# Patient Record
Sex: FEMALE | Race: BLACK OR AFRICAN AMERICAN | Employment: OTHER | URBAN - METROPOLITAN AREA
[De-identification: names, ages, dates, MRNs, and addresses within clinical notes are randomized per-mention and may not be internally consistent; named-entity substitution may affect disease eponyms.]

---

## 2019-02-26 LAB
HBA1C MFR BLD HPLC: 7.4 %
LDL-C, EXTERNAL: 203

## 2019-10-14 LAB — MAMMOGRAPHY, EXTERNAL: NORMAL

## 2019-11-26 ENCOUNTER — OP HISTORICAL/CONVERTED ENCOUNTER (OUTPATIENT)
Dept: OTHER | Age: 69
End: 2019-11-26

## 2020-07-17 VITALS
DIASTOLIC BLOOD PRESSURE: 81 MMHG | OXYGEN SATURATION: 97 % | HEIGHT: 62 IN | TEMPERATURE: 99 F | HEART RATE: 78 BPM | RESPIRATION RATE: 18 BRPM | BODY MASS INDEX: 46.14 KG/M2 | WEIGHT: 250.75 LBS | SYSTOLIC BLOOD PRESSURE: 155 MMHG

## 2020-07-17 PROBLEM — M19.90 CHRONIC OSTEOARTHRITIS: Status: ACTIVE | Noted: 2020-07-17

## 2020-07-17 PROBLEM — I10 ESSENTIAL (PRIMARY) HYPERTENSION: Status: ACTIVE | Noted: 2020-07-17

## 2020-07-17 PROBLEM — E78.2 MIXED HYPERLIPIDEMIA: Status: ACTIVE | Noted: 2020-07-17

## 2020-07-17 PROBLEM — R73.09 ELEVATED GLUCOSE: Status: ACTIVE | Noted: 2020-07-17

## 2020-07-17 RX ORDER — METFORMIN HYDROCHLORIDE 500 MG/1
TABLET ORAL
COMMUNITY
Start: 2020-05-16 | End: 2020-09-02

## 2020-07-17 RX ORDER — LOSARTAN POTASSIUM 100 MG/1
TABLET ORAL
COMMUNITY
Start: 2020-04-22 | End: 2020-10-26

## 2020-07-17 RX ORDER — OMEGA-3-ACID ETHYL ESTERS 1 G/1
2 CAPSULE, LIQUID FILLED ORAL 2 TIMES DAILY
COMMUNITY
End: 2020-07-30 | Stop reason: SDUPTHER

## 2020-07-17 RX ORDER — TRIAMTERENE/HYDROCHLOROTHIAZID 37.5-25 MG
TABLET ORAL
COMMUNITY
Start: 2020-04-22 | End: 2020-10-14

## 2020-07-17 RX ORDER — METOPROLOL SUCCINATE 50 MG/1
TABLET, EXTENDED RELEASE ORAL
COMMUNITY
Start: 2020-04-22 | End: 2020-10-14

## 2020-07-17 RX ORDER — ROSUVASTATIN CALCIUM 10 MG/1
TABLET, COATED ORAL
COMMUNITY
Start: 2020-04-22 | End: 2021-03-03

## 2020-07-30 ENCOUNTER — OFFICE VISIT (OUTPATIENT)
Dept: PRIMARY CARE CLINIC | Age: 70
End: 2020-07-30
Payer: MEDICARE

## 2020-07-30 VITALS
TEMPERATURE: 99.1 F | WEIGHT: 240.38 LBS | RESPIRATION RATE: 20 BRPM | SYSTOLIC BLOOD PRESSURE: 147 MMHG | OXYGEN SATURATION: 98 % | DIASTOLIC BLOOD PRESSURE: 89 MMHG | HEIGHT: 62 IN | HEART RATE: 72 BPM | BODY MASS INDEX: 44.23 KG/M2

## 2020-07-30 DIAGNOSIS — E78.2 MIXED HYPERLIPIDEMIA: ICD-10-CM

## 2020-07-30 DIAGNOSIS — M19.90 CHRONIC OSTEOARTHRITIS: ICD-10-CM

## 2020-07-30 DIAGNOSIS — R73.09 ELEVATED GLUCOSE: ICD-10-CM

## 2020-07-30 DIAGNOSIS — Z11.59 NEED FOR HEPATITIS C SCREENING TEST: ICD-10-CM

## 2020-07-30 DIAGNOSIS — E78.5 HYPERLIPIDEMIA, UNSPECIFIED HYPERLIPIDEMIA TYPE: Primary | ICD-10-CM

## 2020-07-30 DIAGNOSIS — I10 ESSENTIAL (PRIMARY) HYPERTENSION: ICD-10-CM

## 2020-07-30 PROCEDURE — 1101F PT FALLS ASSESS-DOCD LE1/YR: CPT | Performed by: FAMILY MEDICINE

## 2020-07-30 PROCEDURE — G8400 PT W/DXA NO RESULTS DOC: HCPCS | Performed by: FAMILY MEDICINE

## 2020-07-30 PROCEDURE — G8427 DOCREV CUR MEDS BY ELIG CLIN: HCPCS | Performed by: FAMILY MEDICINE

## 2020-07-30 PROCEDURE — 99214 OFFICE O/P EST MOD 30 MIN: CPT | Performed by: FAMILY MEDICINE

## 2020-07-30 PROCEDURE — G8536 NO DOC ELDER MAL SCRN: HCPCS | Performed by: FAMILY MEDICINE

## 2020-07-30 PROCEDURE — G8432 DEP SCR NOT DOC, RNG: HCPCS | Performed by: FAMILY MEDICINE

## 2020-07-30 PROCEDURE — G9899 SCRN MAM PERF RSLTS DOC: HCPCS | Performed by: FAMILY MEDICINE

## 2020-07-30 PROCEDURE — G8417 CALC BMI ABV UP PARAM F/U: HCPCS | Performed by: FAMILY MEDICINE

## 2020-07-30 RX ORDER — OMEGA-3-ACID ETHYL ESTERS 1 G/1
2 CAPSULE, LIQUID FILLED ORAL 2 TIMES DAILY
Qty: 360 CAP | Refills: 1 | Status: SHIPPED | OUTPATIENT
Start: 2020-07-30 | End: 2021-08-06

## 2020-07-30 NOTE — PROGRESS NOTES
Carline Kan is a 69 y.o. female who presents today for the following:  Chief Complaint   Patient presents with    Labs    Follow-up    Hypertension    Cholesterol Problem        This patient comes in today for follow up of the following medical conditions: Osteoarthritis, Diabetes Mellitus, Hypertension and Hyperlipidemia They also have some new problems including:     No Known Allergies    Current Outpatient Medications   Medication Sig    omega-3 acid ethyl esters (LOVAZA) 1 gram capsule Take 2 Caps by mouth two (2) times a day.  losartan (COZAAR) 100 mg tablet TAKE 1 TABLET BY MOUTH ONCE DAILY    metFORMIN (GLUCOPHAGE) 500 mg tablet TAKE 1 TABLET BY MOUTH TWICE DAILY WITH MEALS    metoprolol succinate (TOPROL-XL) 50 mg XL tablet TAKE 1 TABLET BY MOUTH ONCE DAILY    rosuvastatin (CRESTOR) 10 mg tablet TAKE 1 TABLET BY MOUTH ONCE DAILY    triamterene-hydroCHLOROthiazide (MAXZIDE) 37.5-25 mg per tablet TAKE 1 TABLET BY MOUTH ONCE DAILY     No current facility-administered medications for this visit. Past Medical History:   Diagnosis Date    Chronic osteoarthritis 7/17/2020    Elevated glucose 7/17/2020    Essential (primary) hypertension 7/17/2020    Mixed hyperlipidemia 7/17/2020       History reviewed. No pertinent surgical history.     Family History   Problem Relation Age of Onset    Heart Disease Mother     Heart Disease Brother        Social History     Socioeconomic History    Marital status: SINGLE     Spouse name: Not on file    Number of children: Not on file    Years of education: Not on file    Highest education level: Not on file   Occupational History    Not on file   Social Needs    Financial resource strain: Not on file    Food insecurity     Worry: Not on file     Inability: Not on file    Transportation needs     Medical: Not on file     Non-medical: Not on file   Tobacco Use    Smoking status: Current Every Day Smoker     Packs/day: 0.25    Smokeless tobacco: Never Used   Substance and Sexual Activity    Alcohol use: Yes     Comment: Occasionally    Drug use: Not on file    Sexual activity: Not on file   Lifestyle    Physical activity     Days per week: Not on file     Minutes per session: Not on file    Stress: Not on file   Relationships    Social connections     Talks on phone: Not on file     Gets together: Not on file     Attends Hindu service: Not on file     Active member of club or organization: Not on file     Attends meetings of clubs or organizations: Not on file     Relationship status: Not on file    Intimate partner violence     Fear of current or ex partner: Not on file     Emotionally abused: Not on file     Physically abused: Not on file     Forced sexual activity: Not on file   Other Topics Concern    Not on file   Social History Narrative    Not on file         Review of Systems   Constitutional: Negative. Respiratory: Negative. Cardiovascular: Negative. Gastrointestinal: Negative. Genitourinary: Negative. Musculoskeletal: Negative. Neurological: Negative. Psychiatric/Behavioral: Negative. All other systems reviewed and are negative. Visit Vitals  /89 (BP 1 Location: Left arm, BP Patient Position: Sitting)   Pulse 72   Temp 99.1 °F (37.3 °C) (Oral)   Resp 20   Ht 5' 2\" (1.575 m)   Wt 240 lb 6 oz (109 kg)   SpO2 98%   BMI 43.97 kg/m²     Physical Exam  Constitutional:       Appearance: Normal appearance. She is obese. Neck:      Musculoskeletal: Normal range of motion and neck supple. Cardiovascular:      Rate and Rhythm: Normal rate and regular rhythm. Pulses: Normal pulses. Heart sounds: Normal heart sounds. Pulmonary:      Effort: Pulmonary effort is normal.      Breath sounds: Normal breath sounds. Abdominal:      General: Bowel sounds are normal.      Palpations: Abdomen is soft. Musculoskeletal: Normal range of motion. Skin:     General: Skin is warm and dry.    Neurological: General: No focal deficit present. Mental Status: She is alert. Psychiatric:         Mood and Affect: Mood normal.              1. Hyperlipidemia, unspecified hyperlipidemia type  Discussed dietary changes and exercise she could do to help lower her numbers. We also discussed other medications besides the current ones she is on and we will see what her labs show. - omega-3 acid ethyl esters (LOVAZA) 1 gram capsule; Take 2 Caps by mouth two (2) times a day. Dispense: 360 Cap; Refill: 1    2. Essential (primary) hypertension  Her blood pressure is controlled  - CBC WITH AUTOMATED DIFF  - METABOLIC PANEL, COMPREHENSIVE  - URINALYSIS W/ RFLX MICROSCOPIC    3. Chronic osteoarthritis  She has chronic arthritis but no specific one joint that bothers her    4. Mixed hyperlipidemia  See above  - LIPID PANEL    5. Elevated glucose  We will check her sugar level to see if she is gone beyond prediabetes  - HEMOGLOBIN A1C WITH EAG    6.  Need for hepatitis C screening test  We will get the CDC recommended hepatitis C screen  - HEPATITIS C AB

## 2020-07-31 LAB
ALBUMIN SERPL-MCNC: 4.4 G/DL (ref 3.7–4.7)
ALBUMIN/GLOB SERPL: 1.2 {RATIO} (ref 1.2–2.2)
ALP SERPL-CCNC: 112 IU/L (ref 39–117)
ALT SERPL-CCNC: 23 IU/L (ref 0–32)
APPEARANCE UR: CLEAR
AST SERPL-CCNC: 16 IU/L (ref 0–40)
BASOPHILS # BLD AUTO: 0.1 X10E3/UL (ref 0–0.2)
BASOPHILS NFR BLD AUTO: 1 %
BILIRUB SERPL-MCNC: 0.3 MG/DL (ref 0–1.2)
BILIRUB UR QL STRIP: NEGATIVE
BUN SERPL-MCNC: 16 MG/DL (ref 8–27)
BUN/CREAT SERPL: 17 (ref 12–28)
CALCIUM SERPL-MCNC: 10.1 MG/DL (ref 8.7–10.3)
CHLORIDE SERPL-SCNC: 100 MMOL/L (ref 96–106)
CHOLEST SERPL-MCNC: 189 MG/DL (ref 100–199)
CO2 SERPL-SCNC: 25 MMOL/L (ref 20–29)
COLOR UR: YELLOW
CREAT SERPL-MCNC: 0.96 MG/DL (ref 0.57–1)
EOSINOPHIL # BLD AUTO: 0.2 X10E3/UL (ref 0–0.4)
EOSINOPHIL NFR BLD AUTO: 3 %
ERYTHROCYTE [DISTWIDTH] IN BLOOD BY AUTOMATED COUNT: 12.6 % (ref 11.7–15.4)
EST. AVERAGE GLUCOSE BLD GHB EST-MCNC: 194 MG/DL
GLOBULIN SER CALC-MCNC: 3.6 G/DL (ref 1.5–4.5)
GLUCOSE SERPL-MCNC: 183 MG/DL (ref 65–99)
GLUCOSE UR QL: NEGATIVE
HBA1C MFR BLD: 8.4 % (ref 4.8–5.6)
HCT VFR BLD AUTO: 42.7 % (ref 34–46.6)
HCV AB S/CO SERPL IA: <0.1 S/CO RATIO (ref 0–0.9)
HDLC SERPL-MCNC: 71 MG/DL
HGB BLD-MCNC: 14.6 G/DL (ref 11.1–15.9)
HGB UR QL STRIP: NEGATIVE
IMM GRANULOCYTES # BLD AUTO: 0 X10E3/UL (ref 0–0.1)
IMM GRANULOCYTES NFR BLD AUTO: 0 %
KETONES UR QL STRIP: NEGATIVE
LDLC SERPL CALC-MCNC: 99 MG/DL (ref 0–99)
LEUKOCYTE ESTERASE UR QL STRIP: NEGATIVE
LYMPHOCYTES # BLD AUTO: 2.4 X10E3/UL (ref 0.7–3.1)
LYMPHOCYTES NFR BLD AUTO: 40 %
MCH RBC QN AUTO: 32.8 PG (ref 26.6–33)
MCHC RBC AUTO-ENTMCNC: 34.2 G/DL (ref 31.5–35.7)
MCV RBC AUTO: 96 FL (ref 79–97)
MICRO URNS: NORMAL
MONOCYTES # BLD AUTO: 0.5 X10E3/UL (ref 0.1–0.9)
MONOCYTES NFR BLD AUTO: 8 %
NEUTROPHILS # BLD AUTO: 2.9 X10E3/UL (ref 1.4–7)
NEUTROPHILS NFR BLD AUTO: 48 %
NITRITE UR QL STRIP: NEGATIVE
PH UR STRIP: 7 [PH] (ref 5–7.5)
PLATELET # BLD AUTO: 180 X10E3/UL (ref 150–450)
POTASSIUM SERPL-SCNC: 4.5 MMOL/L (ref 3.5–5.2)
PROT SERPL-MCNC: 8 G/DL (ref 6–8.5)
PROT UR QL STRIP: NEGATIVE
RBC # BLD AUTO: 4.45 X10E6/UL (ref 3.77–5.28)
SODIUM SERPL-SCNC: 140 MMOL/L (ref 134–144)
SP GR UR: 1.01 (ref 1–1.03)
TRIGL SERPL-MCNC: 93 MG/DL (ref 0–149)
UROBILINOGEN UR STRIP-MCNC: 0.2 MG/DL (ref 0.2–1)
VLDLC SERPL CALC-MCNC: 19 MG/DL (ref 5–40)
WBC # BLD AUTO: 6 X10E3/UL (ref 3.4–10.8)

## 2020-08-07 ENCOUNTER — TELEPHONE (OUTPATIENT)
Dept: PRIMARY CARE CLINIC | Age: 70
End: 2020-08-07

## 2020-08-07 NOTE — PROGRESS NOTES
Inform the patient that her sugar was elevated. Her hemoglobin A1c was 8.4. Recommend she increase the metformin to 1 tablet in the morning and 2 tablets at night. We will adjust the medication when she needs a new refill.   Recommend fasting office visit in 6 months

## 2020-08-11 ENCOUNTER — TELEPHONE (OUTPATIENT)
Dept: PRIMARY CARE CLINIC | Age: 70
End: 2020-08-11

## 2020-08-14 ENCOUNTER — TELEPHONE (OUTPATIENT)
Dept: PRIMARY CARE CLINIC | Age: 70
End: 2020-08-14

## 2020-08-14 NOTE — TELEPHONE ENCOUNTER
----- Message from Piedad Lambert MD sent at 8/9/2020  8:53 PM EDT -----  Please call the patient regarding her abnormal result.   See prior note

## 2020-09-02 RX ORDER — METFORMIN HYDROCHLORIDE 500 MG/1
TABLET ORAL
Qty: 180 TAB | Refills: 0 | Status: SHIPPED | OUTPATIENT
Start: 2020-09-02 | End: 2021-01-22

## 2020-10-14 RX ORDER — METOPROLOL SUCCINATE 50 MG/1
TABLET, EXTENDED RELEASE ORAL
Qty: 90 TAB | Refills: 0 | Status: SHIPPED | OUTPATIENT
Start: 2020-10-14 | End: 2021-01-22

## 2020-10-14 RX ORDER — TRIAMTERENE/HYDROCHLOROTHIAZID 37.5-25 MG
TABLET ORAL
Qty: 90 TAB | Refills: 0 | Status: SHIPPED | OUTPATIENT
Start: 2020-10-14 | End: 2021-01-22

## 2020-10-21 ENCOUNTER — TELEPHONE (OUTPATIENT)
Dept: PRIMARY CARE CLINIC | Age: 70
End: 2020-10-21

## 2020-10-21 DIAGNOSIS — Z12.31 ENCOUNTER FOR SCREENING MAMMOGRAM FOR MALIGNANT NEOPLASM OF BREAST: Primary | ICD-10-CM

## 2020-10-26 RX ORDER — LOSARTAN POTASSIUM 100 MG/1
TABLET ORAL
Qty: 90 TAB | Refills: 0 | Status: SHIPPED | OUTPATIENT
Start: 2020-10-26 | End: 2021-02-18

## 2020-10-30 ENCOUNTER — HOSPITAL ENCOUNTER (OUTPATIENT)
Dept: MAMMOGRAPHY | Age: 70
Discharge: HOME | End: 2020-10-30
Attending: FAMILY MEDICINE
Payer: MEDICARE

## 2020-10-30 DIAGNOSIS — Z12.31 ENCOUNTER FOR SCREENING MAMMOGRAM FOR MALIGNANT NEOPLASM OF BREAST: ICD-10-CM

## 2020-10-30 PROCEDURE — 77067 SCR MAMMO BI INCL CAD: CPT

## 2020-11-02 ENCOUNTER — HOSPITAL ENCOUNTER (EMERGENCY)
Age: 70
Discharge: HOME | End: 2020-11-02
Attending: EMERGENCY MEDICINE
Payer: MEDICARE

## 2020-11-02 VITALS
BODY MASS INDEX: 41.41 KG/M2 | HEIGHT: 62 IN | HEART RATE: 76 BPM | RESPIRATION RATE: 16 BRPM | WEIGHT: 225 LBS | OXYGEN SATURATION: 97 % | TEMPERATURE: 98.4 F | SYSTOLIC BLOOD PRESSURE: 152 MMHG | DIASTOLIC BLOOD PRESSURE: 103 MMHG
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
## 2020-11-02 PROCEDURE — 74011636637 HC RX REV CODE- 636/637: Performed by: EMERGENCY MEDICINE

## 2020-11-02 PROCEDURE — 74011250637 HC RX REV CODE- 250/637: Performed by: EMERGENCY MEDICINE

## 2020-11-02 PROCEDURE — 99284 EMERGENCY DEPT VISIT MOD MDM: CPT

## 2020-11-02 RX ORDER — FAMOTIDINE 20 MG/1
20 TABLET, FILM COATED ORAL
Status: COMPLETED | OUTPATIENT
Start: 2020-11-02 | End: 2020-11-02

## 2020-11-02 RX ORDER — CETIRIZINE HYDROCHLORIDE 10 MG/1
10 CAPSULE, LIQUID FILLED ORAL DAILY
Qty: 7 CAP | Refills: 0 | Status: SHIPPED | OUTPATIENT
Start: 2020-11-02 | End: 2021-05-18 | Stop reason: SDUPTHER

## 2020-11-02 RX ORDER — PREDNISONE 20 MG/1
20 TABLET ORAL
Status: COMPLETED | OUTPATIENT
Start: 2020-11-02 | End: 2020-11-02

## 2020-11-02 RX ORDER — LORATADINE 10 MG/1
10 TABLET ORAL
Status: DISCONTINUED | OUTPATIENT
Start: 2020-11-02 | End: 2020-11-02

## 2020-11-02 RX ORDER — PERMETHRIN 50 MG/G
CREAM TOPICAL
Qty: 60 G | Refills: 0 | Status: SHIPPED | OUTPATIENT
Start: 2020-11-02 | End: 2020-12-02

## 2020-11-02 RX ADMIN — FAMOTIDINE 20 MG: 20 TABLET, FILM COATED ORAL at 01:49

## 2020-11-02 RX ADMIN — PREDNISONE 20 MG: 20 TABLET ORAL at 01:49

## 2020-11-02 NOTE — ED NOTES
Patient given and verbalized understanding of all discharge and follow-up instructions. Patient verbalized understanding of medication administration. Patient departed ED ambulatory by self.

## 2020-11-02 NOTE — ED PROVIDER NOTES
EMERGENCY DEPARTMENT HISTORY AND PHYSICAL EXAM      Date: 11/2/2020  Patient Name: Lindsey Lynn    History of Presenting Illness     Chief Complaint   Patient presents with    Skin Problem       History Provided By: Patient    HPI: Lindsey Lynn, 69 y.o. female with a past medical history significant obesity , HTN presents to the ED with cc of all over itching all over for 2 weeks. She denies any recent change in detergent lotion. Patient denies shortness of breath nausea vomiting swelling of the lips or tongue swelling. No previous history of itching    There are no other complaints, changes, or physical findings at this time. PCP: Ruslan Gilbert MD    No current facility-administered medications on file prior to encounter. Current Outpatient Medications on File Prior to Encounter   Medication Sig Dispense Refill    losartan (COZAAR) 100 mg tablet Take 1 tablet by mouth once daily 90 Tab 0    triamterene-hydroCHLOROthiazide (MAXZIDE) 37.5-25 mg per tablet Take 1 tablet by mouth once daily 90 Tab 0    metoprolol succinate (TOPROL-XL) 50 mg XL tablet Take 1 tablet by mouth once daily 90 Tab 0    metFORMIN (GLUCOPHAGE) 500 mg tablet TAKE 1 TABLET BY MOUTH TWICE DAILY WITH MEALS 180 Tab 0    omega-3 acid ethyl esters (LOVAZA) 1 gram capsule Take 2 Caps by mouth two (2) times a day.  360 Cap 1    rosuvastatin (CRESTOR) 10 mg tablet TAKE 1 TABLET BY MOUTH ONCE DAILY         Past History     Past Medical History:  Past Medical History:   Diagnosis Date    Chronic osteoarthritis 7/17/2020    Elevated glucose 7/17/2020    Essential (primary) hypertension 7/17/2020    Menopause     Mixed hyperlipidemia 7/17/2020       Past Surgical History:  Past Surgical History:   Procedure Laterality Date    HX BREAST BIOPSY Left 11/2019       Family History:  Family History   Problem Relation Age of Onset    Heart Disease Mother     Heart Disease Brother        Social History:  Social History     Tobacco Use    Smoking status: Current Every Day Smoker     Packs/day: 0.25    Smokeless tobacco: Never Used   Substance Use Topics    Alcohol use: Yes     Comment: Occasionally    Drug use: Not on file       Allergies:  No Known Allergies      Review of Systems     Review of Systems   Constitutional: Negative. Eyes: Negative. Respiratory: Negative. Cardiovascular: Negative. Gastrointestinal: Negative. Musculoskeletal: Negative. Skin: Positive for rash. Excoriated area over the their entire body   Neurological: Negative. Psychiatric/Behavioral: Negative. All other systems reviewed and are negative. Physical Exam   Physical Exam  Vitals signs and nursing note reviewed. Constitutional:       Appearance: Normal appearance. She is obese. HENT:      Head: Normocephalic. Nose: Nose normal.   Neck:      Musculoskeletal: Normal range of motion and neck supple. Cardiovascular:      Rate and Rhythm: Normal rate and regular rhythm. Pulses: Normal pulses. Pulmonary:      Effort: Pulmonary effort is normal.      Breath sounds: Normal breath sounds. Abdominal:      General: Bowel sounds are normal.      Palpations: Abdomen is soft. Skin:     General: Skin is warm. Capillary Refill: Capillary refill takes less than 2 seconds. Findings: Lesion and rash present. Comments:  excoriated areas over the entire body   Neurological:      Mental Status: She is alert. Psychiatric:         Mood and Affect: Mood normal.         Lab and Diagnostic Study Results     Labs -   No results found for this or any previous visit (from the past 12 hour(s)). Radiologic Studies -   @lastxrresult@  CT Results  (Last 48 hours)    None        CXR Results  (Last 48 hours)    None            Medical Decision Making   - I am the first provider for this patient.     - I reviewed the vital signs, available nursing notes, past medical history, past surgical history, family history and social history. - Initial assessment performed. The patients presenting problems have been discussed, and they are in agreement with the care plan formulated and outlined with them. I have encouraged them to ask questions as they arise throughout their visit. Vital Signs-Reviewed the patient's vital signs. Patient Vitals for the past 12 hrs:   Pulse Resp BP SpO2   11/02/20 0120    97 %   11/02/20 0119 75 16 (!) 153/119 97 %       Records Reviewed: Nursing Notes and Old Medical Records    The patient presents with Differential  Diagnosis: scabies bedbugs and contact dermatitis    ED Course:          Provider Notes (Medical Decision Making):   MDM  Number of Diagnoses or Management Options  Insect bites and stings, initial encounter:   Diagnosis management comments: 1. Scabies  2 bed bugs  3.contact dermatitis  4. Insect bite    Risk of Complications, Morbidity, and/or Mortality  Presenting problems: low  Management options: low    Patient Progress  Patient progress: stable         Procedures     Medical Decision Makingedical Decision Making  PROCEDURES:Procedures       Disposition   Disposition: Home Analgesics        DISCHARGE PLAN:  1. Aveeno bath Aveeno lotion affected area  Current Discharge Medication List      CONTINUE these medications which have NOT CHANGED    Details   losartan (COZAAR) 100 mg tablet Take 1 tablet by mouth once daily  Qty: 90 Tab, Refills: 0      triamterene-hydroCHLOROthiazide (MAXZIDE) 37.5-25 mg per tablet Take 1 tablet by mouth once daily  Qty: 90 Tab, Refills: 0      metoprolol succinate (TOPROL-XL) 50 mg XL tablet Take 1 tablet by mouth once daily  Qty: 90 Tab, Refills: 0      metFORMIN (GLUCOPHAGE) 500 mg tablet TAKE 1 TABLET BY MOUTH TWICE DAILY WITH MEALS  Qty: 180 Tab, Refills: 0      omega-3 acid ethyl esters (LOVAZA) 1 gram capsule Take 2 Caps by mouth two (2) times a day.   Qty: 360 Cap, Refills: 1    Associated Diagnoses: Hyperlipidemia, unspecified hyperlipidemia type rosuvastatin (CRESTOR) 10 mg tablet TAKE 1 TABLET BY MOUTH ONCE DAILY           2. Follow-up Information    None       3. Return to ED if worse   4. Current Discharge Medication List            Diagnosis     Clinical Impression: No diagnosis found. ICD-10-CM ICD-9-CM    1. Insect bites and stings, initial encounter  W57. Chauncy Claude 919.4      989.5      E906.4      E905.5        Peewee Stuart MD    Please note that this dictation was completed with eigital, the computer voice recognition software. Quite often unanticipated grammatical, syntax, homophones, and other interpretive errors are inadvertently transcribed by the computer software. Please disregard these errors. Please excuse any errors that have escaped final proofreading. Thank you.

## 2020-11-02 NOTE — DISCHARGE INSTRUCTIONS
Patient advised to apply Aveeno lotion and benadryl for itching. Driving or operation of machinery while taking Benadryl.

## 2020-11-02 NOTE — ED TRIAGE NOTES
Pt reports that she began noticing little red marks on her legs and arms and across her back that have been causing her to \"itch everywhere\" about 2 weeks ago. Pt denies any SOB or any other issues.

## 2020-11-09 ENCOUNTER — HOSPITAL ENCOUNTER (INPATIENT)
Age: 70
LOS: 3 days | Discharge: HOME HEALTH | DRG: 885 | End: 2020-11-13
Attending: EMERGENCY MEDICINE | Admitting: STUDENT IN AN ORGANIZED HEALTH CARE EDUCATION/TRAINING PROGRAM
Payer: MEDICARE

## 2020-11-09 DIAGNOSIS — N39.0 URINARY TRACT INFECTION WITHOUT HEMATURIA, SITE UNSPECIFIED: Primary | ICD-10-CM

## 2020-11-09 DIAGNOSIS — I67.89 CEREBRAL MICROVASCULAR DISEASE: ICD-10-CM

## 2020-11-09 DIAGNOSIS — R41.82 ALTERED MENTAL STATUS, UNSPECIFIED ALTERED MENTAL STATUS TYPE: ICD-10-CM

## 2020-11-09 DIAGNOSIS — R44.1 HALLUCINATIONS, VISUAL: ICD-10-CM

## 2020-11-09 DIAGNOSIS — I65.23 BILATERAL CAROTID ARTERY STENOSIS: ICD-10-CM

## 2020-11-09 LAB
COMMENT, HOLDF: NORMAL
GLUCOSE BLD STRIP.AUTO-MCNC: 147 MG/DL (ref 65–100)
SAMPLES BEING HELD,HOLD: NORMAL
SERVICE CMNT-IMP: ABNORMAL

## 2020-11-09 PROCEDURE — 85025 COMPLETE CBC W/AUTO DIFF WBC: CPT

## 2020-11-09 PROCEDURE — 99285 EMERGENCY DEPT VISIT HI MDM: CPT

## 2020-11-09 PROCEDURE — 84484 ASSAY OF TROPONIN QUANT: CPT

## 2020-11-09 PROCEDURE — 80053 COMPREHEN METABOLIC PANEL: CPT

## 2020-11-09 PROCEDURE — 93005 ELECTROCARDIOGRAM TRACING: CPT

## 2020-11-09 PROCEDURE — 36415 COLL VENOUS BLD VENIPUNCTURE: CPT

## 2020-11-09 PROCEDURE — 82962 GLUCOSE BLOOD TEST: CPT

## 2020-11-09 PROCEDURE — 96365 THER/PROPH/DIAG IV INF INIT: CPT

## 2020-11-09 PROCEDURE — 82550 ASSAY OF CK (CPK): CPT

## 2020-11-09 RX ORDER — SODIUM CHLORIDE 0.9 % (FLUSH) 0.9 %
5-40 SYRINGE (ML) INJECTION AS NEEDED
Status: DISCONTINUED | OUTPATIENT
Start: 2020-11-09 | End: 2020-11-13 | Stop reason: HOSPADM

## 2020-11-09 RX ORDER — SODIUM CHLORIDE 0.9 % (FLUSH) 0.9 %
5-40 SYRINGE (ML) INJECTION EVERY 8 HOURS
Status: DISCONTINUED | OUTPATIENT
Start: 2020-11-09 | End: 2020-11-13 | Stop reason: HOSPADM

## 2020-11-10 ENCOUNTER — APPOINTMENT (OUTPATIENT)
Dept: GENERAL RADIOLOGY | Age: 70
DRG: 885 | End: 2020-11-10
Attending: EMERGENCY MEDICINE
Payer: MEDICARE

## 2020-11-10 ENCOUNTER — APPOINTMENT (OUTPATIENT)
Dept: VASCULAR SURGERY | Age: 70
DRG: 885 | End: 2020-11-10
Attending: PSYCHIATRY & NEUROLOGY
Payer: MEDICARE

## 2020-11-10 ENCOUNTER — APPOINTMENT (OUTPATIENT)
Dept: MRI IMAGING | Age: 70
DRG: 885 | End: 2020-11-10
Attending: PSYCHIATRY & NEUROLOGY
Payer: MEDICARE

## 2020-11-10 ENCOUNTER — APPOINTMENT (OUTPATIENT)
Dept: CT IMAGING | Age: 70
DRG: 885 | End: 2020-11-10
Attending: EMERGENCY MEDICINE
Payer: MEDICARE

## 2020-11-10 ENCOUNTER — APPOINTMENT (OUTPATIENT)
Dept: MRI IMAGING | Age: 70
DRG: 885 | End: 2020-11-10
Attending: STUDENT IN AN ORGANIZED HEALTH CARE EDUCATION/TRAINING PROGRAM
Payer: MEDICARE

## 2020-11-10 PROBLEM — I65.23 BILATERAL CAROTID ARTERY STENOSIS: Status: ACTIVE | Noted: 2020-11-10

## 2020-11-10 PROBLEM — G40.209 COMPLEX PARTIAL SEIZURES WITH IMPAIRED CONSCIOUSNESS AT ONSET (HCC): Status: ACTIVE | Noted: 2020-11-10

## 2020-11-10 PROBLEM — I67.89 CEREBRAL MICROVASCULAR DISEASE: Status: ACTIVE | Noted: 2020-11-10

## 2020-11-10 PROBLEM — F29 PSYCHOSIS (HCC): Status: ACTIVE | Noted: 2020-11-10

## 2020-11-10 PROBLEM — R41.82 ACUTE ALTERATION IN MENTAL STATUS: Status: ACTIVE | Noted: 2020-11-10

## 2020-11-10 PROBLEM — R44.3 HALLUCINATIONS: Status: ACTIVE | Noted: 2020-11-10

## 2020-11-10 PROBLEM — R41.0 ACUTE CONFUSION: Status: ACTIVE | Noted: 2020-11-10

## 2020-11-10 LAB
ALBUMIN SERPL-MCNC: 3.6 G/DL (ref 3.5–5)
ALBUMIN/GLOB SERPL: 0.9 {RATIO} (ref 1.1–2.2)
ALP SERPL-CCNC: 78 U/L (ref 45–117)
ALT SERPL-CCNC: 34 U/L (ref 12–78)
AMPHET UR QL SCN: NEGATIVE
ANION GAP SERPL CALC-SCNC: 6 MMOL/L (ref 5–15)
AST SERPL-CCNC: 23 U/L (ref 15–37)
BARBITURATES UR QL SCN: NEGATIVE
BASOPHILS # BLD: 0 K/UL (ref 0–0.1)
BASOPHILS NFR BLD: 1 % (ref 0–1)
BENZODIAZ UR QL: NEGATIVE
BILIRUB SERPL-MCNC: 0.5 MG/DL (ref 0.2–1)
BUN SERPL-MCNC: 25 MG/DL (ref 6–20)
BUN/CREAT SERPL: 20 (ref 12–20)
CALCIUM SERPL-MCNC: 9.8 MG/DL (ref 8.5–10.1)
CANNABINOIDS UR QL SCN: NEGATIVE
CHLORIDE SERPL-SCNC: 106 MMOL/L (ref 97–108)
CK MB CFR SERPL CALC: 1.3 % (ref 0–2.5)
CK MB SERPL-MCNC: 3.8 NG/ML (ref 5–25)
CK SERPL-CCNC: 286 U/L (ref 26–192)
CO2 SERPL-SCNC: 27 MMOL/L (ref 21–32)
COCAINE UR QL SCN: NEGATIVE
CREAT SERPL-MCNC: 1.27 MG/DL (ref 0.55–1.02)
DIFFERENTIAL METHOD BLD: ABNORMAL
DRUG SCRN COMMENT,DRGCM: NORMAL
EOSINOPHIL # BLD: 0.1 K/UL (ref 0–0.4)
EOSINOPHIL NFR BLD: 1 % (ref 0–7)
ERYTHROCYTE [DISTWIDTH] IN BLOOD BY AUTOMATED COUNT: 12.9 % (ref 11.5–14.5)
EST. AVERAGE GLUCOSE BLD GHB EST-MCNC: 166 MG/DL
ETHANOL SERPL-MCNC: <10 MG/DL
GLOBULIN SER CALC-MCNC: 4 G/DL (ref 2–4)
GLUCOSE BLD STRIP.AUTO-MCNC: 169 MG/DL (ref 65–100)
GLUCOSE BLD STRIP.AUTO-MCNC: 243 MG/DL (ref 65–100)
GLUCOSE BLD STRIP.AUTO-MCNC: 258 MG/DL (ref 65–100)
GLUCOSE SERPL-MCNC: 164 MG/DL (ref 65–100)
HBA1C MFR BLD: 7.4 % (ref 4–5.6)
HCT VFR BLD AUTO: 39.1 % (ref 35–47)
HGB BLD-MCNC: 12.9 G/DL (ref 11.5–16)
IMM GRANULOCYTES # BLD AUTO: 0 K/UL (ref 0–0.04)
IMM GRANULOCYTES NFR BLD AUTO: 0 % (ref 0–0.5)
LEFT CCA DIST DIAS: 29.4 CM/S
LEFT CCA DIST SYS: 96.4 CM/S
LEFT CCA PROX DIAS: 23.8 CM/S
LEFT CCA PROX SYS: 108.7 CM/S
LEFT ECA DIAS: 9.89 CM/S
LEFT ECA SYS: 80.3 CM/S
LEFT ICA DIST DIAS: 28.4 CM/S
LEFT ICA DIST SYS: 97.3 CM/S
LEFT ICA MID DIAS: 27.3 CM/S
LEFT ICA MID SYS: 89.5 CM/S
LEFT ICA PROX DIAS: 17.5 CM/S
LEFT ICA PROX SYS: 74.7 CM/S
LEFT ICA/CCA SYS: 1.01
LEFT SUBCLAVIAN DIAS: 0 CM/S
LEFT SUBCLAVIAN SYS: 131.5 CM/S
LEFT VERTEBRAL DIAS: 11.06 CM/S
LEFT VERTEBRAL SYS: 42.8 CM/S
LYMPHOCYTES # BLD: 2.2 K/UL (ref 0.8–3.5)
LYMPHOCYTES NFR BLD: 36 % (ref 12–49)
MCH RBC QN AUTO: 32.4 PG (ref 26–34)
MCHC RBC AUTO-ENTMCNC: 33 G/DL (ref 30–36.5)
MCV RBC AUTO: 98.2 FL (ref 80–99)
METHADONE UR QL: NEGATIVE
MONOCYTES # BLD: 0.7 K/UL (ref 0–1)
MONOCYTES NFR BLD: 11 % (ref 5–13)
NEUTS SEG # BLD: 3.2 K/UL (ref 1.8–8)
NEUTS SEG NFR BLD: 51 % (ref 32–75)
NRBC # BLD: 0 K/UL (ref 0–0.01)
NRBC BLD-RTO: 0 PER 100 WBC
OPIATES UR QL: NEGATIVE
PCP UR QL: NEGATIVE
PLATELET # BLD AUTO: 141 K/UL (ref 150–400)
PMV BLD AUTO: 11.6 FL (ref 8.9–12.9)
POTASSIUM SERPL-SCNC: 3.9 MMOL/L (ref 3.5–5.1)
PROT SERPL-MCNC: 7.6 G/DL (ref 6.4–8.2)
RBC # BLD AUTO: 3.98 M/UL (ref 3.8–5.2)
RIGHT CCA DIST DIAS: 24.4 CM/S
RIGHT CCA DIST SYS: 88.8 CM/S
RIGHT CCA PROX DIAS: 15.2 CM/S
RIGHT CCA PROX SYS: 85.7 CM/S
RIGHT ECA DIAS: 7.67 CM/S
RIGHT ECA SYS: 71.9 CM/S
RIGHT ICA DIST DIAS: 18.3 CM/S
RIGHT ICA DIST SYS: 62.3 CM/S
RIGHT ICA MID DIAS: 19.5 CM/S
RIGHT ICA MID SYS: 64.9 CM/S
RIGHT ICA PROX DIAS: 7.5 CM/S
RIGHT ICA PROX SYS: 50.5 CM/S
RIGHT ICA/CCA SYS: 0.7
RIGHT SUBCLAVIAN DIAS: 0 CM/S
RIGHT SUBCLAVIAN SYS: 58.1 CM/S
RIGHT VERTEBRAL DIAS: 16.41 CM/S
RIGHT VERTEBRAL SYS: 56.1 CM/S
SERVICE CMNT-IMP: ABNORMAL
SODIUM SERPL-SCNC: 139 MMOL/L (ref 136–145)
TROPONIN I SERPL-MCNC: <0.05 NG/ML
WBC # BLD AUTO: 6.3 K/UL (ref 3.6–11)

## 2020-11-10 PROCEDURE — 73110 X-RAY EXAM OF WRIST: CPT

## 2020-11-10 PROCEDURE — 82962 GLUCOSE BLOOD TEST: CPT

## 2020-11-10 PROCEDURE — 65270000015 HC RM PRIVATE ONCOLOGY

## 2020-11-10 PROCEDURE — 74011250637 HC RX REV CODE- 250/637: Performed by: STUDENT IN AN ORGANIZED HEALTH CARE EDUCATION/TRAINING PROGRAM

## 2020-11-10 PROCEDURE — 74011250636 HC RX REV CODE- 250/636: Performed by: STUDENT IN AN ORGANIZED HEALTH CARE EDUCATION/TRAINING PROGRAM

## 2020-11-10 PROCEDURE — 93880 EXTRACRANIAL BILAT STUDY: CPT

## 2020-11-10 PROCEDURE — 80307 DRUG TEST PRSMV CHEM ANLYZR: CPT

## 2020-11-10 PROCEDURE — 83036 HEMOGLOBIN GLYCOSYLATED A1C: CPT

## 2020-11-10 PROCEDURE — 77030038269 HC DRN EXT URIN PURWCK BARD -A

## 2020-11-10 PROCEDURE — 74011250636 HC RX REV CODE- 250/636: Performed by: EMERGENCY MEDICINE

## 2020-11-10 PROCEDURE — 74011250636 HC RX REV CODE- 250/636: Performed by: PSYCHIATRY & NEUROLOGY

## 2020-11-10 PROCEDURE — 71045 X-RAY EXAM CHEST 1 VIEW: CPT

## 2020-11-10 PROCEDURE — 86780 TREPONEMA PALLIDUM: CPT

## 2020-11-10 PROCEDURE — 70544 MR ANGIOGRAPHY HEAD W/O DYE: CPT

## 2020-11-10 PROCEDURE — 72141 MRI NECK SPINE W/O DYE: CPT

## 2020-11-10 PROCEDURE — 96365 THER/PROPH/DIAG IV INF INIT: CPT

## 2020-11-10 PROCEDURE — 74011636637 HC RX REV CODE- 636/637: Performed by: STUDENT IN AN ORGANIZED HEALTH CARE EDUCATION/TRAINING PROGRAM

## 2020-11-10 PROCEDURE — 74011000258 HC RX REV CODE- 258: Performed by: EMERGENCY MEDICINE

## 2020-11-10 PROCEDURE — 95816 EEG AWAKE AND DROWSY: CPT | Performed by: PSYCHIATRY & NEUROLOGY

## 2020-11-10 PROCEDURE — 70551 MRI BRAIN STEM W/O DYE: CPT

## 2020-11-10 PROCEDURE — 99223 1ST HOSP IP/OBS HIGH 75: CPT | Performed by: PSYCHIATRY & NEUROLOGY

## 2020-11-10 PROCEDURE — 81001 URINALYSIS AUTO W/SCOPE: CPT

## 2020-11-10 PROCEDURE — 74011250637 HC RX REV CODE- 250/637: Performed by: HOSPITALIST

## 2020-11-10 PROCEDURE — 73502 X-RAY EXAM HIP UNI 2-3 VIEWS: CPT

## 2020-11-10 PROCEDURE — 93880 EXTRACRANIAL BILAT STUDY: CPT | Performed by: PSYCHIATRY & NEUROLOGY

## 2020-11-10 PROCEDURE — 36415 COLL VENOUS BLD VENIPUNCTURE: CPT

## 2020-11-10 PROCEDURE — 72125 CT NECK SPINE W/O DYE: CPT

## 2020-11-10 PROCEDURE — 77030005110 HC CATH GASTMY BTN BARD -B

## 2020-11-10 PROCEDURE — 70450 CT HEAD/BRAIN W/O DYE: CPT

## 2020-11-10 RX ORDER — INSULIN LISPRO 100 [IU]/ML
INJECTION, SOLUTION INTRAVENOUS; SUBCUTANEOUS
Status: DISCONTINUED | OUTPATIENT
Start: 2020-11-10 | End: 2020-11-13 | Stop reason: HOSPADM

## 2020-11-10 RX ORDER — LABETALOL HYDROCHLORIDE 5 MG/ML
10 INJECTION, SOLUTION INTRAVENOUS
Status: DISCONTINUED | OUTPATIENT
Start: 2020-11-10 | End: 2020-11-13 | Stop reason: HOSPADM

## 2020-11-10 RX ORDER — PERMETHRIN 50 MG/G
CREAM TOPICAL DAILY
Status: DISCONTINUED | OUTPATIENT
Start: 2020-11-11 | End: 2020-11-10

## 2020-11-10 RX ORDER — SODIUM CHLORIDE 0.9 % (FLUSH) 0.9 %
5-40 SYRINGE (ML) INJECTION EVERY 8 HOURS
Status: DISCONTINUED | OUTPATIENT
Start: 2020-11-10 | End: 2020-11-13 | Stop reason: HOSPADM

## 2020-11-10 RX ORDER — ENOXAPARIN SODIUM 100 MG/ML
40 INJECTION SUBCUTANEOUS DAILY
Status: DISCONTINUED | OUTPATIENT
Start: 2020-11-10 | End: 2020-11-10

## 2020-11-10 RX ORDER — PERMETHRIN 50 MG/G
CREAM TOPICAL DAILY
Status: DISCONTINUED | OUTPATIENT
Start: 2020-11-10 | End: 2020-11-10

## 2020-11-10 RX ORDER — MAGNESIUM SULFATE 100 %
4 CRYSTALS MISCELLANEOUS AS NEEDED
Status: DISCONTINUED | OUTPATIENT
Start: 2020-11-10 | End: 2020-11-13 | Stop reason: HOSPADM

## 2020-11-10 RX ORDER — ACETAMINOPHEN 650 MG/1
650 SUPPOSITORY RECTAL
Status: DISCONTINUED | OUTPATIENT
Start: 2020-11-10 | End: 2020-11-13 | Stop reason: HOSPADM

## 2020-11-10 RX ORDER — SODIUM CHLORIDE 0.9 % (FLUSH) 0.9 %
5-40 SYRINGE (ML) INJECTION AS NEEDED
Status: DISCONTINUED | OUTPATIENT
Start: 2020-11-10 | End: 2020-11-13 | Stop reason: HOSPADM

## 2020-11-10 RX ORDER — POLYETHYLENE GLYCOL 3350 17 G/17G
17 POWDER, FOR SOLUTION ORAL DAILY PRN
Status: DISCONTINUED | OUTPATIENT
Start: 2020-11-10 | End: 2020-11-13 | Stop reason: HOSPADM

## 2020-11-10 RX ORDER — TRAMADOL HYDROCHLORIDE 50 MG/1
50 TABLET ORAL
Status: DISCONTINUED | OUTPATIENT
Start: 2020-11-10 | End: 2020-11-13 | Stop reason: HOSPADM

## 2020-11-10 RX ORDER — TRIAMTERENE/HYDROCHLOROTHIAZID 37.5-25 MG
1 TABLET ORAL DAILY
Status: DISCONTINUED | OUTPATIENT
Start: 2020-11-10 | End: 2020-11-13 | Stop reason: HOSPADM

## 2020-11-10 RX ORDER — ROSUVASTATIN CALCIUM 10 MG/1
10 TABLET, COATED ORAL DAILY
Status: DISCONTINUED | OUTPATIENT
Start: 2020-11-10 | End: 2020-11-13 | Stop reason: HOSPADM

## 2020-11-10 RX ORDER — ACETAMINOPHEN 325 MG/1
650 TABLET ORAL
Status: DISCONTINUED | OUTPATIENT
Start: 2020-11-10 | End: 2020-11-13 | Stop reason: HOSPADM

## 2020-11-10 RX ORDER — LOSARTAN POTASSIUM 100 MG/1
100 TABLET ORAL DAILY
Status: DISCONTINUED | OUTPATIENT
Start: 2020-11-10 | End: 2020-11-13 | Stop reason: HOSPADM

## 2020-11-10 RX ORDER — DEXTROSE 50 % IN WATER (D50W) INTRAVENOUS SYRINGE
25-50 AS NEEDED
Status: DISCONTINUED | OUTPATIENT
Start: 2020-11-10 | End: 2020-11-13 | Stop reason: HOSPADM

## 2020-11-10 RX ORDER — ENOXAPARIN SODIUM 100 MG/ML
40 INJECTION SUBCUTANEOUS EVERY 12 HOURS
Status: DISCONTINUED | OUTPATIENT
Start: 2020-11-10 | End: 2020-11-11 | Stop reason: DRUGHIGH

## 2020-11-10 RX ORDER — INSULIN GLARGINE 100 [IU]/ML
0.2 INJECTION, SOLUTION SUBCUTANEOUS DAILY
Status: DISCONTINUED | OUTPATIENT
Start: 2020-11-10 | End: 2020-11-13 | Stop reason: HOSPADM

## 2020-11-10 RX ORDER — METOPROLOL SUCCINATE 50 MG/1
50 TABLET, EXTENDED RELEASE ORAL DAILY
Status: DISCONTINUED | OUTPATIENT
Start: 2020-11-10 | End: 2020-11-13 | Stop reason: HOSPADM

## 2020-11-10 RX ORDER — LORAZEPAM 2 MG/ML
1-2 INJECTION INTRAMUSCULAR
Status: DISCONTINUED | OUTPATIENT
Start: 2020-11-10 | End: 2020-11-12

## 2020-11-10 RX ADMIN — METOPROLOL SUCCINATE 50 MG: 50 TABLET, EXTENDED RELEASE ORAL at 06:02

## 2020-11-10 RX ADMIN — Medication 10 ML: at 17:23

## 2020-11-10 RX ADMIN — ACETAMINOPHEN 650 MG: 325 TABLET ORAL at 06:02

## 2020-11-10 RX ADMIN — INSULIN LISPRO 3 UNITS: 100 INJECTION, SOLUTION INTRAVENOUS; SUBCUTANEOUS at 17:22

## 2020-11-10 RX ADMIN — LOSARTAN POTASSIUM 100 MG: 100 TABLET, FILM COATED ORAL at 06:02

## 2020-11-10 RX ADMIN — LORAZEPAM 1 MG: 2 INJECTION INTRAMUSCULAR; INTRAVENOUS at 18:35

## 2020-11-10 RX ADMIN — ROSUVASTATIN CALCIUM 10 MG: 10 TABLET, COATED ORAL at 10:51

## 2020-11-10 RX ADMIN — Medication 10 ML: at 17:22

## 2020-11-10 RX ADMIN — INSULIN LISPRO 2 UNITS: 100 INJECTION, SOLUTION INTRAVENOUS; SUBCUTANEOUS at 11:49

## 2020-11-10 RX ADMIN — INSULIN GLARGINE 21 UNITS: 100 INJECTION, SOLUTION SUBCUTANEOUS at 11:49

## 2020-11-10 RX ADMIN — TRIAMTERENE AND HYDROCHLOROTHIAZIDE 1 TABLET: 37.5; 25 TABLET ORAL at 10:51

## 2020-11-10 RX ADMIN — Medication 10 ML: at 23:26

## 2020-11-10 RX ADMIN — Medication 10 ML: at 11:52

## 2020-11-10 RX ADMIN — TRAMADOL HYDROCHLORIDE 50 MG: 50 TABLET, COATED ORAL at 23:24

## 2020-11-10 RX ADMIN — Medication 10 ML: at 03:41

## 2020-11-10 RX ADMIN — CEFTRIAXONE 1 G: 1 INJECTION, POWDER, FOR SOLUTION INTRAMUSCULAR; INTRAVENOUS at 03:41

## 2020-11-10 RX ADMIN — ENOXAPARIN SODIUM 40 MG: 40 INJECTION SUBCUTANEOUS at 23:25

## 2020-11-10 RX ADMIN — ENOXAPARIN SODIUM 40 MG: 40 INJECTION SUBCUTANEOUS at 10:54

## 2020-11-10 NOTE — PROGRESS NOTES
DOMINIC Plan:  - Daughter's home vs. group home? - Pt's daughter states that pt cannot return to living alone, daughter is considering looking into possible [facility] for future or home with daughter and private duty caregiver support  - Family to transport at d/c  - OP F/U Appt: PCP  - 2nd IMM letter         Reason for Admission:  Acute confusion                   RUR Score:  12% low risk for readmission             Plan for utilizing home health:  No HH hx        PCP: First and Last name:  Trent Castro MD   Name of Practice: [facility]   Are you a current patient: Yes/No: Yes   Approximate date of last visit: Uncertain   Can you participate in a virtual visit with your PCP: No                  Current Advanced Directive/Advance Care Plan: No ACP documents on file. Not appropriate to address AMD d/t pt's confusion. Transition of Care Plan:  Undetermined at this time - possibly d/c to daughter's home with private duty caregivers vs. [facility]        CM talked with pt's daughter, Luma Reed, via phone d/t pt's confusion. CM introduced self/role, verified demographics, and discussed discharge planning. Pt is a 69 y.o. who presented to [facility] ED with acute confusion. Pt's PMHx is significant for chronic osteoarthritis, diabetes, essential hypertension, mixed hyperlipidemia, etc.    Pt's transition of care plan is undetermined at this time - possibly d/c to daughter's home with private duty caregivers vs. group home. Pt's daughter states that pt was previously living alone, but that her daughter picked her up and brought her to her own home on last Monday d/t pt having hallucinations and being scared. Pt's daughter states that pt has continued to have visual hallucinations up until she was brought to the hospital. Pt's daughter reports \"bizarre behavior\" exhibited from pt in the last 2-3 weeks.  Pt's daughter also shared that pt has been on an online dating website and has connected with an individual that she has been sending large sums of money to recently. Pt is able to ambulate without assistive devices. No DME at home. Pt's daughter states that pt was previously independent at home, and was driving until last Monday. Pt was able to complete her community errands previously. Pharmacy preference is [facility] in [place], [place]. Daughter does not feel that it is appropriate for patient to return to her home living alone and is weighing options. CM discussed private pay caregiver support and private pay longterm options. Daughter to further review these options. While at daughter's home, pt did have family there to assist her as needed during the day, but would benefit from additional support from caregivers per daughter. CM to continue to follow for transition of care planning. Care Management Interventions  PCP Verified by CM:  Yes  Palliative Care Criteria Met (RRAT>21 & CHF Dx)?: No  Mode of Transport at Discharge: (Family)  Transition of Care Consult (CM Consult): Discharge Planning  Discharge Durable Medical Equipment: No(No DME at home)  Physical Therapy Consult: No  Occupational Therapy Consult: No  Speech Therapy Consult: No  Current Support Network: Lives Alone(Pt previously living alone, has been staying with daughter and family since last monday )  Confirm Follow Up Transport: Family  Discharge Location  Discharge Placement: Unable to determine at this time(Daughter's home vs. longterm )      Eduardo Gipson, [address] [facility], [address] [place] [address]

## 2020-11-10 NOTE — ED NOTES
Pt arrives via EMS due to a fall down 6 stairs face first. , VSS on squad. Pt arrives in a c-collar. EMS reports pt has early onset dementia accompanied with hallucinations thus causing this fall. Pt is living with daughter temporarily. Pt A&O x4. Monitor x2, VSS. Pt denies blood thinners. 2330- Pt appears fatigued, easly aroused, but quickly falls asleep. Notified MD. Will monitor. 0020- pt back from CT. RN accompanied pt with monitor for pt safety. Awaiting results. Pt remains in C-collar and lethargic. Labs sent. 0120- pt sleeping in bed. C-collar in place. Will monitor    0240- Pt straight cath for urine samples. Urine sent to lab. 400ml urine produced. Pt tolerated procedure well. Will monitor.

## 2020-11-10 NOTE — ED NOTES
TRANSFER - OUT REPORT:    Verbal report given to [employer] (name) on [place]  being transferred to Rm 1115(unit) for routine progression of care       Report consisted of patients Situation, Background, Assessment and   Recommendations(SBAR). Information from the following report(s) SBAR, ED Summary, Procedure Summary, Intake/Output and MAR was reviewed with the receiving nurse. Lines:   Peripheral IV 11/10/20 Left Hand (Active)   Site Assessment Clean, dry, & intact 11/10/20 0044   Phlebitis Assessment 0 11/10/20 0044   Infiltration Assessment 0 11/10/20 0044   Dressing Status Clean, dry, & intact 11/10/20 0044   Dressing Type Transparent 11/10/20 0044   Hub Color/Line Status Flushed 11/10/20 0044        Opportunity for questions and clarification was provided. Patient transported with:          Receiving nurse notified of need to call ortho consult as that had not yet been performed. Pt was in her duplex study and was called to be transported from Vascular to her inpatient room assignment of 1115. Belongings of shirt, shoes and pants sent up to patient room.

## 2020-11-10 NOTE — CONSULTS
Consult  REFERRED BY:  Laine Arias MD    CHIEF COMPLAINT: Hallucinations      Subjective:     Norbert Cole is a 69 y.o. right-handed -American female seen as a new patient to me, at the request of the hospitalist for evaluation of new problem of about a 5-week history of progressive hallucinations, with the patient seeing people who are not there, and they are talking to her and throwing things at her, and it seems like she knows some of them but does not know the rest of them but cannot tell me who they are. She is actually seen one in the room right now. She does not seem to agitated, and does not seem to have any major alteration in level of consciousness. She denies any focal weakness, sensory loss, cranial nerve problems, fever, headache, meningismus, or any other precipitating cause for this, denies any new medication, toxin exposure, as a cause of this. I did mention that stress commonly can cause these problems and she says she been under a lot of stress, but does not expound on what it might be. She has hypertension and diabetes, and hyperlipidemia, but does not seem to have any other major medical problems. She never had a history of stroke or TIA, and apparently has never had a history of psychiatric problems before. She is on no medication that normally can cause confusion. She did have a fall the day prior to admission apparently and hit her head and right hand, still has a fairly bruised hand is very tender. X-ray showed no fracture.     Past Medical History:   Diagnosis Date    Chronic osteoarthritis 7/17/2020    Diabetes ([address]     Elevated glucose 7/17/2020    Essential (primary) hypertension 7/17/2020    Menopause     Mixed hyperlipidemia 7/17/2020      Past Surgical History:   Procedure Laterality Date    HX BREAST BIOPSY Left 11/2019    HX GYN       Family History   Problem Relation Age of Onset    Heart Disease Mother     Heart Disease Brother       Social History     Tobacco Use    Smoking status: Current Every Day Smoker     Packs/day: 0.25    Smokeless tobacco: Never Used   Substance Use Topics    Alcohol use: Not Currently     Comment: Occasionally         Current Facility-Administered Medications:     sodium chloride (NS) flush 5-40 mL, 5-40 mL, IntraVENous, Q8H, Flor Kate MD, 10 mL at 11/10/20 1152    sodium chloride (NS) flush 5-40 mL, 5-40 mL, IntraVENous, PRN, Flor Kate MD    acetaminophen (TYLENOL) tablet 650 mg, 650 mg, Oral, Q6H PRN, 650 mg at 11/10/20 0602 **OR** acetaminophen (TYLENOL) suppository 650 mg, 650 mg, Rectal, Q6H PRN, Flor Kate MD    polyethylene glycol (MIRALAX) packet 17 g, 17 g, Oral, DAILY PRN, Flor Kate MD    losartan (COZAAR) tablet 100 mg, 100 mg, Oral, DAILY, Flor Kate MD, 100 mg at 11/10/20 0602    metoprolol succinate (TOPROL-XL) XL tablet 50 mg, 50 mg, Oral, DAILY, Flor Kate MD, 50 mg at 11/10/20 0602    rosuvastatin (CRESTOR) tablet 10 mg, 10 mg, Oral, DAILY, Flor Kate MD, 10 mg at 11/10/20 1051    triamterene-hydroCHLOROthiazide (MAXZIDE) 37.5-25 mg per tablet 1 Tab, 1 Tab, Oral, DAILY, Flor Kate MD, 1 Tab at 11/10/20 1051    glucose chewable tablet 16 g, 4 Tab, Oral, PRN, Flor Kate MD    dextrose (D50W) injection syrg 12.5-25 g, 25-50 mL, IntraVENous, PRN, Flor Kate MD    glucagon (GLUCAGEN) injection 1 mg, 1 mg, IntraMUSCular, PRN, Flor Kate MD    insulin glargine (LANTUS) injection 21 Units, 0.2 Units/kg, SubCUTAneous, DAILY, Flor Kate MD, 21 Units at 11/10/20 1149    insulin lispro (HUMALOG) injection, , SubCUTAneous, AC&HS, Flor Kate MD, 2 Units at 11/10/20 1149    enoxaparin (LOVENOX) injection 40 mg, 40 mg, SubCUTAneous, Q12H, Flor Kate MD, 40 mg at 11/10/20 1054    LORazepam (ATIVAN) injection 1-2 mg, 1-2 mg, IntraVENous, Q6H PRN, Derrick Rankin, Fabienne Otero MD    traMADoL Derik Luis Manuel) tablet 50 mg, 50 mg, Oral, Q6H PRN, Alfonzo, Garrett COSTA MD    sodium chloride (NS) flush 5-40 mL, 5-40 mL, IntraVENous, Q8H, Radha Wahl MD, Stopped at 11/10/20 0600    sodium chloride (NS) flush 5-40 mL, 5-40 mL, IntraVENous, PRN, Radha Wahl MD    Current Outpatient Medications:     permethrin (ACTICIN) 5 % topical cream, As directed, Disp: 60 g, Rfl: 0    Cetirizine (ZyrTEC) 10 mg cap, Take 10 mg by mouth daily. , Disp: 7 Cap, Rfl: 0    losartan (COZAAR) 100 mg tablet, Take 1 tablet by mouth once daily, Disp: 90 Tab, Rfl: 0    triamterene-hydroCHLOROthiazide (MAXZIDE) 37.5-25 mg per tablet, Take 1 tablet by mouth once daily, Disp: 90 Tab, Rfl: 0    metoprolol succinate (TOPROL-XL) 50 mg XL tablet, Take 1 tablet by mouth once daily, Disp: 90 Tab, Rfl: 0    metFORMIN (GLUCOPHAGE) 500 mg tablet, TAKE 1 TABLET BY MOUTH TWICE DAILY WITH MEALS, Disp: 180 Tab, Rfl: 0    omega-3 acid ethyl esters (LOVAZA) 1 gram capsule, Take 2 Caps by mouth two (2) times a day., Disp: 360 Cap, Rfl: 1    rosuvastatin (CRESTOR) 10 mg tablet, TAKE 1 TABLET BY MOUTH ONCE DAILY, Disp: , Rfl:         No Known Allergies   MRI Results (most recent):  No results found for this or any previous visit. No results found for this or any previous visit. Review of Systems:  A comprehensive review of systems was negative except for: Constitutional: positive for fatigue and malaise  Musculoskeletal: positive for myalgias, arthralgias, stiff joints and Right hand pain  Neurological: positive for Hallucinations  Behvioral/Psych: positive for Hallucinations, anxiety, behavior problems and depression   Vitals:    11/10/20 0845 11/10/20 0900 11/10/20 0915 11/10/20 0930   BP: (!) 113/57 110/61 128/64 (!) 119/56   Pulse: (!) 58 (!) 58 63 68   Resp: 19 21 18 19   Temp:       SpO2: 96%  94% 97%   Weight:       Height:         Objective:     I      NEUROLOGICAL EXAM:    Appearance:   The patient is well developed, well nourished, provides a fair history and is in no acute distress. Mental Status: Oriented to time, place and person, and the president, cognitive function is relatively normal except for the hallucinations and speech is fluent and no aphasia or dysarthria. Mood and affect appropriate. Cranial Nerves:   Intact visual fields. Fundi are benign, disc are flat, no lesions seen on funduscopy. DAJUAN, EOM's full, no nystagmus, no ptosis. Facial sensation is normal. Corneal reflexes are not tested. Facial movement is symmetric. Hearing is normal bilaterally. Palate is midline with normal sternocleidomastoid and trapezius muscles are normal. Tongue is midline. Neck without meningismus or bruits  Temporal arteries are not tender or enlarged  TMJ areas are not tender on palpation   Motor:  4/5 strength in upper and lower proximal and distal muscles, but she refuses to use the right hand much. . Normal bulk and tone. No fasciculations. Rapid alternating movement is symmetric and slow bilaterally   Reflexes:   Deep tendon reflexes 1+/4 and symmetrical with absent ankle jerks. No babinski or clonus present   Sensory:   Normal to touch, pinprick and vibration and temperature. DSS is intact   Gait:  Not tested   Tremor:   No tremor noted. Cerebellar:  Not tested cerebellar signs present on Romberg and tandem testing and finger-nose-finger exam is unremarkable. Neurovascular:  Normal heart sounds and regular rhythm, peripheral pulses decreased, and no carotid bruits.            Assessment:   [unfilled]  Active Problems:    Acute confusion (11/10/2020)      Hallucinations (11/10/2020)      Acute alteration in mental status (11/10/2020)      Complex partial seizures with impaired consciousness at onset [facility]) (11/10/2020)      Bilateral carotid artery stenosis (11/10/2020)      Cerebral microvascular disease (11/10/2020)      Psychosis ([facility] [address] (11/10/2020)        Plan:     Patient most likely having acute psychotic break, from some ill-defined stress or involutional psychosis, and has no focal neurological symptoms or signs other than hallucinations. She had a normal CT of the head and an MRI scan is pending and an EEG has been done  Patient has some possible cervical stenosis in the cervical spine, an MRI of the spine has been ordered also. We will check a carotid Doppler study if possible tomorrow. We we will review the EEG study and the MRI when it is done. Patient does not appear to have any active focal neurological deficits. We will follow carefully with you, continue excellent medical care as you are and Ativan ordered for the MRI's  Complete metabolic panel was also ordered to rule out other treatable causes of her symptoms.     Signed By: Eunice Carias MD     November 10, 2020       CC: Shanita Alves MD  FAX: [phone]

## 2020-11-10 NOTE — CONSULTS
ORTHOPAEDIC CONSULT NOTE    Subjective:     Date of Consultation:  November 10, 2020      Alonso De La Torre is a 69 y.o. female who is being seen for R wrist pain s/p GLF prior to admission. XR negative for fracture per radiology reading. + pain with ROM of wrist and fingers. Pt is L hand dominant. Patient Active Problem List    Diagnosis Date Noted    Acute confusion 11/10/2020    Hallucinations 11/10/2020    Acute alteration in mental status 11/10/2020    Complex partial seizures with impaired consciousness at onset [facility]) 11/10/2020    Bilateral carotid artery stenosis 11/10/2020    Cerebral microvascular disease 11/10/2020    Psychosis ([address] 11/10/2020    Chronic osteoarthritis 07/17/2020    Essential (primary) hypertension 07/17/2020    Elevated glucose 07/17/2020    Mixed hyperlipidemia 07/17/2020     Family History   Problem Relation Age of Onset    Heart Disease Mother     Heart Disease Brother       Social History     Tobacco Use    Smoking status: Current Every Day Smoker     Packs/day: 0.25    Smokeless tobacco: Never Used   Substance Use Topics    Alcohol use: Not Currently     Comment: Occasionally     Past Medical History:   Diagnosis Date    Chronic osteoarthritis 7/17/2020    Diabetes ([address]     Elevated glucose 7/17/2020    Essential (primary) hypertension 7/17/2020    Menopause     Mixed hyperlipidemia 7/17/2020      Past Surgical History:   Procedure Laterality Date    HX BREAST BIOPSY Left 11/2019    HX GYN        Prior to Admission medications    Medication Sig Start Date End Date Taking? Authorizing Provider   permethrin (ACTICIN) 5 % topical cream As directed 11/2/20 12/2/20  Therman Mohs, MD   Cetirizine (ZyrTEC) 10 mg cap Take 10 mg by mouth daily.  11/2/20   Therman Mohs, MD   losartan (COZAAR) 100 mg tablet Take 1 tablet by mouth once daily 10/26/20   Tianna Jon MD   triamterene-hydroCHLOROthiazide (MAXZIDE) 37.5-25 mg per tablet Take 1 tablet by mouth once daily 10/14/20   Lisbeth Jara MD   metoprolol succinate (TOPROL-XL) 50 mg XL tablet Take 1 tablet by mouth once daily 10/14/20   Lisbeth Jara MD   metFORMIN (GLUCOPHAGE) 500 mg tablet TAKE 1 TABLET BY MOUTH TWICE DAILY WITH MEALS 9/2/20   Lisbeth Jara MD   omega-3 acid ethyl esters (LOVAZA) 1 gram capsule Take 2 Caps by mouth two (2) times a day.  7/30/20   Lisbeth Jara MD   rosuvastatin (CRESTOR) 10 mg tablet TAKE 1 TABLET BY MOUTH ONCE DAILY 4/22/20   Provider, Historical     Current Facility-Administered Medications   Medication Dose Route Frequency    sodium chloride (NS) flush 5-40 mL  5-40 mL IntraVENous Q8H    sodium chloride (NS) flush 5-40 mL  5-40 mL IntraVENous PRN    acetaminophen (TYLENOL) tablet 650 mg  650 mg Oral Q6H PRN    Or    acetaminophen (TYLENOL) suppository 650 mg  650 mg Rectal Q6H PRN    polyethylene glycol (MIRALAX) packet 17 g  17 g Oral DAILY PRN    losartan (COZAAR) tablet 100 mg  100 mg Oral DAILY    metoprolol succinate (TOPROL-XL) XL tablet 50 mg  50 mg Oral DAILY    rosuvastatin (CRESTOR) tablet 10 mg  10 mg Oral DAILY    triamterene-hydroCHLOROthiazide (MAXZIDE) 37.5-25 mg per tablet 1 Tab  1 Tab Oral DAILY    glucose chewable tablet 16 g  4 Tab Oral PRN    dextrose (D50W) injection syrg 12.5-25 g  25-50 mL IntraVENous PRN    glucagon (GLUCAGEN) injection 1 mg  1 mg IntraMUSCular PRN    insulin glargine (LANTUS) injection 21 Units  0.2 Units/kg SubCUTAneous DAILY    insulin lispro (HUMALOG) injection   SubCUTAneous AC&HS    enoxaparin (LOVENOX) injection 40 mg  40 mg SubCUTAneous Q12H    LORazepam (ATIVAN) injection 1-2 mg  1-2 mg IntraVENous Q6H PRN    traMADoL (ULTRAM) tablet 50 mg  50 mg Oral Q6H PRN    sodium chloride (NS) flush 5-40 mL  5-40 mL IntraVENous Q8H    sodium chloride (NS) flush 5-40 mL  5-40 mL IntraVENous PRN      No Known Allergies     Review of Systems:  A comprehensive review of systems was negative except for that written in the HPI. Mental Status: no dementia    Objective:     Patient Vitals for the past 8 hrs:   BP Temp Pulse Resp SpO2   11/10/20 1455 (!) 113/95 98.6 °F (37 °C) 69 18 99 %   11/10/20 1230 (!) 123/59 98.5 °F (36.9 °C) 70  97 %     Temp (24hrs), Av.5 °F (36.9 °C), Min:98.2 °F (36.8 °C), Max:98.8 °F (37.1 °C)      Gen: Well-developed,  in no acute distress   Musc: RUE - + wrist edema, ecchymosis, and TTP of distal radius, ROM limited due pain, NVI    Skin: No skin breakdown noted.  Skin warm, pink, dry  Neuro: Cranial nerves are grossly intact, no focal motor weakness, follows commands appropriately   Psych: Good insight, oriented to person, place and time, alert    Imaging Review:  XR reviewed with Dr. Mik Rhodes and radiology reading     Labs:   Recent Results (from the past 24 hour(s))   EKG, 12 LEAD, INITIAL    Collection Time: 20 11:45 PM   Result Value Ref Range    Ventricular Rate 71 BPM    Atrial Rate 71 BPM    P-R Interval 146 ms    QRS Duration 90 ms    Q-T Interval 388 ms    QTC Calculation (Bezet) 421 ms    Calculated P Axis 61 degrees    Calculated R Axis -17 degrees    Calculated T Axis 6 degrees    Diagnosis       Normal sinus rhythm  Possible Left atrial enlargement  Left ventricular hypertrophy  No previous ECGs available     GLUCOSE, POC    Collection Time: 20 11:48 PM   Result Value Ref Range    Glucose (POC) 147 (H) 65 - 100 mg/dL    Performed by Cathie DOMINGUEZ    CBC WITH AUTOMATED DIFF    Collection Time: 20 11:49 PM   Result Value Ref Range    WBC 6.3 3.6 - 11.0 K/uL    RBC 3.98 3.80 - 5.20 M/uL    HGB 12.9 11.5 - 16.0 g/dL    HCT 39.1 35.0 - 47.0 %    MCV 98.2 80.0 - 99.0 FL    MCH 32.4 26.0 - 34.0 PG    MCHC 33.0 30.0 - 36.5 g/dL    RDW 12.9 11.5 - 14.5 %    PLATELET 282 (L) 505 - 400 K/uL    MPV 11.6 8.9 - 12.9 FL    NRBC 0.0 0  WBC    ABSOLUTE NRBC 0.00 0.00 - 0.01 K/uL    NEUTROPHILS 51 32 - 75 %    LYMPHOCYTES 36 12 - 49 %    MONOCYTES 11 5 - 13 %    EOSINOPHILS 1 0 - 7 %    BASOPHILS 1 0 - 1 %    IMMATURE GRANULOCYTES 0 0.0 - 0.5 %    ABS. NEUTROPHILS 3.2 1.8 - 8.0 K/UL    ABS. LYMPHOCYTES 2.2 0.8 - 3.5 K/UL    ABS. MONOCYTES 0.7 0.0 - 1.0 K/UL    ABS. EOSINOPHILS 0.1 0.0 - 0.4 K/UL    ABS. BASOPHILS 0.0 0.0 - 0.1 K/UL    ABS. IMM. GRANS. 0.0 0.00 - 0.04 K/UL    DF AUTOMATED     METABOLIC PANEL, COMPREHENSIVE    Collection Time: 11/09/20 11:49 PM   Result Value Ref Range    Sodium 139 136 - 145 mmol/L    Potassium 3.9 3.5 - 5.1 mmol/L    Chloride 106 97 - 108 mmol/L    CO2 27 21 - 32 mmol/L    Anion gap 6 5 - 15 mmol/L    Glucose 164 (H) 65 - 100 mg/dL    BUN 25 (H) 6 - 20 MG/DL    Creatinine 1.27 (H) 0.55 - 1.02 MG/DL    BUN/Creatinine ratio 20 12 - 20      GFR est AA 50 (L) >60 ml/min/1.73m2    GFR est non-AA 41 (L) >60 ml/min/1.73m2    Calcium 9.8 8.5 - 10.1 MG/DL    Bilirubin, total 0.5 0.2 - 1.0 MG/DL    ALT (SGPT) 34 12 - 78 U/L    AST (SGOT) 23 15 - 37 U/L    Alk. phosphatase 78 45 - 117 U/L    Protein, total 7.6 6.4 - 8.2 g/dL    Albumin 3.6 3.5 - 5.0 g/dL    Globulin 4.0 2.0 - 4.0 g/dL    A-G Ratio 0.9 (L) 1.1 - 2.2     SAMPLES BEING HELD    Collection Time: 11/09/20 11:49 PM   Result Value Ref Range    SAMPLES BEING HELD BL     COMMENT        Add-on orders for these samples will be processed based on acceptable specimen integrity and analyte stability, which may vary by analyte.    CK W/ CKMB & INDEX    Collection Time: 11/09/20 11:49 PM   Result Value Ref Range    CK - MB 3.8 (H) <3.6 NG/ML    CK-MB Index 1.3 0.0 - 2.5       (H) 26 - 192 U/L   TROPONIN I    Collection Time: 11/09/20 11:49 PM   Result Value Ref Range    Troponin-I, Qt. <0.05 <0.05 ng/mL   ETHYL ALCOHOL    Collection Time: 11/10/20 12:09 AM   Result Value Ref Range    ALCOHOL(ETHYL),SERUM <10 <10 MG/DL   DRUG SCREEN, URINE    Collection Time: 11/10/20  2:39 AM   Result Value Ref Range    AMPHETAMINES Negative NEG      BARBITURATES Negative NEG      BENZODIAZEPINES Negative NEG      COCAINE Negative NEG      METHADONE Negative NEG      OPIATES Negative NEG      PCP(PHENCYCLIDINE) Negative NEG      THC (TH-CANNABINOL) Negative NEG      Drug screen comment (NOTE)    HEMOGLOBIN A1C WITH EAG    Collection Time: 11/10/20  6:07 AM   Result Value Ref Range    Hemoglobin A1c 7.4 (H) 4.0 - 5.6 %    Est. average glucose 166 mg/dL   GLUCOSE, POC    Collection Time: 11/10/20 10:57 AM   Result Value Ref Range    Glucose (POC) 243 (H) 65 - 100 mg/dL    Performed by Sergio DOMINGUEZ    DUPLEX CAROTID BILATERAL    Collection Time: 11/10/20  2:14 PM   Result Value Ref Range    Right subclavian sys 58.1 cm/s    RIGHT SUBCLAVIAN ARTERY D 0.00 cm/s    Right cca dist sys 88.8 cm/s    Right CCA dist dickinson 24.4 cm/s    Right CCA prox sys 85.7 cm/s    Right CCA prox dickinson 15.2 cm/s    Right ICA dist sys 62.3 cm/s    Right ICA dist dickinson 18.3 cm/s    Right ICA mid sys 64.9 cm/s    Right ICA mid dickinson 19.5 cm/s    Right ICA prox sys 50.5 cm/s    Right ICA prox dickinson 7.5 cm/s    Right eca sys 71.9 cm/s    RIGHT EXTERNAL CAROTID ARTERY D 7.67 cm/s    Right vertebral sys 56.1 cm/s    RIGHT VERTEBRAL ARTERY D 16.41 cm/s    Right ICA/CCA sys 0.7     Left subclavian sys 131.5 cm/s    LEFT SUBCLAVIAN ARTERY D 0.00 cm/s    Left CCA dist sys 96.4 cm/s    Left CCA dist dickinson 29.4 cm/s    Left CCA prox sys 108.7 cm/s    Left CCA prox dickinson 23.8 cm/s    Left ICA dist sys 97.3 cm/s    Left ICA dist dickinson 28.4 cm/s    Left ICA mid sys 89.5 cm/s    Left ICA mid dickinson 27.3 cm/s    Left ICA prox sys 74.7 cm/s    Left ICA prox dickinson 17.5 cm/s    Left ECA sys 80.3 cm/s    LEFT EXTERNAL CAROTID ARTERY D 9.89 cm/s    Left vertebral sys 42.8 cm/s    LEFT VERTEBRAL ARTERY D 11.06 cm/s    Left ICA/CCA sys 1.01    GLUCOSE, POC    Collection Time: 11/10/20  4:44 PM   Result Value Ref Range    Glucose (POC) 258 (H) 65 - 100 mg/dL    Performed by [employer] (DMITRY)          Impression:     Patient Active Problem List    Diagnosis Date Noted  Acute confusion 11/10/2020    Hallucinations 11/10/2020    Acute alteration in mental status 11/10/2020    Complex partial seizures with impaired consciousness at onset [facility]) 11/10/2020    Bilateral carotid artery stenosis 11/10/2020    Cerebral microvascular disease 11/10/2020    Psychosis ([facility] 11/10/2020    Chronic osteoarthritis 07/17/2020    Essential (primary) hypertension 07/17/2020    Elevated glucose 07/17/2020    Mixed hyperlipidemia 07/17/2020     Active Problems:    Acute confusion (11/10/2020)      Hallucinations (11/10/2020)      Acute alteration in mental status (11/10/2020)      Complex partial seizures with impaired consciousness at onset [facility]) (11/10/2020)      Bilateral carotid artery stenosis (11/10/2020)      Cerebral microvascular disease (11/10/2020)      Psychosis ([facility] (11/10/2020)        Plan:   -  Pt is stable orthopaedically, Non-Operative management at this time  -  Probable distal radius styloid fracture upon review of images - pain management,ice and elevate of with wrist splint placed with plan for NWB until repeat XR in 2 weeks   -  Follow up with Dr. Cabrera Peers in 2 weeks, please call for appt at discharge. We will sign off at this time, call with any questions or concerns. Dr. Cabrera Peers aware and agrees with plan as above.         Nicole Delacruz, NP  Orthopedic Nurse Practitioner   [place]

## 2020-11-10 NOTE — PROGRESS NOTES
1406: Attempted to [facility] ED for report x2. Please call [phone] when ready for report. 11/10: TRANSFER - IN REPORT:    Verbal report received from [place](name) on Sara Maddox  being received from ED(unit) for routine progression of care      Report consisted of patients Situation, Background, Assessment and   Recommendations(SBAR). Information from the following report(s) SBAR, Kardex, ED Summary, Intake/Output, MAR, Accordion and Recent Results was reviewed with the receiving nurse. Opportunity for questions and clarification was provided. Assessment completed upon patients arrival to unit and care assumed.

## 2020-11-10 NOTE — ED NOTES
Pt returned from MRI, Unable to perform test due to patient agitation. Pt repeated states she will not do the test and pt would not lay still to do the test at this time.

## 2020-11-10 NOTE — PROGRESS NOTES
Hospitalist Progress Note    NAME: Salma Calles   :  1950   MRN:  [id number]     Subjective:   Daily Progress Note: 11/10/2020 12:49 PM      Chief complaint: fall/AMS  Patient seen  In ER admitted early today. C/o R wrist pain and swelling. She refused MRI. Current Facility-Administered Medications   Medication Dose Route Frequency    sodium chloride (NS) flush 5-40 mL  5-40 mL IntraVENous Q8H    sodium chloride (NS) flush 5-40 mL  5-40 mL IntraVENous PRN    acetaminophen (TYLENOL) tablet 650 mg  650 mg Oral Q6H PRN    Or    acetaminophen (TYLENOL) suppository 650 mg  650 mg Rectal Q6H PRN    polyethylene glycol (MIRALAX) packet 17 g  17 g Oral DAILY PRN    losartan (COZAAR) tablet 100 mg  100 mg Oral DAILY    metoprolol succinate (TOPROL-XL) XL tablet 50 mg  50 mg Oral DAILY    rosuvastatin (CRESTOR) tablet 10 mg  10 mg Oral DAILY    triamterene-hydroCHLOROthiazide (MAXZIDE) 37.5-25 mg per tablet 1 Tab  1 Tab Oral DAILY    glucose chewable tablet 16 g  4 Tab Oral PRN    dextrose (D50W) injection syrg 12.5-25 g  25-50 mL IntraVENous PRN    glucagon (GLUCAGEN) injection 1 mg  1 mg IntraMUSCular PRN    insulin glargine (LANTUS) injection 21 Units  0.2 Units/kg SubCUTAneous DAILY    insulin lispro (HUMALOG) injection   SubCUTAneous AC&HS    enoxaparin (LOVENOX) injection 40 mg  40 mg SubCUTAneous Q12H    LORazepam (ATIVAN) injection 1-2 mg  1-2 mg IntraVENous Q6H PRN    sodium chloride (NS) flush 5-40 mL  5-40 mL IntraVENous Q8H    sodium chloride (NS) flush 5-40 mL  5-40 mL IntraVENous PRN     Current Outpatient Medications   Medication Sig    permethrin (ACTICIN) 5 % topical cream As directed    Cetirizine (ZyrTEC) 10 mg cap Take 10 mg by mouth daily.     losartan (COZAAR) 100 mg tablet Take 1 tablet by mouth once daily    triamterene-hydroCHLOROthiazide (MAXZIDE) 37.5-25 mg per tablet Take 1 tablet by mouth once daily    metoprolol succinate (TOPROL-XL) 50 mg XL tablet Take 1 tablet by mouth once daily    metFORMIN (GLUCOPHAGE) 500 mg tablet TAKE 1 TABLET BY MOUTH TWICE DAILY WITH MEALS    omega-3 acid ethyl esters (LOVAZA) 1 gram capsule Take 2 Caps by mouth two (2) times a day.     rosuvastatin (CRESTOR) 10 mg tablet TAKE 1 TABLET BY MOUTH ONCE DAILY          Objective:     Visit Vitals  BP (!) 119/56   Pulse 68   Temp 98.8 °F (37.1 °C)   Resp 19   Ht 5' 2\" (1.575 m)   Wt 105 kg (231 lb 7.7 oz)   SpO2 97%   BMI 42.34 kg/m²      O2 Device: Room air    Temp (24hrs), Av.6 °F (37 °C), Min:98.2 °F (36.8 °C), Max:98.8 °F (37.1 °C)        Physical Exam:    Gen: Well-developed, well-nourished, in no acute distress  HEENT:    moist mucous membranes  Neck: Supple  Resp: No accessory muscle use,   Card: No murmurs, normal S1, S2 without thrills or peripheral edema  Abd:   Soft, non-tender, obese  Musc: R wrist swelling and dec ROM  Skin: No rashes   Neuro:    follows commands appropriately  Psych:  Flat affect        Data Review    Recent Results (from the past 24 hour(s))   EKG, 12 LEAD, INITIAL    Collection Time: 20 11:45 PM   Result Value Ref Range    Ventricular Rate 71 BPM    Atrial Rate 71 BPM    P-R Interval 146 ms    QRS Duration 90 ms    Q-T Interval 388 ms    QTC Calculation (Bezet) 421 ms    Calculated P Axis 61 degrees    Calculated R Axis -17 degrees    Calculated T Axis 6 degrees    Diagnosis       Normal sinus rhythm  Possible Left atrial enlargement  Left ventricular hypertrophy  No previous ECGs available     GLUCOSE, POC    Collection Time: 20 11:48 PM   Result Value Ref Range    Glucose (POC) 147 (H) 65 - 100 mg/dL    Performed by Benny Lebron BSN    CBC WITH AUTOMATED DIFF    Collection Time: 20 11:49 PM   Result Value Ref Range    WBC 6.3 3.6 - 11.0 K/uL    RBC 3.98 3.80 - 5.20 M/uL    HGB 12.9 11.5 - 16.0 g/dL    HCT 39.1 35.0 - 47.0 %    MCV 98.2 80.0 - 99.0 FL    MCH 32.4 26.0 - 34.0 PG    MCHC 33.0 30.0 - 36.5 g/dL    RDW 12.9 11.5 - 14.5 %    PLATELET 375 (L) 854 - 400 K/uL    MPV 11.6 8.9 - 12.9 FL    NRBC 0.0 0  WBC    ABSOLUTE NRBC 0.00 0.00 - 0.01 K/uL    NEUTROPHILS 51 32 - 75 %    LYMPHOCYTES 36 12 - 49 %    MONOCYTES 11 5 - 13 %    EOSINOPHILS 1 0 - 7 %    BASOPHILS 1 0 - 1 %    IMMATURE GRANULOCYTES 0 0.0 - 0.5 %    ABS. NEUTROPHILS 3.2 1.8 - 8.0 K/UL    ABS. LYMPHOCYTES 2.2 0.8 - 3.5 K/UL    ABS. MONOCYTES 0.7 0.0 - 1.0 K/UL    ABS. EOSINOPHILS 0.1 0.0 - 0.4 K/UL    ABS. BASOPHILS 0.0 0.0 - 0.1 K/UL    ABS. IMM. GRANS. 0.0 0.00 - 0.04 K/UL    DF AUTOMATED     METABOLIC PANEL, COMPREHENSIVE    Collection Time: 11/09/20 11:49 PM   Result Value Ref Range    Sodium 139 136 - 145 mmol/L    Potassium 3.9 3.5 - 5.1 mmol/L    Chloride 106 97 - 108 mmol/L    CO2 27 21 - 32 mmol/L    Anion gap 6 5 - 15 mmol/L    Glucose 164 (H) 65 - 100 mg/dL    BUN 25 (H) 6 - 20 MG/DL    Creatinine 1.27 (H) 0.55 - 1.02 MG/DL    BUN/Creatinine ratio 20 12 - 20      GFR est AA 50 (L) >60 ml/min/1.73m2    GFR est non-AA 41 (L) >60 ml/min/1.73m2    Calcium 9.8 8.5 - 10.1 MG/DL    Bilirubin, total 0.5 0.2 - 1.0 MG/DL    ALT (SGPT) 34 12 - 78 U/L    AST (SGOT) 23 15 - 37 U/L    Alk. phosphatase 78 45 - 117 U/L    Protein, total 7.6 6.4 - 8.2 g/dL    Albumin 3.6 3.5 - 5.0 g/dL    Globulin 4.0 2.0 - 4.0 g/dL    A-G Ratio 0.9 (L) 1.1 - 2.2     SAMPLES BEING HELD    Collection Time: 11/09/20 11:49 PM   Result Value Ref Range    SAMPLES BEING HELD BL     COMMENT        Add-on orders for these samples will be processed based on acceptable specimen integrity and analyte stability, which may vary by analyte.    CK W/ CKMB & INDEX    Collection Time: 11/09/20 11:49 PM   Result Value Ref Range    CK - MB 3.8 (H) <3.6 NG/ML    CK-MB Index 1.3 0.0 - 2.5       (H) 26 - 192 U/L   TROPONIN I    Collection Time: 11/09/20 11:49 PM   Result Value Ref Range    Troponin-I, Qt. <0.05 <0.05 ng/mL   ETHYL ALCOHOL    Collection Time: 11/10/20 12:09 AM   Result Value Ref Range ALCOHOL(ETHYL),SERUM <10 <10 MG/DL   DRUG SCREEN, URINE    Collection Time: 11/10/20  2:39 AM   Result Value Ref Range    AMPHETAMINES Negative NEG      BARBITURATES Negative NEG      BENZODIAZEPINES Negative NEG      COCAINE Negative NEG      METHADONE Negative NEG      OPIATES Negative NEG      PCP(PHENCYCLIDINE) Negative NEG      THC (TH-CANNABINOL) Negative NEG      Drug screen comment (NOTE)    HEMOGLOBIN A1C WITH EAG    Collection Time: 11/10/20  6:07 AM   Result Value Ref Range    Hemoglobin A1c 7.4 (H) 4.0 - 5.6 %    Est. average glucose 166 mg/dL   GLUCOSE, POC    Collection Time: 11/10/20 10:57 AM   Result Value Ref Range    Glucose (POC) 243 (H) 65 - 100 mg/dL    Performed by Julio WHITAKERN      No results found for this visit on 11/09/20. All Micro Results     None           Radiology reports and films for the last 24 hours have been reviewed. Assessment/Plan:     Acute/subacute delusional episode  f/u  TSH, B12, FTAABS  Brain MRI  She refused  Neurology//Psychiatry to see    R wrist pain swelling  xrays ok, ortho to see     Hypertension     home meds metoprolol, losartan, triamterenehydrochlorothiazide     DM  ty 2  - hold Metformin for now    on basal insulin and sliding scale     Hyperlipidemia  Continue rosuvastatin     Asymptomatic bacteriuria   hold Abx          Code Status: Full code  Surrogate Decision Maker: Kamila Koenig  DVT Prophylaxis: Lovenox  Baseline:  Independent in ADLs    Signed By: Kayode Portillo MD     November 10, 2020 100

## 2020-11-10 NOTE — H&P
.                  Hospitalist Admission Note    NAME: Bambi Schmidt   :  1950   MRN:  [id number]     Date/Time:  11/10/2020 5:06 AM    Patient PCP: Tyrone Santamaria MD  ______________________________________________________________________  Given the patient's current clinical presentation, I have a high level of concern for decompensation if discharged from the emergency department. Complex decision making was performed, which includes reviewing the patient's available past medical records, laboratory results, and x-ray films. My assessment of this patient's clinical condition and my plan of care is as follows. Assessment / Plan:  Acute/subacute delusional episode  Concerning for progressive neurocognitive pathology  Will order metabolic/infective work-up including TSH, B12, FTAABS  Brain MRI  Neurology consultation  Psychiatry consultation    Hypertension  We will continue regular home treatments (metoprolol, losartan, triamterenehydrochlorothiazide)    Type 2 diabetes  We will hold off Metformin for now  We will start on basal insulin and sliding scale    Hyperlipidemia  Continue rosuvastatin    Asymptomatic bacteriuria  We will hold off anti-infective treatment as evidence-base does not support treatment of asymptomatic bacteriuria even in the setting of delirium. Code Status: Full code  Surrogate Decision Maker: Rita Rendon    DVT Prophylaxis: Lovenox  GI Prophylaxis: not indicated    Baseline: Independent in ADLs      Subjective:   CHIEF COMPLAINT: Somebody gave me something    HISTORY OF PRESENT ILLNESS:     Yoel Rhodes is a 69 y.o.  female who presents brought by her daughter after she had sustained a mechanical fall at home trying to get out of the bathroom tripped over a few steps. She had fell onto her right side causing her to have some wrist pain bumped her head on the right side. She denied any loss of consciousness. No palpitation.   No chest pain no shortness of breath  States more significantly had started about a month ago and     the patient's own words \"somebody gave me something. I am seeing people, they want to take money from me, but I know they are not there. People are scratching on my stomach. Somebody is practicing [employer] stuff. They are doing it from wherever they are. Somebody I do not want to name is doing it. Looks like he is out there right now. He has given me something. My daughter said she got sick on her stomach. They come during the day, in the evening and during the night. I have not been able to sleep for month. There has been bugs in my food when I was at home. \"    Otherwise she was telling me that she had a fall where she slipped about 11 pm, did not loose consciousness, hit the right side of the head,no nausea. No palpitaion, no shortness of breath. No fever or chills. We were asked to admit for work up and evaluation of the above problems. Past Medical History:   Diagnosis Date    Chronic osteoarthritis 7/17/2020    Diabetes ([address]     Elevated glucose 7/17/2020    Essential (primary) hypertension 7/17/2020    Menopause     Mixed hyperlipidemia 7/17/2020        Past Surgical History:   Procedure Laterality Date    HX BREAST BIOPSY Left 11/2019    HX GYN         Social History     Tobacco Use    Smoking status: Current Every Day Smoker     Packs/day: 0.25    Smokeless tobacco: Never Used   Substance Use Topics    Alcohol use: Not Currently     Comment: Occasionally        Family History   Problem Relation Age of Onset    Heart Disease Mother     Heart Disease Brother      No Known Allergies     Prior to Admission medications    Medication Sig Start Date End Date Taking? Authorizing Provider   permethrin (ACTICIN) 5 % topical cream As directed 11/2/20 12/2/20  Lior Marrufo MD   Cetirizine (ZyrTEC) 10 mg cap Take 10 mg by mouth daily.  11/2/20   Lior Marrufo MD   losartan (COZAAR) 100 mg tablet Take 1 tablet by mouth once daily 10/26/20   Dahlia Clifton MD   triamterene-hydroCHLOROthiazide [facility]) 37.5-25 mg per tablet Take 1 tablet by mouth once daily 10/14/20   Dahlia Clifton MD   metoprolol succinate (TOPROL-XL) 50 mg XL tablet Take 1 tablet by mouth once daily 10/14/20   Dahlia Clifton MD   metFORMIN (GLUCOPHAGE) 500 mg tablet TAKE 1 TABLET BY MOUTH TWICE DAILY WITH MEALS 9/2/20   Dahlia Clifton MD   omega-3 acid ethyl esters (LOVAZA) 1 gram capsule Take 2 Caps by mouth two (2) times a day. 7/30/20   Dahlia Clifton MD   rosuvastatin (CRESTOR) 10 mg tablet TAKE 1 TABLET BY MOUTH ONCE DAILY 4/22/20   Provider, Historical       REVIEW OF SYSTEMS:     I am not able to complete the review of systems because:    The patient is intubated and sedated    The patient has altered mental status due to his acute medical problems    The patient has baseline aphasia from prior stroke(s)    The patient has baseline dementia and is not reliable historian    The patient is in acute medical distress and unable to provide information           Total of 12 systems reviewed as follows:       POSITIVE= underlined text  Negative = text not underlined  General:  fever, chills, sweats, generalized weakness, weight loss/gain,      loss of appetite   Eyes:    blurred vision, eye pain, loss of vision, double vision  ENT:    rhinorrhea, pharyngitis   Respiratory:   cough, sputum production, SOB, HERMAN, wheezing, pleuritic pain   Cardiology:   chest pain, palpitations, orthopnea, PND, edema, syncope   Gastrointestinal:  abdominal pain , N/V, diarrhea, dysphagia, constipation, bleeding   Genitourinary:  frequency, urgency, dysuria, hematuria, incontinence   Muskuloskeletal :  arthralgia, myalgia, back pain  Hematology:  easy bruising, nose or gum bleeding, lymphadenopathy   Dermatological: rash, ulceration, pruritis, color change / jaundice  Endocrine:   hot flashes or polydipsia   Neurological:  headache, dizziness, confusion, focal weakness, paresthesia,     Speech difficulties, memory loss, gait difficulty  Psychological: Feelings of anxiety, depression, agitation    Objective:   VITALS:    Visit Vitals  /63   Pulse 69   Temp 98.8 °F (37.1 °C)   Resp 17   Ht 5' 2\" (1.575 m)   Wt 105 kg (231 lb 7.7 oz)   SpO2 96%   BMI 42.34 kg/m²       PHYSICAL EXAM:    General:    Alert, cooperative, no distress, appears stated age. HEENT: Atraumatic, anicteric sclerae, pink conjunctivae     No oral ulcers, mucosa moist, throat clear, dentition fair  Neck:  Supple, symmetrical,  thyroid: non tender  Lungs:   Clear to auscultation bilaterally. No Wheezing or Rhonchi. No rales. Chest wall:  No tenderness  No Accessory muscle use. Heart:   Regular  rhythm,  No  murmur   No edema  Abdomen:   Soft, non-tender. Not distended. Bowel sounds normal  Extremities: No cyanosis. No clubbing,      Skin turgor normal, Capillary refill normal, Radial dial pulse 2+  Skin:     Not pale. Not Jaundiced  No rashes   Psych:  Good insight. Not depressed. Not anxious or agitated. Neurologic: EOMs intact. No facial asymmetry. No aphasia or slurred speech. Symmetrical strength, Sensation grossly intact.  Alert and oriented X 4.     _______________________________________________________________________  Care Plan discussed with:    Comments   Patient x    Family      RN     Care Manager                    Consultant:      _______________________________________________________________________  Expected  Disposition:   Home with Family x   HH/PT/OT/RN    SNF/LTC    ADDIS    ________________________________________________________________________  TOTAL TIME:  52 Minutes    Critical Care Provided     Minutes non procedure based      Comments    x Reviewed previous records   >50% of visit spent in counseling and coordination of care x Discussion with patient and/or family and questions answered ________________________________________________________________________  Signed: Shelley Killian MD    Procedures: see electronic medical records for all procedures/Xrays and details which were not copied into this note but were reviewed prior to creation of Plan. LAB DATA REVIEWED:    Recent Results (from the past 24 hour(s))   EKG, 12 LEAD, INITIAL    Collection Time: 11/09/20 11:45 PM   Result Value Ref Range    Ventricular Rate 71 BPM    Atrial Rate 71 BPM    P-R Interval 146 ms    QRS Duration 90 ms    Q-T Interval 388 ms    QTC Calculation (Bezet) 421 ms    Calculated P Axis 61 degrees    Calculated R Axis -17 degrees    Calculated T Axis 6 degrees    Diagnosis       Normal sinus rhythm  Possible Left atrial enlargement  Left ventricular hypertrophy  No previous ECGs available     GLUCOSE, POC    Collection Time: 11/09/20 11:48 PM   Result Value Ref Range    Glucose (POC) 147 (H) 65 - 100 mg/dL    Performed by Indy Henriquez BSN    CBC WITH AUTOMATED DIFF    Collection Time: 11/09/20 11:49 PM   Result Value Ref Range    WBC 6.3 3.6 - 11.0 K/uL    RBC 3.98 3.80 - 5.20 M/uL    HGB 12.9 11.5 - 16.0 g/dL    HCT 39.1 35.0 - 47.0 %    MCV 98.2 80.0 - 99.0 FL    MCH 32.4 26.0 - 34.0 PG    MCHC 33.0 30.0 - 36.5 g/dL    RDW 12.9 11.5 - 14.5 %    PLATELET 644 (L) 535 - 400 K/uL    MPV 11.6 8.9 - 12.9 FL    NRBC 0.0 0  WBC    ABSOLUTE NRBC 0.00 0.00 - 0.01 K/uL    NEUTROPHILS 51 32 - 75 %    LYMPHOCYTES 36 12 - 49 %    MONOCYTES 11 5 - 13 %    EOSINOPHILS 1 0 - 7 %    BASOPHILS 1 0 - 1 %    IMMATURE GRANULOCYTES 0 0.0 - 0.5 %    ABS. NEUTROPHILS 3.2 1.8 - 8.0 K/UL    ABS. LYMPHOCYTES 2.2 0.8 - 3.5 K/UL    ABS. MONOCYTES 0.7 0.0 - 1.0 K/UL    ABS. EOSINOPHILS 0.1 0.0 - 0.4 K/UL    ABS. BASOPHILS 0.0 0.0 - 0.1 K/UL    ABS. IMM.  GRANS. 0.0 0.00 - 0.04 K/UL    DF AUTOMATED     METABOLIC PANEL, COMPREHENSIVE    Collection Time: 11/09/20 11:49 PM   Result Value Ref Range    Sodium 139 136 - 145 mmol/L Potassium 3.9 3.5 - 5.1 mmol/L    Chloride 106 97 - 108 mmol/L    CO2 27 21 - 32 mmol/L    Anion gap 6 5 - 15 mmol/L    Glucose 164 (H) 65 - 100 mg/dL    BUN 25 (H) 6 - 20 MG/DL    Creatinine 1.27 (H) 0.55 - 1.02 MG/DL    BUN/Creatinine ratio 20 12 - 20      GFR est AA 50 (L) >60 ml/min/1.73m2    GFR est non-AA 41 (L) >60 ml/min/1.73m2    Calcium 9.8 8.5 - 10.1 MG/DL    Bilirubin, total 0.5 0.2 - 1.0 MG/DL    ALT (SGPT) 34 12 - 78 U/L    AST (SGOT) 23 15 - 37 U/L    Alk. phosphatase 78 45 - 117 U/L    Protein, total 7.6 6.4 - 8.2 g/dL    Albumin 3.6 3.5 - 5.0 g/dL    Globulin 4.0 2.0 - 4.0 g/dL    A-G Ratio 0.9 (L) 1.1 - 2.2     SAMPLES BEING HELD    Collection Time: 11/09/20 11:49 PM   Result Value Ref Range    SAMPLES BEING HELD BL     COMMENT        Add-on orders for these samples will be processed based on acceptable specimen integrity and analyte stability, which may vary by analyte.    CK W/ CKMB & INDEX    Collection Time: 11/09/20 11:49 PM   Result Value Ref Range    CK - MB 3.8 (H) <3.6 NG/ML    CK-MB Index 1.3 0.0 - 2.5       (H) 26 - 192 U/L   TROPONIN I    Collection Time: 11/09/20 11:49 PM   Result Value Ref Range    Troponin-I, Qt. <0.05 <0.05 ng/mL   ETHYL ALCOHOL    Collection Time: 11/10/20 12:09 AM   Result Value Ref Range    ALCOHOL(ETHYL),SERUM <10 <10 MG/DL   DRUG SCREEN, URINE    Collection Time: 11/10/20  2:39 AM   Result Value Ref Range    AMPHETAMINES Negative NEG      BARBITURATES Negative NEG      BENZODIAZEPINES Negative NEG      COCAINE Negative NEG      METHADONE Negative NEG      OPIATES Negative NEG      PCP(PHENCYCLIDINE) Negative NEG      THC (TH-CANNABINOL) Negative NEG      Drug screen comment (NOTE)

## 2020-11-10 NOTE — PROGRESS NOTES

## 2020-11-11 LAB
APPEARANCE UR: ABNORMAL
ATRIAL RATE: 71 BPM
BACTERIA URNS QL MICRO: ABNORMAL /HPF
BILIRUB UR QL: NEGATIVE
CALCULATED P AXIS, ECG09: 61 DEGREES
CALCULATED R AXIS, ECG10: -17 DEGREES
CALCULATED T AXIS, ECG11: 6 DEGREES
COLOR UR: ABNORMAL
DIAGNOSIS, 93000: NORMAL
EPITH CASTS URNS QL MICRO: ABNORMAL /LPF
GLUCOSE BLD STRIP.AUTO-MCNC: 150 MG/DL (ref 65–100)
GLUCOSE BLD STRIP.AUTO-MCNC: 200 MG/DL (ref 65–100)
GLUCOSE BLD STRIP.AUTO-MCNC: 238 MG/DL (ref 65–100)
GLUCOSE BLD STRIP.AUTO-MCNC: 285 MG/DL (ref 65–100)
GLUCOSE UR STRIP.AUTO-MCNC: NEGATIVE MG/DL
HGB UR QL STRIP: NEGATIVE
HYALINE CASTS URNS QL MICRO: ABNORMAL /LPF (ref 0–5)
KETONES UR QL STRIP.AUTO: NEGATIVE MG/DL
LEUKOCYTE ESTERASE UR QL STRIP.AUTO: ABNORMAL
NITRITE UR QL STRIP.AUTO: POSITIVE
P-R INTERVAL, ECG05: 146 MS
PH UR STRIP: 6 [PH] (ref 5–8)
PROT UR STRIP-MCNC: ABNORMAL MG/DL
Q-T INTERVAL, ECG07: 388 MS
QRS DURATION, ECG06: 90 MS
QTC CALCULATION (BEZET), ECG08: 421 MS
RBC #/AREA URNS HPF: ABNORMAL /HPF (ref 0–5)
SERVICE CMNT-IMP: ABNORMAL
SP GR UR REFRACTOMETRY: 1.02 (ref 1–1.03)
TSH SERPL DL<=0.05 MIU/L-ACNC: 1.37 UIU/ML (ref 0.36–3.74)
UA: UC IF INDICATED,UAUC: ABNORMAL
UROBILINOGEN UR QL STRIP.AUTO: 1 EU/DL (ref 0.2–1)
VENTRICULAR RATE, ECG03: 71 BPM
VIT B12 SERPL-MCNC: 416 PG/ML (ref 193–986)
WBC URNS QL MICRO: ABNORMAL /HPF (ref 0–4)

## 2020-11-11 PROCEDURE — 99233 SBSQ HOSP IP/OBS HIGH 50: CPT | Performed by: PSYCHIATRY & NEUROLOGY

## 2020-11-11 PROCEDURE — 65270000015 HC RM PRIVATE ONCOLOGY

## 2020-11-11 PROCEDURE — 36415 COLL VENOUS BLD VENIPUNCTURE: CPT

## 2020-11-11 PROCEDURE — 74011250636 HC RX REV CODE- 250/636: Performed by: STUDENT IN AN ORGANIZED HEALTH CARE EDUCATION/TRAINING PROGRAM

## 2020-11-11 PROCEDURE — 74011250637 HC RX REV CODE- 250/637: Performed by: NURSE PRACTITIONER

## 2020-11-11 PROCEDURE — 82962 GLUCOSE BLOOD TEST: CPT

## 2020-11-11 PROCEDURE — 74011250637 HC RX REV CODE- 250/637: Performed by: STUDENT IN AN ORGANIZED HEALTH CARE EDUCATION/TRAINING PROGRAM

## 2020-11-11 PROCEDURE — 82607 VITAMIN B-12: CPT

## 2020-11-11 PROCEDURE — 84443 ASSAY THYROID STIM HORMONE: CPT

## 2020-11-11 PROCEDURE — 74011636637 HC RX REV CODE- 636/637: Performed by: STUDENT IN AN ORGANIZED HEALTH CARE EDUCATION/TRAINING PROGRAM

## 2020-11-11 PROCEDURE — 74011250637 HC RX REV CODE- 250/637: Performed by: HOSPITALIST

## 2020-11-11 RX ORDER — SERTRALINE HYDROCHLORIDE 50 MG/1
25 TABLET, FILM COATED ORAL DAILY
Status: DISCONTINUED | OUTPATIENT
Start: 2020-11-12 | End: 2020-11-13 | Stop reason: HOSPADM

## 2020-11-11 RX ORDER — ENOXAPARIN SODIUM 100 MG/ML
40 INJECTION SUBCUTANEOUS EVERY 24 HOURS
Status: DISCONTINUED | OUTPATIENT
Start: 2020-11-12 | End: 2020-11-13 | Stop reason: HOSPADM

## 2020-11-11 RX ORDER — RISPERIDONE 0.25 MG/1
0.5 TABLET, FILM COATED ORAL 2 TIMES DAILY
Status: DISCONTINUED | OUTPATIENT
Start: 2020-11-11 | End: 2020-11-13 | Stop reason: HOSPADM

## 2020-11-11 RX ADMIN — ROSUVASTATIN CALCIUM 10 MG: 10 TABLET, COATED ORAL at 09:42

## 2020-11-11 RX ADMIN — RISPERIDONE 0.5 MG: 0.25 TABLET ORAL at 18:11

## 2020-11-11 RX ADMIN — ENOXAPARIN SODIUM 40 MG: 40 INJECTION SUBCUTANEOUS at 09:42

## 2020-11-11 RX ADMIN — INSULIN LISPRO 2 UNITS: 100 INJECTION, SOLUTION INTRAVENOUS; SUBCUTANEOUS at 18:11

## 2020-11-11 RX ADMIN — LOSARTAN POTASSIUM 100 MG: 100 TABLET, FILM COATED ORAL at 09:42

## 2020-11-11 RX ADMIN — Medication 10 ML: at 22:00

## 2020-11-11 RX ADMIN — INSULIN LISPRO 1 UNITS: 100 INJECTION, SOLUTION INTRAVENOUS; SUBCUTANEOUS at 22:09

## 2020-11-11 RX ADMIN — Medication 10 ML: at 22:10

## 2020-11-11 RX ADMIN — INSULIN LISPRO 3 UNITS: 100 INJECTION, SOLUTION INTRAVENOUS; SUBCUTANEOUS at 13:14

## 2020-11-11 RX ADMIN — METOPROLOL SUCCINATE 50 MG: 50 TABLET, EXTENDED RELEASE ORAL at 09:42

## 2020-11-11 RX ADMIN — TRAMADOL HYDROCHLORIDE 50 MG: 50 TABLET, COATED ORAL at 14:56

## 2020-11-11 RX ADMIN — INSULIN GLARGINE 21 UNITS: 100 INJECTION, SOLUTION SUBCUTANEOUS at 09:41

## 2020-11-11 RX ADMIN — Medication 10 ML: at 22:11

## 2020-11-11 NOTE — PROGRESS NOTES
Oncology End of Shift Note      Bedside shift change report given to Cortez John RN (incoming nurse) by Yulisa Cortez (outgoing nurse) on Amy Broderick. Report included the following information SBAR, Kardex, Intake/Output, MAR, Accordion and Recent Results. Shift Summary: received pt from ED, denied pain, patient was complaining \"there is a man in my room and he is doing something to me\"; pt able to answer orientation questions but is reporting the man in her room when there isnt one and complaining of hair in her food so she refused dinner; Ensures ordered d/t daughter stating she has lost 20 lbs due to paranoia causing the patient to not eat; started completing admission database but daughter had to take a phone call and then left - notified night nurse to pass on for tomorrow since she said she would be back; PRN ativan given prior to MRI for claustrophobia; Psych and Ortho consult called      Issues for Physician to Address:       Patient on Cardiac Monitoring?     [] Yes  [] No    Rhythm:      Ricky Scale:     Ricky Score: 18        If Ricky Scale less than 15   Patient Mobility Ordered  No  Speciality Bed Ordered   No     Boots Applied                  No      Shift Events        Yulisa Cortez

## 2020-11-11 NOTE — PROGRESS NOTES
[employer] Monitor initiated on 11/10/2020 at 1900 for the following reason(s): patient safety, fall prevention . Patient educated on use of camera and is in agreement.     If patient unable to verbalize understanding of camera necessity, the responsible party notified and educated:  Daughter verbally agreed while visiting with patient

## 2020-11-11 NOTE — PROGRESS NOTES
Received notification from bedside RN about patient with regards to: recent BP elevation with no PRN meds  VS: /75, HR 73, RR 18, O2 sat 99% on RA, temp 98.5    Intervention given: Labetalol 10 mg prn to keep SBP<165 mmhg

## 2020-11-11 NOTE — PROGRESS NOTES
Problem: Falls - Risk of  Goal: *Absence of Falls  Description: Document Joaquin Click Fall Risk and appropriate interventions in the flowsheet. Outcome: Progressing Towards Goal  Note: Fall Risk Interventions:            Medication Interventions: Patient to call before getting OOB, Teach patient to arise slowly         History of Falls Interventions: Bed/chair exit alarm         Problem: Pressure Injury - Risk of  Goal: *Prevention of pressure injury  Description: Document Ricky Scale and appropriate interventions in the flowsheet.   Outcome: Progressing Towards Goal  Note: Pressure Injury Interventions:       Moisture Interventions: Absorbent underpads, Check for incontinence Q2 hours and as needed, Minimize layers    Activity Interventions: Pressure redistribution bed/mattress(bed type), Increase time out of bed, PT/OT evaluation    Mobility Interventions: HOB 30 degrees or less, Pressure redistribution bed/mattress (bed type)    Nutrition Interventions: Document food/fluid/supplement intake

## 2020-11-11 NOTE — PROGRESS NOTES
Problem: Falls - Risk of  Goal: *Absence of Falls  Description: Document Chantale Sona Fall Risk and appropriate interventions in the flowsheet. Outcome: Progressing Towards Goal  Note: Fall Risk Interventions:            Medication Interventions: Patient to call before getting OOB         History of Falls Interventions: Bed/chair exit alarm, Door open when patient unattended         Problem: Pressure Injury - Risk of  Goal: *Prevention of pressure injury  Description: Document Ricky Scale and appropriate interventions in the flowsheet.   Outcome: Progressing Towards Goal  Note: Pressure Injury Interventions:       Moisture Interventions: Absorbent underpads, Apply protective barrier, creams and emollients    Activity Interventions: Increase time out of bed, Pressure redistribution bed/mattress(bed type)    Mobility Interventions: HOB 30 degrees or less, Float heels    Nutrition Interventions: Document food/fluid/supplement intake

## 2020-11-11 NOTE — PROGRESS NOTES
Comprehensive Nutrition Assessment    Type and Reason for Visit: Initial, Positive nutrition screen    Nutrition Recommendations/Plan:   Continue consistent carb diet  RD will switch ONS from Ensure to Glucerna for better BG control  Please document % meals and supplements consumed in flowsheet I/O's under intake     Nutrition Assessment:      11/11: MST referral received for wt loss and decreased intake. Pt noted for acute confusion, HTN, DM2, and HLD. Consistent carb diet ordered + Ensure enlive TID. Per chart, pt has been experiencing progressive hallucinations x5 weeks. Pt reports a good appetite today, she ate most of breakfast. She admits decreased intake for about a month because she is afraid someone has been putting stuff in her food. She has still been getting at least 1 meal/day throughout this time. Pt reports seeing someone else in the room during our visit (hallucination). RD ensured pt her food would be safe here. Pt was pleasant with RD during visit, and able to answer questions appropriately. No recent BM which pt attributes to little food intake. She usually goes 2x/day. Wt loss noted d/t decreased intake of 6% since July and 10% since Jan. NFPE performed, no significant wasting identified, pt does not feel she has lost strength. Estimated Daily Nutrient Needs:  Energy (kcal): 1474 kcal (BMR x 1. 3AF - 500); Weight Used for Energy Requirements: Current  Protein (g): 50-60g (1.0-1.2g/kg IBW); Weight Used for Protein Requirements: Ideal  Fluid (ml/day): 1500mL; Method Used for Fluid Requirements: 1 ml/kcal      Nutrition Related Findings:  Labs: A1c 7.4 (11/10) -258mg/dL. Meds: lovenox, lantus, humalog, maxzide. Wounds:    None       Current Nutrition Therapies:  DIET DIABETIC CONSISTENT CARB Regular  DIET NUTRITIONAL SUPPLEMENTS All Meals;  Ensure Enlive    Anthropometric Measures:  · Height:  5' 2\" (157.5 cm)  · Current Body Wt:  105 kg (231 lb 7.7 oz)   · BMI Category:  Obese class 3 (BMI 40.0 or greater)       Nutrition Diagnosis:   · Inadequate protein-energy intake related to psychological cause or life stress as evidenced by poor intake prior to admission(6% wt loss since July; 10% wt loss since Jan)      Nutrition Interventions:   Food and/or Nutrient Delivery: Continue current diet, Modify oral nutrition supplement  Nutrition Education and Counseling: No recommendations at this time  Coordination of Nutrition Care: Continue to monitor while inpatient    Goals:  PO intake >50% meals and supplements next 5-7 days       Nutrition Monitoring and Evaluation:   Behavioral-Environmental Outcomes: Beliefs and attitudes  Food/Nutrient Intake Outcomes: Food and nutrient intake, Supplement intake  Physical Signs/Symptoms Outcomes: Biochemical data, Weight, Meal time behavior, GI status    Discharge Planning:    Continue current diet     Electronically signed by Rachele Rhodes RD on 11/11/2020 at 11:10 AM    Contact: [phone]  Pager [phone]

## 2020-11-11 NOTE — PROGRESS NOTES
Oncology End of Shift Note      Bedside shift change report given to CORDELIA Fulton (incoming nurse) by Deshawn Montenegro (outgoing nurse) on [facility]. Report included the following information SBAR, Kardex, Intake/Output, MAR, Accordion and Recent Results. Shift Summary: PT remained stable throughout shift. Scheduled meds given, prn pain meds given. Minimal hallucinations during shift. Pt x1 assist to restroom. No BM. Issues for Physician to Address:       Patient on Cardiac Monitoring?     [] Yes  [] No    Rhythm:      Ricky Scale:     Ricky Score: [address] [facility],[address]

## 2020-11-11 NOTE — PROCEDURES
[facility]  EEG    Name:  Kaitlynn Gomez  MR#:  [id number]  :  1950  ACCOUNT #:  [de-identified]  DATE OF SERVICE:  11/10/2020    CLINICAL INDICATION:  The patient is a 50-year-old female with a history of altered mental status, sudden hallucinations, sudden altered mental status and sudden encephalopathy. EEG to rule out seizures, rule out cortical abnormality, rule out nonconvulsive or partial complex seizures. EEG CLASSIFICATION:  On this patient is normal, awake and drowsy. DESCRIPTION OF THE RECORD:  The background of this recording contains a posteriorly located occipital alpha rhythm of 9-10 Hz that does attenuate some with eye opening. Throughout the recording, there was no clear areas of focal slowing or spike and spike-and-wave discharges seen. The patient did have a slight increase in beta activity at times seen in the central head regions. The patient did have states of sleep with K complexes and sleep spindles seen in the central head regions. Photic stimulation produced a little change in the background recording. Hyperventilation was not performed. INTERPRETATION:  This is a normal electroencephalogram with the patient awake and asleep showing no clear focal abnormalities, no spike discharges, no recorded spells of any type. Clinical correlation recommended. Christo Mcdonald MD      [id number]  D:  11/10/2020 20:48  T:  2020 2:37  JOB #:  [id number]  CC:   Max Cullen MD
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
## 2020-11-11 NOTE — PROGRESS NOTES
Consult  REFERRED BY:  Ruslan Gilbert MD    CHIEF COMPLAINT: Hallucinations      Subjective:     Lindsey Lynn is a 69 y.o. right-handed -American female seen as a new patient to me, at the request of the hospitalist for evaluation of new problem of about a 5-week history of progressive hallucinations, with the patient seeing people who are not there, and they are talking to her and throwing things at her, and it seems like she knows some of them but does not know the rest of them but cannot tell me who they are. She is actually seen one in the room right now. She does not seem to agitated, and does not seem to have any major alteration in level of consciousness. She denies any focal weakness, sensory loss, cranial nerve problems, fever, headache, meningismus, or any other precipitating cause for this, denies any new medication, toxin exposure, as a cause of this. I did mention that stress commonly can cause these problems and she says she been under a lot of stress, but does not expound on what it might be. She has hypertension and diabetes, and hyperlipidemia, but does not seem to have any other major medical problems. She never had a history of stroke or TIA, and apparently has never had a history of psychiatric problems before. She is on no medication that normally can cause confusion. She did have a fall the day prior to admission apparently and hit her head and right hand, still has a fairly bruised hand is very tender. X-ray showed no fracture. Patient is better, and hallucinations are improved not so bad. Her EEG was normal and psychiatry will see her soon.   No change in her neurological exam.    Past Medical History:   Diagnosis Date    Chronic osteoarthritis 7/17/2020    Diabetes ([address]     Elevated glucose 7/17/2020    Essential (primary) hypertension 7/17/2020    Menopause     Mixed hyperlipidemia 7/17/2020      Past Surgical History:   Procedure Laterality Date    HX BREAST BIOPSY Left 11/2019    HX GYN       Family History   Problem Relation Age of Onset    Heart Disease Mother     Heart Disease Brother       Social History     Tobacco Use    Smoking status: Current Every Day Smoker     Packs/day: 0.25    Smokeless tobacco: Never Used   Substance Use Topics    Alcohol use: Not Currently     Comment: Occasionally         Current Facility-Administered Medications:     [START ON 11/12/2020] enoxaparin (LOVENOX) injection 40 mg, 40 mg, SubCUTAneous, Q24H, Nery Clarke F, NP    sodium chloride (NS) flush 5-40 mL, 5-40 mL, IntraVENous, Q8H, Joe Kate MD, 10 mL at 11/10/20 2326    sodium chloride (NS) flush 5-40 mL, 5-40 mL, IntraVENous, PRN, Joe Kate MD    acetaminophen (TYLENOL) tablet 650 mg, 650 mg, Oral, Q6H PRN, 650 mg at 11/10/20 0602 **OR** acetaminophen (TYLENOL) suppository 650 mg, 650 mg, Rectal, Q6H PRN, Joe Kate MD    polyethylene glycol (MIRALAX) packet 17 g, 17 g, Oral, DAILY PRN, Joe Kate MD    losartan (COZAAR) tablet 100 mg, 100 mg, Oral, DAILY, Joe Kate MD, 100 mg at 11/11/20 0942    metoprolol succinate (TOPROL-XL) XL tablet 50 mg, 50 mg, Oral, DAILY, Joe Kate MD, 50 mg at 11/11/20 0761    rosuvastatin (CRESTOR) tablet 10 mg, 10 mg, Oral, DAILY, Joe Kate MD, 10 mg at 11/11/20 0942    triamterene-hydroCHLOROthiazide (MAXZIDE) 37.5-25 mg per tablet 1 Tab, 1 Tab, Oral, DAILY, Joe Kate MD, 1 Tab at 11/10/20 1051    glucose chewable tablet 16 g, 4 Tab, Oral, PRN, Joe Kate MD    dextrose (D50W) injection syrg 12.5-25 g, 25-50 mL, IntraVENous, PRN, Joe Kate MD    glucagon (GLUCAGEN) injection 1 mg, 1 mg, IntraMUSCular, PRN, Joe Kate MD    insulin glargine (LANTUS) injection 21 Units, 0.2 Units/kg, SubCUTAneous, DAILY, Joe Kate MD, 21 Units at 11/11/20 0941    insulin lispro (HUMALOG) injection, , SubCUTAneous, AC&HS, Alcharif, Ladean Boeck, MD, 3 Units at 11/11/20 1314    LORazepam (ATIVAN) injection 1-2 mg, 1-2 mg, IntraVENous, Q6H PRN, Tej Blanca MD, 1 mg at 11/10/20 1835    traMADoL (ULTRAM) tablet 50 mg, 50 mg, Oral, Q6H PRN, Alfonzo, Garrett COSTA MD, 50 mg at 11/10/20 2324    labetaloL (NORMODYNE;TRANDATE) injection 10 mg, 10 mg, IntraVENous, Q4H PRN, Ketty Thomas NP    sodium chloride (NS) flush 5-40 mL, 5-40 mL, IntraVENous, Q8H, Radha Wahl MD, 10 mL at 11/10/20 2326    sodium chloride (NS) flush 5-40 mL, 5-40 mL, IntraVENous, PRN, Radha Wahl MD        No Known Allergies   MRI Results (most recent):  Results from [place] encounter on 11/09/20   MRI CERV SPINE WO CONT    Narrative EXAM: MRI CERV SPINE WO CONT  Clinical history: Cervical myelopathy  INDICATION: myelopathy. COMPARISON: CT C-spine performed without  2020    TECHNIQUE: MR imaging of the cervical spine was performed using the following  sequences: sagittal T1, T2, STIR;  axial T2, T1.     CONTRAST:  None. FINDINGS:    There is normal alignment of the cervical spine. Vertebral body heights are  maintained. Marrow signal is normal.    The craniocervical junction is intact. The course, caliber, and signal intensity  of the spinal cord are normal.      The paraspinal soft tissues are within normal limits. C2-C3: Mild facet arthropathy. Mild right paracentral protrusion. Mild canal  stenosis. Foramina are patent. C3-C4: Mild to moderate facet arthropathy. Disc bulge/osteophyte. Mild canal  stenosis. Foramina are patent. C4-C5: Mild right paracentral protrusion/osteophyte. Mild facet arthropathy. Canal is patent. Moderate left foraminal stenosis. C5-C6: Bulge/osteophyte. Disc desiccation. Mild canal stenosis. Foramina are  patent. C6-C7: Disc desiccation. Disc bulge/osteophyte. Canal is patent. Foramina are  patent     C7-T1: Disc bulge/osteophyte. Canal and foramina are patent.       Impression IMPRESSION:  Multilevel disc and facet degenerative change. Moderate left foraminal stenosis at C4-5. Mild central canal stenosis at C2-3, C3-4 and C5-6. Results from [place] encounter on 11/09/20   MRI CERV SPINE WO CONT    Narrative EXAM: MRI CERV SPINE WO CONT  Clinical history: Cervical myelopathy  INDICATION: myelopathy. COMPARISON: CT C-spine performed without  2020    TECHNIQUE: MR imaging of the cervical spine was performed using the following  sequences: sagittal T1, T2, STIR;  axial T2, T1.     CONTRAST:  None. FINDINGS:    There is normal alignment of the cervical spine. Vertebral body heights are  maintained. Marrow signal is normal.    The craniocervical junction is intact. The course, caliber, and signal intensity  of the spinal cord are normal.      The paraspinal soft tissues are within normal limits. C2-C3: Mild facet arthropathy. Mild right paracentral protrusion. Mild canal  stenosis. Foramina are patent. C3-C4: Mild to moderate facet arthropathy. Disc bulge/osteophyte. Mild canal  stenosis. Foramina are patent. C4-C5: Mild right paracentral protrusion/osteophyte. Mild facet arthropathy. Canal is patent. Moderate left foraminal stenosis. C5-C6: Bulge/osteophyte. Disc desiccation. Mild canal stenosis. Foramina are  patent. C6-C7: Disc desiccation. Disc bulge/osteophyte. Canal is patent. Foramina are  patent     C7-T1: Disc bulge/osteophyte. Canal and foramina are patent. Impression IMPRESSION:  Multilevel disc and facet degenerative change. Moderate left foraminal stenosis at C4-5. Mild central canal stenosis at C2-3, C3-4 and C5-6.      Review of Systems:  A comprehensive review of systems was negative except for: Constitutional: positive for fatigue and malaise  Musculoskeletal: positive for myalgias, arthralgias, stiff joints and Right hand pain  Neurological: positive for Hallucinations  Behvioral/Psych: positive for Hallucinations, anxiety, behavior problems and depression   Vitals:    11/10/20 1455 11/10/20 1945 11/10/20 2300 11/11/20 0803   BP: (!) 113/95 (!) 181/75 (!) 156/74 (!) 151/82   Pulse: 69 73 72 66   Resp: 18 18 18 18   Temp: 98.6 °F (37 °C) 98.5 °F (36.9 °C) 98.4 °F (36.9 °C) 98.3 °F (36.8 °C)   SpO2: 99% 99% 96% 99%   Weight:       Height:         Objective:     I      NEUROLOGICAL EXAM:    Appearance: The patient is well developed, well nourished, provides a fair history and is in no acute distress. Mental Status: Oriented to time, place and person, and the president, cognitive function is relatively normal except for the hallucinations and speech is fluent and no aphasia or dysarthria. Mood and affect appropriate. Cranial Nerves:   Intact visual fields. Fundi are benign, disc are flat, no lesions seen on funduscopy. DAJUAN, EOM's full, no nystagmus, no ptosis. Facial sensation is normal. Corneal reflexes are not tested. Facial movement is symmetric. Hearing is normal bilaterally. Palate is midline with normal sternocleidomastoid and trapezius muscles are normal. Tongue is midline. Neck without meningismus or bruits  Temporal arteries are not tender or enlarged  TMJ areas are not tender on palpation   Motor:  4/5 strength in upper and lower proximal and distal muscles, but she refuses to use the right hand much. . Normal bulk and tone. No fasciculations. Rapid alternating movement is symmetric and slow bilaterally   Reflexes:   Deep tendon reflexes 1+/4 and symmetrical with absent ankle jerks. No babinski or clonus present   Sensory:   Normal to touch, pinprick and vibration and temperature. DSS is intact   Gait:  Not tested   Tremor:   No tremor noted. Cerebellar:  Not tested cerebellar signs present on Romberg and tandem testing and finger-nose-finger exam is unremarkable. Neurovascular:  Normal heart sounds and regular rhythm, peripheral pulses decreased, and no carotid bruits.            Assessment: [unfilled]  Active Problems:    Acute confusion (11/10/2020)      Hallucinations (11/10/2020)      Acute alteration in mental status (11/10/2020)      Complex partial seizures with impaired consciousness at onset [facility]) (11/10/2020)      Bilateral carotid artery stenosis (11/10/2020)      Cerebral microvascular disease (11/10/2020)      Psychosis ([address] (11/10/2020)        Plan:     Patient most likely having acute psychotic break, from some ill-defined stress or involutional psychosis, and has no focal neurological symptoms or signs other than hallucinations. She had a normal CT of the head and an MRI scan is normal also of the brain, and her MRA of the brain is also normal, and the MRI of the cervical spine does not show any major cord compression or stenosis. Her EEG is normal.  Her carotid Dopplers are also normal  Psychiatry to see the patient, this is clearly a psychiatric problem, we will see again as needed.     Signed By: Syd Tan MD     November 11, 2020       CC: Joselito Summers MD  FAX: [phone]
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
## 2020-11-12 LAB
ALBUMIN SERPL-MCNC: 2.8 G/DL (ref 3.5–5)
ALBUMIN/GLOB SERPL: 0.7 {RATIO} (ref 1.1–2.2)
ALP SERPL-CCNC: 71 U/L (ref 45–117)
ALT SERPL-CCNC: 23 U/L (ref 12–78)
ANION GAP SERPL CALC-SCNC: 8 MMOL/L (ref 5–15)
AST SERPL-CCNC: 11 U/L (ref 15–37)
BILIRUB SERPL-MCNC: 0.5 MG/DL (ref 0.2–1)
BUN SERPL-MCNC: 21 MG/DL (ref 6–20)
BUN/CREAT SERPL: 22 (ref 12–20)
CALCIUM SERPL-MCNC: 9 MG/DL (ref 8.5–10.1)
CHLORIDE SERPL-SCNC: 104 MMOL/L (ref 97–108)
CO2 SERPL-SCNC: 27 MMOL/L (ref 21–32)
CREAT SERPL-MCNC: 0.94 MG/DL (ref 0.55–1.02)
ERYTHROCYTE [DISTWIDTH] IN BLOOD BY AUTOMATED COUNT: 12.8 % (ref 11.5–14.5)
GLOBULIN SER CALC-MCNC: 3.8 G/DL (ref 2–4)
GLUCOSE BLD STRIP.AUTO-MCNC: 151 MG/DL (ref 65–100)
GLUCOSE BLD STRIP.AUTO-MCNC: 163 MG/DL (ref 65–100)
GLUCOSE BLD STRIP.AUTO-MCNC: 215 MG/DL (ref 65–100)
GLUCOSE BLD STRIP.AUTO-MCNC: 235 MG/DL (ref 65–100)
GLUCOSE SERPL-MCNC: 178 MG/DL (ref 65–100)
HCT VFR BLD AUTO: 33.5 % (ref 35–47)
HGB BLD-MCNC: 11 G/DL (ref 11.5–16)
MCH RBC QN AUTO: 32.3 PG (ref 26–34)
MCHC RBC AUTO-ENTMCNC: 32.8 G/DL (ref 30–36.5)
MCV RBC AUTO: 98.2 FL (ref 80–99)
NRBC # BLD: 0 K/UL (ref 0–0.01)
NRBC BLD-RTO: 0 PER 100 WBC
PLATELET # BLD AUTO: 131 K/UL (ref 150–400)
PMV BLD AUTO: 11.5 FL (ref 8.9–12.9)
POTASSIUM SERPL-SCNC: 3.9 MMOL/L (ref 3.5–5.1)
PROT SERPL-MCNC: 6.6 G/DL (ref 6.4–8.2)
RBC # BLD AUTO: 3.41 M/UL (ref 3.8–5.2)
SERVICE CMNT-IMP: ABNORMAL
SODIUM SERPL-SCNC: 139 MMOL/L (ref 136–145)
T PALLIDUM AB SER QL IF: ABNORMAL
WBC # BLD AUTO: 6.8 K/UL (ref 3.6–11)

## 2020-11-12 PROCEDURE — 74011250637 HC RX REV CODE- 250/637: Performed by: NURSE PRACTITIONER

## 2020-11-12 PROCEDURE — 82962 GLUCOSE BLOOD TEST: CPT

## 2020-11-12 PROCEDURE — 74011636637 HC RX REV CODE- 636/637: Performed by: STUDENT IN AN ORGANIZED HEALTH CARE EDUCATION/TRAINING PROGRAM

## 2020-11-12 PROCEDURE — 97116 GAIT TRAINING THERAPY: CPT

## 2020-11-12 PROCEDURE — 97165 OT EVAL LOW COMPLEX 30 MIN: CPT | Performed by: OCCUPATIONAL THERAPIST

## 2020-11-12 PROCEDURE — 80053 COMPREHEN METABOLIC PANEL: CPT

## 2020-11-12 PROCEDURE — 97161 PT EVAL LOW COMPLEX 20 MIN: CPT

## 2020-11-12 PROCEDURE — 74011250637 HC RX REV CODE- 250/637: Performed by: STUDENT IN AN ORGANIZED HEALTH CARE EDUCATION/TRAINING PROGRAM

## 2020-11-12 PROCEDURE — 97535 SELF CARE MNGMENT TRAINING: CPT | Performed by: OCCUPATIONAL THERAPIST

## 2020-11-12 PROCEDURE — 36415 COLL VENOUS BLD VENIPUNCTURE: CPT

## 2020-11-12 PROCEDURE — 74011250636 HC RX REV CODE- 250/636: Performed by: NURSE PRACTITIONER

## 2020-11-12 PROCEDURE — 65270000015 HC RM PRIVATE ONCOLOGY

## 2020-11-12 PROCEDURE — 74011250636 HC RX REV CODE- 250/636: Performed by: PSYCHIATRY & NEUROLOGY

## 2020-11-12 PROCEDURE — 97530 THERAPEUTIC ACTIVITIES: CPT

## 2020-11-12 PROCEDURE — 74011250637 HC RX REV CODE- 250/637: Performed by: HOSPITALIST

## 2020-11-12 PROCEDURE — 85027 COMPLETE CBC AUTOMATED: CPT

## 2020-11-12 RX ORDER — LEVOFLOXACIN 500 MG/1
500 TABLET, FILM COATED ORAL EVERY 24 HOURS
Status: DISCONTINUED | OUTPATIENT
Start: 2020-11-12 | End: 2020-11-13 | Stop reason: HOSPADM

## 2020-11-12 RX ORDER — CIPROFLOXACIN 500 MG/1
500 TABLET ORAL EVERY 24 HOURS
Status: DISCONTINUED | OUTPATIENT
Start: 2020-11-12 | End: 2020-11-12 | Stop reason: CLARIF

## 2020-11-12 RX ADMIN — TRAMADOL HYDROCHLORIDE 50 MG: 50 TABLET, COATED ORAL at 00:27

## 2020-11-12 RX ADMIN — LEVOFLOXACIN 500 MG: 500 TABLET, FILM COATED ORAL at 08:41

## 2020-11-12 RX ADMIN — Medication 10 ML: at 06:29

## 2020-11-12 RX ADMIN — LOSARTAN POTASSIUM 100 MG: 100 TABLET, FILM COATED ORAL at 08:41

## 2020-11-12 RX ADMIN — LORAZEPAM 2 MG: 2 INJECTION INTRAMUSCULAR; INTRAVENOUS at 01:56

## 2020-11-12 RX ADMIN — ENOXAPARIN SODIUM 40 MG: 40 INJECTION SUBCUTANEOUS at 08:42

## 2020-11-12 RX ADMIN — TRIAMTERENE AND HYDROCHLOROTHIAZIDE 1 TABLET: 37.5; 25 TABLET ORAL at 09:46

## 2020-11-12 RX ADMIN — Medication 10 ML: at 21:19

## 2020-11-12 RX ADMIN — INSULIN GLARGINE 21 UNITS: 100 INJECTION, SOLUTION SUBCUTANEOUS at 08:43

## 2020-11-12 RX ADMIN — ROSUVASTATIN CALCIUM 10 MG: 10 TABLET, COATED ORAL at 08:41

## 2020-11-12 RX ADMIN — RISPERIDONE 0.5 MG: 0.25 TABLET ORAL at 08:41

## 2020-11-12 RX ADMIN — Medication 10 ML: at 13:55

## 2020-11-12 RX ADMIN — SERTRALINE HYDROCHLORIDE 25 MG: 50 TABLET ORAL at 08:41

## 2020-11-12 RX ADMIN — ACETAMINOPHEN 650 MG: 325 TABLET ORAL at 21:18

## 2020-11-12 RX ADMIN — RISPERIDONE 0.5 MG: 0.25 TABLET ORAL at 17:37

## 2020-11-12 RX ADMIN — METOPROLOL SUCCINATE 50 MG: 50 TABLET, EXTENDED RELEASE ORAL at 08:41

## 2020-11-12 RX ADMIN — INSULIN LISPRO 2 UNITS: 100 INJECTION, SOLUTION INTRAVENOUS; SUBCUTANEOUS at 08:44

## 2020-11-12 RX ADMIN — INSULIN LISPRO 2 UNITS: 100 INJECTION, SOLUTION INTRAVENOUS; SUBCUTANEOUS at 12:22

## 2020-11-12 NOTE — PROGRESS NOTES
Problem: Self Care Deficits Care Plan (Adult)  Goal: *Acute Goals and Plan of Care (Insert Text)  Description:   FUNCTIONAL STATUS PRIOR TO ADMISSION: At baseline, pt lived alone and was indep in adls and IADLs including driving, shopping, preparing meals, housekeeping. Recently, pt had been having hallucinations and daughter brought pt to her home where per medical record, family members were available to assist .  Pt had a fall on the stairs, and was brought to ED    HOME SUPPORT: The patient lived alone with no local support. Recently The patient was  living  with her daughter. Occupational Therapy Goals  Initiated 11/12/2020  1. Patient will perform grooming with supervision/set-up, using one handed techniques, within 7 day(s). 2.  Patient will perform bathing with minimal assistance/contact guard assist within 7 day(s). 3.  Patient will perform lower body dressing with supervision/set-up, using appropriate one hand techniques,  within 7 day(s). 4.  Patient will perform toilet transfers with supervision/set-up within 7 day(s). 5.  Patient will perform all aspects of toileting with supervision/set-up within 7 day(s). Outcome: Not Met   OCCUPATIONAL THERAPY EVALUATION  Patient: Alonso De La Torre (2 y.o. female)  Date: 11/12/2020  Primary Diagnosis: Acute confusion [R41.0]        Precautions: fall, non WB R wrist, R forearm splint       ASSESSMENT  Based on the objective data described below, the patient presents with drowsiness, flat affect, R side pain due to fall, probable R wrist fracture that is immobilized, R finger/hand edema, impaired balance. Mild generalized weakness, and decreased endurance impairing independence and safety during adls and functional mobility. Pt is functioning below her independent baseline and will benefit from acute OT services. If pt is discharged to her daughter's home (multiple steps) she will need 24 hour assistance-assist for adls, IADLs and mobility. .    Current Level of Function Impacting Discharge (ADLs/self-care): generally minimal assistance--direct 1:1 support for mobility and adls    Functional Outcome Measure: The patient scored Total: 50/100 on the Barthel Index outcome measure which is indicative of 50% impaired ability to care for basic self needs/dependency on others; inferred dependency on others for instrumental ADLs. Other factors to consider for discharge: may need BSC at daughters home and 24 hour assistance     Patient will benefit from skilled therapy intervention to address the above noted impairments. PLAN :  Recommendations and Planned Interventions: self care training, functional mobility training, therapeutic exercise, balance training, therapeutic activities, endurance activities, patient education, home safety training, and family training/education    Frequency/Duration: Patient will be followed by occupational therapy 4 times a week to address goals. Recommendation for discharge: (in order for the patient to meet his/her long term goals)  To be determined: If home to daughter's then [facility] OT services and 24 hour assistance. If 24 hour assist cannot be provided, then is appropriate for SNF rehab  This discharge recommendation:  Has been made in collaboration with the attending provider and/or case management    IF patient discharges home will need the following DME: possibly bedside commode, bed rail on bed        SUBJECTIVE:   Patient stated I'm tired.     OBJECTIVE DATA SUMMARY:   HISTORY:   Past Medical History:   Diagnosis Date    Chronic osteoarthritis 7/17/2020    Diabetes ([address]     Elevated glucose 7/17/2020    Essential (primary) hypertension 7/17/2020    Menopause     Mixed hyperlipidemia 7/17/2020     Past Surgical History:   Procedure Laterality Date    HX BREAST BIOPSY Left 11/2019    HX GYN         Expanded or extensive additional review of patient history: per medical record, pt has hx dementia (?) and rapid decline over the past month including hallucinations    Home Situation  Home Environment: Private residence  # Steps to Enter: 4  Rails to Enter: Yes  Hand Rails : Bilateral  One/Two Story Residence: Two story  Living Alone: Yes  Support Systems: Child(elidia)  Patient Expects to be Discharged to[de-identified] Private residence  Current DME Used/Available at Home: None  Tub or Shower Type: Tub/Shower combination    Hand dominance: Left    EXAMINATION OF PERFORMANCE DEFICITS:  Cognitive/Behavioral Status:  Neurologic State: Alert  Orientation Level: Oriented to person;Oriented to place;Oriented to situation     Perception: Appears intact  Perseveration: No perseveration noted  Safety/Judgement: Decreased awareness of environment;Decreased awareness of need for safety; Fall prevention    Skin: generally intact, but c/o pain on R side due to fall--testing was negative    Edema: R fingers    Hearing: Auditory  Auditory Impairment: None    Vision/Perceptual:    Tracking: (not tested)                      Acuity: (pt reports has glasses)         Range of Motion:  BUEs:  within functional limits except for RUE at shoulder, elbow, and fingers. R wrist splint intact. Strength:  Generalized weakness  LUE is functional  RUE is impaired post fall                    Coordination:     Fine Motor Skills-Upper: Left Intact; Right Impaired    Gross Motor Skills-Upper: Left Intact; Right Impaired    Tone & Sensation: Tone intact  Sensation intact                            Balance:  Sitting: Impaired  Sitting - Static: Fair (occasional)  Sitting - Dynamic: Not tested  Standing: Impaired  Standing - Static: Constant support;Good  Standing - Dynamic : Constant support; Fair    Functional Mobility and Transfers for ADLs:  Bed Mobility:  Supine to Sit: Supervision;Stand-by assistance; Additional time  Sit to Supine: Moderate assistance  Scooting: Minimum assistance; Additional time    Transfers:  Sit to Stand: Minimum assistance  Stand to Sit: Minimum assistance  Bathroom Mobility: Minimum assistance  Toilet Transfer : Minimum assistance  Assistive Device : (HHA pt was reaching for objects for support.)    ADL Assessment:  Feeding: Independent    Oral Facial Hygiene/Grooming: Moderate assistance    Bathing: Moderate assistance    Upper Body Dressing: Moderate assistance    Lower Body Dressing: Maximum assistance    Toileting: Minimum assistance(to manage clothing)                ADL Intervention and task modifications:    Pt was drowsy, but able to sit upright at the EOB requiring signficant additional time and effort to perform. Multiple cues to keep eyes open. Pt c/o pain along her R side--head/neck/shoulder/elbow/ back. Required assist to ambulate into the bathroom and perform toilet transfer requiring assist for managing tissue and clothing. Additional time for all activities needed. Pt demonstrated unsteadiness and Loss of balance. Educated on protecting RUE during ambulation as she was unaware of her body position in space in the doorway, requiring physical assist to protect UE. No hallucinations verbalized during tx session. Cognitive Retraining  Safety/Judgement: Decreased awareness of environment;Decreased awareness of need for safety; Fall prevention      Functional Measure:  Barthel Index:    Bathin  Bladder: 10  Bowels: 10  Groomin  Dressin  Feeding: 10  Mobility: 0  Stairs: 0  Toilet Use: 5  Transfer (Bed to Chair and Back): 10  Total: 50/100        The Barthel ADL Index: Guidelines  1. The index should be used as a record of what a patient does, not as a record of what a patient could do. 2. The main aim is to establish degree of independence from any help, physical or verbal, however minor and for whatever reason. 3. The need for supervision renders the patient not independent.   4. A patient's performance should be established using the best available evidence. Asking the patient, friends/relatives and nurses are the usual sources, but direct observation and common sense are also important. However direct testing is not needed. 5. Usually the patient's performance over the preceding 24-48 hours is important, but occasionally longer periods will be relevant. 6. Middle categories imply that the patient supplies over 50 per cent of the effort. 7. Use of aids to be independent is allowed. Nisha Nation., BarthelGARTH. (5693). Functional evaluation: the Barthel Index. [address] [facility] (14)2. Kia Anders willie NNAMDI Mckeon, Esdras Brunner., Thang Huffman., Cipriano, [address] (1999). Measuring the change indisability after inpatient rehabilitation; comparison of the responsiveness of the Barthel Index and Functional [place] Measure. Journal of Neurology, Neurosurgery, and Psychiatry, 66(4), 683-562. Ross Lucero, CUAUHTEMOCJ.A, CHAU Fuentes, & Luna Biswas MHannaA. (2004.) Assessment of post-stroke quality of life in cost-effectiveness studies: The usefulness of the Barthel Index and the EuroQoL-5D. Quality of Life Research, 15, 821-35         Occupational Therapy Evaluation Charge Determination   History Examination Decision-Making   MEDIUM Complexity : Expanded review of history including physical, cognitive and psychosocial  history  MEDIUM Complexity : 3-5 performance deficits relating to physical, cognitive , or psychosocial skils that result in activity limitations and / or participation restrictions MEDIUM Complexity : Patient may present with comorbidities that affect occupational performnce.  Miniml to moderate modification of tasks or assistance (eg, physical or verbal ) with assesment(s) is necessary to enable patient to complete evaluation       Based on the above components, the patient evaluation is determined to be of the following complexity level: MEDIUM  Pain Rating:  Complained of pain along R side, but did not rate    Activity Tolerance:   Fair, Poor, requires rest breaks and BP stable, however, pt lethargic, drowsy, unsteady    After treatment patient left in no apparent distress:    Supine in bed-HOB raised, Call bell within reach, Bed / chair alarm activated and Side rails x 3    COMMUNICATION/EDUCATION:   The patients plan of care was discussed with: Physical therapist and Registered nurse. Patient is unable to participate in goal setting and plan of care. This patients plan of care is appropriate for delegation to [facility].     Thank you for this referral.  Titi Douglas, OTR/L   36 minutes

## 2020-11-12 NOTE — PROGRESS NOTES
Oncology End of Shift Note      Bedside shift change report given to Aide Alfaro RN (incoming nurse) by Martina Bullard (outgoing nurse) on [facility]. Report included the following information SBAR, Kardex, Intake/Output and MAR. Shift Summary: Pt remained stable throughout shift. Scheduled meds given, PRN meds given for pain in right arm, education provided. Hourly rounding completed, IV flushes well, pt up x1 assist to the bathroom, no bm today. Pt sat on the side of the bed for part of the shift, pt turns self. Issues for Physician to Address:      Patient on Cardiac Monitoring?     [] Yes  [x] No    Rhythm:     Ricky Scale:     Ricky Score: [address]

## 2020-11-12 NOTE — PROGRESS NOTES
Problem: Mobility Impaired (Adult and Pediatric)  Goal: *Acute Goals and Plan of Care (Insert Text)  Description: FUNCTIONAL STATUS PRIOR TO ADMISSION: Patient was living alone and indep until recently began having hallucinations and AMS and daughter took her to her home. Patient had a fall on the stairs at daughter's house  with injury to right wrist (now in splint)    [address]: Patient was indep with ADLs until recent AMS and psychiatric issues. Physical Therapy Goals  Initiated 11/12/2020  1. Patient will move from supine to sit and sit to supine  in bed with modified independence within 7 day(s). 2.  Patient will transfer from bed to chair and chair to bed with modified independence using the least restrictive device within 7 day(s). 3.  Patient will perform sit to stand with modified independence within 7 day(s). 4.  Patient will ambulate with supervision/set-up for 200 feet with the least restrictive device within 7 day(s). 5.  Patient will ascend/descend 12 stairs with  handrail(s) with supervision/set-up within 7 day(s). Outcome: Not Met   PHYSICAL THERAPY EVALUATION  Patient: Amy Mariee (87 y.o. female)  Date: 11/12/2020  Primary Diagnosis: Acute confusion [R41.0]        Precautions: falls      ASSESSMENT  Based on the objective data described below, the patient presents with drowsiness and decreased tolerance of activity, decreased safety awareness, slow processing and difficulty sequencing functional tasks, unsteady gait, balance deficits, right wrist pain with splint limiting movement, and general weakness. Patient received in bed and agreeable to participate, affect is flat and patient slow to answer questions. Patient required SBA for cuing and extra time to come to EOB, sitting balance is intact. Patient required min assist to stand and ambulated with min assist(handheld) to bathroom with unsteady gait.   Trunk sway is increased harper to right and patient with uneven step length. Patient used toilet, again required cues to sequence task and ambulated back to bed. Attempted to use RW for improved stability but patient not able to   with right UE. Patient had one LOB when turning to sit on bed with therapist to correct, patient did not demonstrate ability to self correct or awareness of near fall. Patient returned to supine with lunch tray and left with call bell in reach and bed alarm. Patient is at high risk for falls and will require HHPT with family's constant supervision and physical assistance when up, if family not able to provide then would recommend rehab in SNF. Current Level of Function Impacting Discharge (mobility/balance): min assist for transfers and ambulation, unsteady    Functional Outcome Measure: The patient scored 50/100 on the Barthel outcome measure which is indicative of moderate functional impairment      Other factors to consider for discharge: high falls risk, AMS, hallucinations, ?dementia     Patient will benefit from skilled therapy intervention to address the above noted impairments. PLAN :  Recommendations and Planned Interventions: bed mobility training, transfer training, gait training, therapeutic exercises, patient and family training/education, and therapeutic activities      Frequency/Duration: Patient will be followed by physical therapy:  4 times a week to address goals.     Recommendation for discharge: (in order for the patient to meet his/her long term goals)  Physical therapy at least 2 days/week in the home AND ensure assist and/or supervision for safety with mobility, or rehab in SNF if family not able to provide    This discharge recommendation:  Has been made in collaboration with the attending provider and/or case management    IF patient discharges home will need the following DME: to be determined (TBD)         SUBJECTIVE:   Patient stated I see the people but they go away when I get close.    OBJECTIVE DATA SUMMARY:   HISTORY:    Past Medical History:   Diagnosis Date    Chronic osteoarthritis 7/17/2020    Diabetes ([facility] [address]     Elevated glucose 7/17/2020    Essential (primary) hypertension 7/17/2020    Menopause     Mixed hyperlipidemia 7/17/2020     Past Surgical History:   Procedure Laterality Date    HX BREAST BIOPSY Left 11/2019    HX GYN         Personal factors and/or comorbidities impacting plan of care: psych issues    Home Situation  Home Environment: Private residence  # Steps to Enter: 4  Rails to Enter: Yes  Hand Rails : Bilateral  One/Two Story Residence: Two story  Living Alone: Yes  Support Systems: Child(elidia)  Patient Expects to be Discharged to[de-identified] Private residence  Current DME Used/Available at Home: None  Tub or Shower Type: Tub/Shower combination    EXAMINATION/PRESENTATION/DECISION MAKING:   Critical Behavior:  Neurologic State: Alert  Orientation Level: Oriented to person, Oriented to place, Oriented to situation     Safety/Judgement: Decreased awareness of environment, Decreased awareness of need for safety, Fall prevention  Hearing: Auditory  Auditory Impairment: None  Range Of Motion:   Generally impaired, functional                       Strength:        Generally weak, functional                 Tone & Sensation:                                  Coordination:     Vision:   Tracking: (not tested)  Acuity: (pt reports has glasses)  Functional Mobility:  Bed Mobility:     Supine to Sit: Supervision;Stand-by assistance; Additional time  Sit to Supine: Moderate assistance  Scooting: Minimum assistance; Additional time  Transfers:  Sit to Stand: Minimum assistance  Stand to Sit: Minimum assistance                       Balance:   Sitting: Impaired  Sitting - Static: Fair (occasional)  Sitting - Dynamic: Not tested  Standing: Impaired  Standing - Static: Constant support;Good  Standing - Dynamic : Constant support; Fair  Ambulation/Gait Training:  Distance (ft): 25 Feet (ft)  Assistive Device: Gait belt  Ambulation - Level of Assistance: Minimal assistance(lean to right)        Gait Abnormalities: Altered arm swing;Decreased step clearance;Trunk sway increased; Other(lean to right)        Base of Support: Widened     Speed/Abbie: Pace decreased (<100 feet/min)                        Stairs:                Functional Measure:  Barthel Index:    Bathin  Bladder: 10  Bowels: 10  Groomin  Dressin  Feeding: 10  Mobility: 0  Stairs: 0  Toilet Use: 5  Transfer (Bed to Chair and Back): 10  Total: 50/100       The Barthel ADL Index: Guidelines  1. The index should be used as a record of what a patient does, not as a record of what a patient could do. 2. The main aim is to establish degree of independence from any help, physical or verbal, however minor and for whatever reason. 3. The need for supervision renders the patient not independent. 4. A patient's performance should be established using the best available evidence. Asking the patient, friends/relatives and nurses are the usual sources, but direct observation and common sense are also important. However direct testing is not needed. 5. Usually the patient's performance over the preceding 24-48 hours is important, but occasionally longer periods will be relevant. 6. Middle categories imply that the patient supplies over 50 per cent of the effort. 7. Use of aids to be independent is allowed. Alma Harm., Barthel, D.W. (8757). Functional evaluation: the Barthel Index. [facility] (14)2. NNAMDI Ahn, Zeenat Ashton.Krys.[facility], [address] [place] [address] (). Measuring the change indisability after inpatient rehabilitation; comparison of the responsiveness of the Barthel Index and Functional [place] Measure. Journal of Neurology, Neurosurgery, and Psychiatry, 66(4), 652-686.   JAY JAY Taylor.A, CHAU Fuentes, & Garcia Will MHannaA. (2004.) Assessment of post-stroke quality of life in cost-effectiveness studies: The usefulness of the Barthel Index and the EuroQoL-5D. Quality of Life Research, 15, 119-28           Physical Therapy Evaluation Charge Determination   History Examination Presentation Decision-Making   MEDIUM  Complexity : 1-2 comorbidities / personal factors will impact the outcome/ POC  LOW Complexity : 1-2 Standardized tests and measures addressing body structure, function, activity limitation and / or participation in recreation  LOW Complexity : Stable, uncomplicated  LOW Complexity : FOTO score of       Based on the above components, the patient evaluation is determined to be of the following complexity level: LOW     Pain Rating:      Activity Tolerance:   Fair and requires rest breaks    After treatment patient left in no apparent distress:   Supine in bed, Call bell within reach, Bed / chair alarm activated, and Caregiver / family present    COMMUNICATION/EDUCATION:   The patients plan of care was discussed with: Occupational therapist and Registered nurse. Fall prevention education was provided and the patient/caregiver indicated understanding. and Patient/family agree to work toward stated goals and plan of care.     Thank you for this referral.  Rossy Martínez, PT   Time Calculation: 36 mins

## 2020-11-12 NOTE — PROGRESS NOTES
Oncology End of Shift Note      Bedside shift change report given to Grayson Vega RN (incoming nurse) by Stefanie López (outgoing nurse) on Rodney Santoro. Report included the following information SBAR, Kardex and MAR. Shift Summary: Patient tolerated care fairly. No complaints of pain. Medications given by nursing student and their professor. Education provided regarding all meds. Hourly rounding completed. Issues for Physician to Address:       Patient on Cardiac Monitoring?     [] Yes  [x] No           Shift Events        Stefanie López

## 2020-11-12 NOTE — PROGRESS NOTES
Occupational Therapy  Orders received and medical record reviewed. Pt participated in OT Evaluation. Pt was drowsy and uncomfortable on her R side-head, neck, back. She c/o pain in her RUE -shoulder and elbow and her forearm/ wrist/ hand is positioned in a splint-fingers are edematous with little movement. All due to a fall at her daughters home. Daughter's home has multiple steps and there is no bathroom on the main floor. At this time pt is unsteady and needs direct 1:1 assistance for adl mobility and adls. If discharged home she will need 24 hour assistance and direct assistance for mobility. Full OT note to follow.

## 2020-11-12 NOTE — INTERDISCIPLINARY ROUNDS
Oncology Interdisciplinary rounds were held today to discuss patient plan of care and outcomes. The following members were present: Nursing, Physician, Case Management, Pharmacy, and PT/OT Actual Length of Stay: 2 DRG GLOS: 5.8 Expected Length of Stay: Namon Exon Plan            Discharge Neurology, psychiatry, dietitian, and ortho following. PT/OT Date: Possible 11/12/2020. Patient lives alone  to follow up.

## 2020-11-12 NOTE — PROGRESS NOTES
Hospitalist Progress Note    NAME: Lisa Phan   :  1950   MRN:  [id number]     I reviewed pertinent labs and imaging, and discussed /agreed on the plan of care with Dr. Para Goodpasture. Assessment / Plan:  Acute/subacute delusional episode  Concerning for progressive neurocognitive pathology  TSH 1.37 B12 416  CT head 11/10/2020: IMPRESSION:   No acute intracranial process  Brain MRA : IMPRESSION:   1. No acute intracranial abnormality. 2. No intracranial stenosis. 3. Complete right maxillary sinus opacification, with expansion of the  ostiomeatal unit  MRI brain 11/10/2020: IMPRESSION:   1. No acute intracranial abnormality. 2. No intracranial stenosis. 3. Complete right maxillary sinus opacification, with expansion of the  ostiomeatal unit. MRI Cervical 11/10/2020: IMPRESSION:  Multilevel disc and facet degenerative change. Moderate left foraminal stenosis at C4-5. Mild central canal stenosis at C2-3, C3-4 and C5-6. EEG: Normal  Neurology in put appreciated  Psychiatry input appreciated  Started on zoloft &  risperdal  Hypertension POA  Continue metoprolol, losartan, triamterenehydrochlorothiazide)  Type 2 diabetes POA   continue Lantus 21 units daily   Sliding scale coverage    Monitor    Diabetic diet  Hyperlipidemia POA  Continue rosuvastatin  UTI POA  Urinalysis 4+ Bacteria, + Nitrites  Urine culture pending  Normal WBC's  Afebrile   Levaquin 500 mg daily x 3 days last dose 2020    40 or above Morbid obesity / Body mass index is 42.34 kg/m². Code status: Full  Prophylaxis: Lovenox  Recommended Disposition: Home w/Family     Subjective:     Chief Complaint / Reason for Physician Visit  Patient given 2 mg ativan at 2 am this morning. She has been very lethargic, she also has not slept well in a while. She was seen by PT who recommend home health. Discussed with Daughter. Discussed with RN events overnight.      Review of Systems:  Symptom Y/N Comments  Symptom Y/N Comments Fever/Chills n   Chest Pain n    Poor Appetite n   Edema n    Cough n   Abdominal Pain n    Sputum n   Joint Pain     SOB/HERMAN n   Pruritis/Rash     Nausea/vomit n   Tolerating PT/OT y    Diarrhea n   Tolerating Diet y    Constipation n   Other       Could NOT obtain due to:      Objective:     VITALS:   Last 24hrs VS reviewed since prior progress note. Most recent are:  Patient Vitals for the past 24 hrs:   Temp Pulse Resp BP SpO2   11/12/20 1456 97.6 °F (36.4 °C) 65 18 115/62 98 %   11/12/20 1145  63  124/64    11/12/20 0800 97.8 °F (36.6 °C) 76 14 121/66 98 %   11/11/20 2300 98.2 °F (36.8 °C) 81 18 (!) 155/80 100 %   11/11/20 1945 98.6 °F (37 °C) 81 18 (!) 160/78 98 %     No intake or output data in the 24 hours ending 11/12/20 1656     PHYSICAL EXAM:  General: Sleepy but arouses, cooperative, no acute distress    EENT:   Anicteric sclerae. noromocephalic  Resp:  CTA bilaterally, no wheezing or rales. No accessory muscle use  CV:  Regular  rhythm,  No edema  GI:  Soft, Non distended, Non tender.  +Bowel sounds  Neurologic:  sleepy, normal speech,   Psych:   Poor insight. Not anxious nor agitated  Skin:  No rashes. No jaundice    Reviewed most current lab test results and cultures  YES  Reviewed most current radiology test results   YES  Review and summation of old records today    NO  Reviewed patient's current orders and MAR    YES  PMH/ reviewed - no change compared to H&P  ________________________________________________________________________  Care Plan discussed with:    Comments   Patient y    Family  y daughter   RN y    Care Manager y    Consultant                        Multidiciplinary team rounds were held today with , nursing, pharmacist and clinical coordinator. Patient's plan of care was discussed; medications were reviewed and discharge planning was addressed. ________________________________________________________________________    ________________________________________________________________________  Hiro Linear, NP     Procedures: see electronic medical records for all procedures/Xrays and details which were not copied into this note but were reviewed prior to creation of Plan. LABS:  I reviewed today's most current labs and imaging studies.   Pertinent labs include:  Recent Labs     11/12/20 0343 11/09/20  2349   WBC 6.8 6.3   HGB 11.0* 12.9   HCT 33.5* 39.1   * 141*     Recent Labs     11/12/20  0343 11/09/20  2349    139   K 3.9 3.9    106   CO2 27 27   * 164*   BUN 21* 25*   CREA 0.94 1.27*   CA 9.0 9.8   ALB 2.8* 3.6   TBILI 0.5 0.5   ALT 23 34       Signed: Hiro Linear, NP

## 2020-11-12 NOTE — PROGRESS NOTES
Oncology End of Shift Note      Bedside shift change report given to Wallace Jacobsen RN (incoming nurse) by Magdaleno Moya (outgoing nurse) on [facility]. Report included the following information SBAR, Kardex, Intake/Output, MAR, Accordion and Recent Results. Shift Summary: PT remained stable throughout shift. Scheduled meds given, prn ativan given for agitation and hallucinations. Pt x1 assist to restroom. Pt resting quietly in bed. Issues for Physician to Address:       Patient on Cardiac Monitoring?     [] Yes  [x] No    Rhythm:      Ricky Scale:     Ricky Score: [address]

## 2020-11-13 VITALS
TEMPERATURE: 97.8 F | HEART RATE: 67 BPM | SYSTOLIC BLOOD PRESSURE: 115 MMHG | HEIGHT: 62 IN | RESPIRATION RATE: 16 BRPM | BODY MASS INDEX: 42.6 KG/M2 | WEIGHT: 231.48 LBS | OXYGEN SATURATION: 96 % | DIASTOLIC BLOOD PRESSURE: 54 MMHG

## 2020-11-13 PROBLEM — E11.9 TYPE 2 DIABETES MELLITUS, WITHOUT LONG-TERM CURRENT USE OF INSULIN (HCC): Status: ACTIVE | Noted: 2020-11-13

## 2020-11-13 LAB
ALBUMIN SERPL-MCNC: 3 G/DL (ref 3.5–5)
ALBUMIN/GLOB SERPL: 0.7 {RATIO} (ref 1.1–2.2)
ALP SERPL-CCNC: 78 U/L (ref 45–117)
ALT SERPL-CCNC: 26 U/L (ref 12–78)
ANION GAP SERPL CALC-SCNC: 5 MMOL/L (ref 5–15)
AST SERPL-CCNC: 12 U/L (ref 15–37)
BILIRUB SERPL-MCNC: 0.5 MG/DL (ref 0.2–1)
BUN SERPL-MCNC: 23 MG/DL (ref 6–20)
BUN/CREAT SERPL: 20 (ref 12–20)
CALCIUM SERPL-MCNC: 9.7 MG/DL (ref 8.5–10.1)
CHLORIDE SERPL-SCNC: 104 MMOL/L (ref 97–108)
CO2 SERPL-SCNC: 27 MMOL/L (ref 21–32)
CREAT SERPL-MCNC: 1.15 MG/DL (ref 0.55–1.02)
ERYTHROCYTE [DISTWIDTH] IN BLOOD BY AUTOMATED COUNT: 12.9 % (ref 11.5–14.5)
GLOBULIN SER CALC-MCNC: 4.1 G/DL (ref 2–4)
GLUCOSE BLD STRIP.AUTO-MCNC: 194 MG/DL (ref 65–100)
GLUCOSE SERPL-MCNC: 155 MG/DL (ref 65–100)
HCT VFR BLD AUTO: 37.4 % (ref 35–47)
HGB BLD-MCNC: 12.1 G/DL (ref 11.5–16)
MCH RBC QN AUTO: 32 PG (ref 26–34)
MCHC RBC AUTO-ENTMCNC: 32.4 G/DL (ref 30–36.5)
MCV RBC AUTO: 98.9 FL (ref 80–99)
NRBC # BLD: 0 K/UL (ref 0–0.01)
NRBC BLD-RTO: 0 PER 100 WBC
PLATELET # BLD AUTO: 132 K/UL (ref 150–400)
PMV BLD AUTO: 11.7 FL (ref 8.9–12.9)
POTASSIUM SERPL-SCNC: 3.9 MMOL/L (ref 3.5–5.1)
PROT SERPL-MCNC: 7.1 G/DL (ref 6.4–8.2)
RBC # BLD AUTO: 3.78 M/UL (ref 3.8–5.2)
SERVICE CMNT-IMP: ABNORMAL
SODIUM SERPL-SCNC: 136 MMOL/L (ref 136–145)
WBC # BLD AUTO: 6.4 K/UL (ref 3.6–11)

## 2020-11-13 PROCEDURE — 85027 COMPLETE CBC AUTOMATED: CPT

## 2020-11-13 PROCEDURE — 80053 COMPREHEN METABOLIC PANEL: CPT

## 2020-11-13 PROCEDURE — 74011250637 HC RX REV CODE- 250/637: Performed by: STUDENT IN AN ORGANIZED HEALTH CARE EDUCATION/TRAINING PROGRAM

## 2020-11-13 PROCEDURE — 97116 GAIT TRAINING THERAPY: CPT

## 2020-11-13 PROCEDURE — 74011250637 HC RX REV CODE- 250/637: Performed by: NURSE PRACTITIONER

## 2020-11-13 PROCEDURE — 36415 COLL VENOUS BLD VENIPUNCTURE: CPT

## 2020-11-13 PROCEDURE — 82962 GLUCOSE BLOOD TEST: CPT

## 2020-11-13 PROCEDURE — 74011636637 HC RX REV CODE- 636/637: Performed by: STUDENT IN AN ORGANIZED HEALTH CARE EDUCATION/TRAINING PROGRAM

## 2020-11-13 PROCEDURE — 97530 THERAPEUTIC ACTIVITIES: CPT | Performed by: OCCUPATIONAL THERAPIST

## 2020-11-13 RX ORDER — RISPERIDONE 0.5 MG/1
0.5 TABLET, FILM COATED ORAL 2 TIMES DAILY
Qty: 30 TAB | Refills: 1 | Status: SHIPPED | OUTPATIENT
Start: 2020-11-13 | End: 2021-02-04

## 2020-11-13 RX ORDER — SERTRALINE HYDROCHLORIDE 25 MG/1
25 TABLET, FILM COATED ORAL DAILY
Qty: 30 TAB | Refills: 1 | Status: SHIPPED | OUTPATIENT
Start: 2020-11-14 | End: 2021-04-09 | Stop reason: SDUPTHER

## 2020-11-13 RX ADMIN — INSULIN GLARGINE 21 UNITS: 100 INJECTION, SOLUTION SUBCUTANEOUS at 09:53

## 2020-11-13 RX ADMIN — LEVOFLOXACIN 500 MG: 500 TABLET, FILM COATED ORAL at 09:52

## 2020-11-13 RX ADMIN — TRIAMTERENE AND HYDROCHLOROTHIAZIDE 1 TABLET: 37.5; 25 TABLET ORAL at 09:53

## 2020-11-13 RX ADMIN — SERTRALINE HYDROCHLORIDE 25 MG: 50 TABLET ORAL at 09:52

## 2020-11-13 RX ADMIN — Medication 10 ML: at 05:34

## 2020-11-13 RX ADMIN — METOPROLOL SUCCINATE 50 MG: 50 TABLET, EXTENDED RELEASE ORAL at 09:51

## 2020-11-13 RX ADMIN — LOSARTAN POTASSIUM 100 MG: 100 TABLET, FILM COATED ORAL at 09:54

## 2020-11-13 RX ADMIN — ROSUVASTATIN CALCIUM 10 MG: 10 TABLET, COATED ORAL at 09:52

## 2020-11-13 NOTE — PROGRESS NOTES
Bedside shift change report given to Travis Candelaria (oncoming nurse) by Gilda Lopez student RN (offgoing nurse). Report included the following information SBAR, MAR and Recent Results. Shift summary: pt does not reorient well. Pt able to answer orientation questions but reports that she sees a man in her room and bugs crawling everywhere. Ambulated to bathroom with 1 assist. Pt complained of head and neck pain, PRN Tylenol given. Labs drawn. Pt fell asleep at 0200.

## 2020-11-13 NOTE — DISCHARGE INSTRUCTIONS
Patient Education        Altered Mental Status: Care Instructions  Your Care Instructions     Altered mental status is a change in how well your brain is working. As a result, you may be confused, be less alert than usual, or act in odd ways. This may include seeing or hearing things that aren't really there (hallucinations). A mental status change has many possible causes. For example, it may be the result of an infection, an imbalance of chemicals in the body, or a chronic disease such as diabetes or COPD. It can also be caused by things such as a head injury, taking certain medicines, or using alcohol or drugs. The doctor may do tests to look for the cause. These tests may include urine tests, blood tests, and imaging tests such as a CT scan. Sometimes a clear cause isn't found. But tests can help the doctor rule out a serious cause of your symptoms. A change in mental status can be scary. But mental status will often return to normal when the cause is treated. So it is important to get any follow-up testing or treatment the doctor has suggested. The doctor has checked you carefully, but problems can develop later. If you notice any problems or new symptoms, get medical treatment right away. Follow-up care is a key part of your treatment and safety. Be sure to make and go to all appointments, and call your doctor if you are having problems. It's also a good idea to know your test results and keep a list of the medicines you take. How can you care for yourself at home? · Be safe with medicines. Take your medicines exactly as prescribed. Call your doctor if you think you are having a problem with your medicine. · Have another adult stay with you until you are better. This can help keep you safe. Ask that person to watch for signs that your mental status is getting worse. When should you call for help? Call 911 anytime you think you may need emergency care.  For example, call if:    · You passed out (lost consciousness). Call your doctor now or seek immediate medical care if:    · Your mental status is getting worse.     · You have new symptoms, such as a fever, chills, or shortness of breath.     · You do not feel safe. Watch closely for changes in your health, and be sure to contact your doctor if:    · You do not get better as expected. Where can you learn more? Go to http://www.[employer].com/  Enter J452 in the search box to learn more about \"Altered Mental Status: Care Instructions. \"  Current as of: November 20, 2019               Content Version: 12.6  © 5079-3752 [employer]. Care instructions adapted under license by [employer] (which disclaims liability or warranty for this information). If you have questions about a medical condition or this instruction, always ask your healthcare professional. [place] [address] any warranty or liability for your use of this information. Patient Discharge Instructions     Pt Name  Boyd Bautista   Date of Birth 1950   Age  69 y.o. Medical Record Number  [id number]   PCP Adia Weiss MD    Admit date:  11/9/2020 @    [address] [place] [address]    Room Number  1115/01   Date of Discharge 11/13/2020     Admission Diagnoses:     <principal problem not specified>        No Known Allergies     You were admitted to [address] [place] [address] for  <principal problem not specified>    YOUR OTHER MEDICAL DIAGNOSES INCLUDE (BUT NOT LIMITED TO ):  Present on Admission:   Acute confusion   Hallucinations   Acute alteration in mental status   Psychosis ([facility] [address]   Essential (primary) hypertension   Type 2 diabetes mellitus, without long-term current use of insulin ([facility])      DIET:  Diabetic Diet   Oral Nutritional Supplements: Glucerna  Supplement Frequency: Once daily    Recommended activity: Activity as tolerated  Follow up :    Follow-up Information     Follow up With Specialties Details Why Contact Crystal Chavez NP Nurse Practitioner In 1 week Hospital follow up, [address], [address] [facility] [address]      [address] Call in 1 day this is your home health provider. [address] [facility], [facility] [facility]    Adia Weiss MD Family Medicine In 1 week [facility] [place] [address] [facility]  [phone]                   · It is important that you take the medication exactly as they are prescribed. · Keep your medication in the bottles provided by the pharmacist and keep a list of the medication names, dosages, and times to be taken in your wallet. · Do not take other medications without consulting your doctor. ADDITIONAL INFORMATION: If you experience any of the following symptoms or have any health problem not listed below, then please call your primary care physician or return to the emergency room if you cannot get hold of your doctor: Fever, chills, nausea, vomiting, diarrhea, change in mentation, falling, bleeding, shortness of breath. I understand that if any problems occur once I am discharged, I am supposed to call my Primary care physician for further care or seek help in the Emergency Department at the nearest Healthcare facility. I have had an opportunity to discuss my clinical issues with my doctor and nursing staff. I understand and acknowledge receipt of the above instructions.                                                                                                                                            Physician's or R.N.'s Signature                                                            Date/Time                                                                                                                                              Patient or Representative Signature Date/Time

## 2020-11-13 NOTE — PROGRESS NOTES
Problem: Mobility Impaired (Adult and Pediatric)  Goal: *Acute Goals and Plan of Care (Insert Text)  Description: FUNCTIONAL STATUS PRIOR TO ADMISSION: Patient was living alone and indep until recently began having hallucinations and AMS and daughter took her to her home. Patient had a fall on the stairs at daughter's house  with injury to right wrist (now in splint)    [address]: Patient was indep with ADLs until recent AMS and psychiatric issues. Physical Therapy Goals  Initiated 11/12/2020  1. Patient will move from supine to sit and sit to supine  in bed with modified independence within 7 day(s). 2.  Patient will transfer from bed to chair and chair to bed with modified independence using the least restrictive device within 7 day(s). 3.  Patient will perform sit to stand with modified independence within 7 day(s). 4.  Patient will ambulate with supervision/set-up for 200 feet with the least restrictive device within 7 day(s). 5.  Patient will ascend/descend 12 stairs with  handrail(s) with supervision/set-up within 7 day(s). Outcome: Progressing Towards Goal   PHYSICAL THERAPY TREATMENT  Patient: Abi Cooney (89 y.o. female)  Date: 11/13/2020  Diagnosis: Acute confusion [R41.0]   <principal problem not specified>       Precautions:    Chart, physical therapy assessment, plan of care and goals were reviewed. ASSESSMENT  Patient continues with skilled PT services and is progressing towards goals. Patient received sitting EOB and agreeable to participate once she understood we were not there to draw blood. Patient with improved functional activity and tolerance today, but remains confused and hallucinating (didn't want to put mask on because there were \"hairs all over it\"). Patient ambulated in hallway x approx 200 feet with slowed liban and increased trunk sway, but steady today with no overt LOB.   Patient refused to practice stairs even when explained that it was important to do safely since it is where her fall occurred. Patient left sitting EOB, educated on falls prevention and safety. Patient remains at high risk of falls due to impulsivity, hallucinations and decreased safety awareness and will need constant supervision of family at home. Current Level of Function Impacting Discharge (mobility/balance): supervision for safety    Other factors to consider for discharge: high falls risk         PLAN :  Patient continues to benefit from skilled intervention to address the above impairments. Continue treatment per established plan of care. to address goals. Recommendation for discharge: (in order for the patient to meet his/her long term goals)  Physical therapy at least 2 days/week in the home AND ensure assist and/or supervision for safety with mobility    This discharge recommendation:  Has been made in collaboration with the attending provider and/or case management    IF patient discharges home will need the following DME: none       SUBJECTIVE:   Patient stated are you one of those demons? They come in all sizes.     OBJECTIVE DATA SUMMARY:   Critical Behavior:  Neurologic State: Alert  Orientation Level: Oriented to person, Oriented to place, Oriented to time, Disoriented to situation(Pt not sure why she is here)     Safety/Judgement: Decreased awareness of environment, Decreased awareness of need for safety, Fall prevention  Functional Mobility Training:  Bed Mobility:                    Transfers:  Sit to Stand: Modified independent  Stand to Sit: Modified independent                             Balance:  Sitting: Intact  Standing: Intact  Ambulation/Gait Training:  Distance (ft): 200 Feet (ft)  Assistive Device: Gait belt  Ambulation - Level of Assistance: Supervision;Stand-by assistance        Gait Abnormalities: Decreased step clearance;Trunk sway increased        Base of Support: Widened     Speed/Abbie: Pace decreased (<100 feet/min) Stairs: Activity Tolerance:   Good    After treatment patient left in no apparent distress:   Sitting EOB    COMMUNICATION/COLLABORATION:   The patients plan of care was discussed with: Occupational therapist and Registered nurse.      Valery Ivy, PT   Time Calculation: 18 mins

## 2020-11-13 NOTE — ADT AUTH CERT NOTES
MESSAGE FROM NURSE: 
 
Psych eval and plan under Day 2 Review additional notes section. No notes available for  Patient Demographics Patient Name Gosia Castillo  
[id number]  Sex Female    
1950  Address [address] [place] [address]  Phone [phone] (Home) *Preferred*  
[phone] [facility]) CSN:   
[id number] Admit Date:  Admit Time  Room  Bed 2020  11:10 PM  1115 [[id number]]   [[id number]] Attending Providers Provider  Pager  From  To Radha Wahl MD   11/09/20  11/10/20 Raimundo Swanson MD   11/10/20  11/10/20 Mia Murphy MD   11/10/20  11/10/20 Raimundo Swanson MD   11/10/20  11/10/20 Mia Murphy MD   11/10/20  11/11/20 Elliott Brandon MD   20 Emergency Contact(s) Name  Relation  Home  Work  Mobile Diego Blackburn  [phone] Utilization Reviews  
 
   
Delirium - Care Day 3 (2020) by Rashid Meek, CORDELIA  
 
   
Review Entered  Review Status 2020 10:18  Completed   
   
Criteria Review Care Day: 3 Care Date: 2020 Level of Care: Inpatient Floor Guideline Day 2 Clinical Status ( ) * Delirium resolving or of known treatable cause 2020 10:18:11 EST by Jazlyn Johnston Patient stated I see the people but they go away when I get close.  (X) * No dangerous behavior [L] for at least 24 hours Interventions   
(X) * Close observation at reduced intensity 2020 10:18:11 EST by Rashid Meek   
  Hourly rounds   
(X) Oral hydration 2020 10:18:11 EST by Rashid Meek   
  Oral hydration allowed Medications (X) Medication review, adjustment 2020 10:18:11 EST by Jazlyn Johnston completed Evaluation   
(X) * Evaluation completed and reviewed 2020 10:18:11 EST by Jazlyn Johnston completed (X) Condition assessed multiple times per shift 11/13/2020 10:18:11 EST by Akosua Cook   
  . Hourly rounding completed. * Milestone Additional Notes Day 3: 11/12 PT Notes-   
SUBJECTIVE:   
Patient stated \"I see the people but they go away when I get close. \"   
  
IM-   
Subjective:   
    
Chief Complaint / Reason for Physician Visit Patient given 2 mg ativan at 2 am this morning. She has been very lethargic, she also has not slept well in a while.  She was seen by PT who recommend home health Objective:   
    
VITALS:   
Last 24hrs VS reviewed since prior progress note. Most recent are:   
Patient Vitals for the past 24 hrs:   
  Temp Pulse Resp BP SpO2   
11/12/20 1456 97.6 °F (36.4 °C) 65 18 115/62 98 % 11/12/20 1145 - 63 - 124/64 -   
11/12/20 0800 97.8 °F (36.6 °C) 76 14 121/66 98 % 11/11/20 2300 98.2 °F (36.8 °C) 81 18 (!) 155/80 100 % 11/11/20 1945 98.6 °F (37 °C) 81 18 (!) 160/78 98 %   
    
No intake or output data in the 24 hours ending 11/12/20 1656   
    
PHYSICAL EXAM:   
General:          Sleepy but arouses, cooperative, no acute distress     
EENT:               Anicteric sclerae. noromocephalic Resp:               CTA bilaterally, no wheezing or rales.  No accessory muscle use CV:                  Regular  rhythm,  No edema GI:                   Soft, Non distended, Non tender.  +Bowel sounds Neurologic:      sleepy, normal speech, Psych:             Poor insight. Not anxious nor agitated Skin:                No rashes.  No jaundice Labs:  T. pallidum Ab (FTA-Ab)  Comment  HGB  11.0  HCT  33.5  Glucose  235  BUN/Creatinine ratio  22  AST (SGOT)  11  Albumin  2.8 Meds: (TYLENOL) tablet 650 mg  Dose: 650 mg   
Freq: EVERY 6 HOURS AS NEEDED Route: PO (x1)   
(LOVENOX) injection 40 mg  Dose: 40 mg   
Freq: EVERY 24 HOURS Route: SC (LANTUS) injection 21 Units  Dose: 0.2 Units/kg Weight Dosing Info: 105 kg Freq: DAILY Route: SC (HUMALOG) injection     
 Freq: 4 TIMES DAILY BEFORE MEALS & NIGHTLY Route: SC(4units given) (LEVAQUIN) tablet 500 mg  Dose: 500 mg Freq: EVERY 24 HOURS Route: PO   
(COZAAR) tablet 100 mg  Dose: 100 mg Freq: DAILY Route: PO   
(TOPROL-XL) XL tablet 50 mg  Dose: 50 mg Freq: DAILY Route: PO (RisperDAL) tablet 0.5 mg  Dose: 0.5 mg Freq: 2 TIMES DAILY Route: PO (CRESTOR) tablet 10 mg  Dose: 10 mg Freq: DAILY Route: PO   
(ZOLOFT) tablet 25 mg  Dose: 25 mg  Freq: DAILY Route: PO   
(ULTRAM) tablet 50 mg  Dose: 50 mg Freq: EVERY 6 HOURS AS NEEDED Route: PO (x1) (MAXZIDE) 37.5-25 mg per tablet 1 Tab  Dose: 1 Tab Freq: DAILY Route: PO   
(ATIVAN) injection 1-2 mg  Dose: 1-2 mg   
Freq: EVERY 6 HOURS AS NEEDED Route: IV Assessment / Plan:   
Acute/subacute delusional episode Concerning for progressive neurocognitive pathology JEREMY 4.11 H40 029 CT head 11/10/2020: IMPRESSION:   
No acute intracranial process Brain MRA 11/100/2020: IMPRESSION:   
1. No acute intracranial abnormality. 2. No intracranial stenosis. 3. Complete right maxillary sinus opacification, with expansion of the   
ostiomeatal unit MRI brain 11/10/2020: IMPRESSION:   
1. No acute intracranial abnormality. 2. No intracranial stenosis. 3. Complete right maxillary sinus opacification, with expansion of the   
ostiomeatal unit. MRI Cervical 11/10/2020: IMPRESSION:   
Multilevel disc and facet degenerative change. Moderate left foraminal stenosis at C4-5. Mild central canal stenosis at C2-3, C3-4 and C5-6. EEG: Normal   
Neurology in put appreciated Psychiatry input appreciated Started on zoloft &  risperdal   
Hypertension POA Continue metoprolol, losartan, triamterene-hydrochlorothiazide) Type 2 diabetes POA   
 continue Lantus 21 units daily   
 Sliding scale coverage   
  Monitor   
  Diabetic diet Hyperlipidemia POA Continue rosuvastatin UTI POA Urinalysis 4+ Bacteria, + Nitrites Urine culture pending Normal WBC's Afebrile Levaquin 500 mg daily x 3 days last dose 11/14/2020

## 2020-11-13 NOTE — PROGRESS NOTES
Problem: Falls - Risk of  Goal: *Absence of Falls  Description: Document Adin Apo Fall Risk and appropriate interventions in the flowsheet. Outcome: Progressing Towards Goal  Note: Fall Risk Interventions:  Mobility Interventions: Bed/chair exit alarm, Communicate number of staff needed for ambulation/transfer, Patient to call before getting OOB, Utilize gait belt for transfers/ambulation    Mentation Interventions: Adequate sleep, hydration, pain control, Bed/chair exit alarm, Door open when patient unattended, More frequent rounding    Medication Interventions: Bed/chair exit alarm, Patient to call before getting OOB, Teach patient to arise slowly, Utilize gait belt for transfers/ambulation    Elimination Interventions: Bed/chair exit alarm, Call light in reach, Patient to call for help with toileting needs, Stay With Me (per policy), Toileting schedule/hourly rounds    History of Falls Interventions: Bed/chair exit alarm, Door open when patient unattended, Utilize gait belt for transfer/ambulation         Problem: Pressure Injury - Risk of  Goal: *Prevention of pressure injury  Description: Document Ricky Scale and appropriate interventions in the flowsheet. Outcome: Progressing Towards Goal  Note: Pressure Injury Interventions:  Sensory Interventions: Assess changes in LOC, Assess need for specialty bed, Keep linens dry and wrinkle-free, Maintain/enhance activity level, Minimize linen layers    Moisture Interventions: Absorbent underpads, Apply protective barrier, creams and emollients, Limit adult briefs, Maintain skin hydration (lotion/cream), Minimize layers    Activity Interventions: Pressure redistribution bed/mattress(bed type), Increase time out of bed    Mobility Interventions: Float heels, HOB 30 degrees or less, Pressure redistribution bed/mattress (bed type), Turn and reposition approx.  every two hours(pillow and wedges)    Nutrition Interventions: Offer support with meals,snacks and hydration    Friction and Shear Interventions: Apply protective barrier, creams and emollients, HOB 30 degrees or less, Transferring/repositioning devices
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
## 2020-11-13 NOTE — PROGRESS NOTES
Problem: Falls - Risk of  Goal: *Absence of Falls  Description: Document Sergio Parry Fall Risk and appropriate interventions in the flowsheet. Outcome: Progressing Towards Goal  Note: Fall Risk Interventions:  Mobility Interventions: Bed/chair exit alarm    Mentation Interventions: Adequate sleep, hydration, pain control    Medication Interventions: Bed/chair exit alarm    Elimination Interventions: Call light in reach    History of Falls Interventions: Bed/chair exit alarm         Problem: Patient Education: Go to Patient Education Activity  Goal: Patient/Family Education  Outcome: Progressing Towards Goal     Problem: Diabetes Self-Management  Goal: *Disease process and treatment process  Description: Define diabetes and identify own type of diabetes; list 3 options for treating diabetes.   Outcome: Progressing Towards Goal

## 2020-11-13 NOTE — ED PROVIDER NOTES
EMERGENCY DEPARTMENT HISTORY AND PHYSICAL EXAM      Date: 11/9/2020  Patient Name: Moises Naranjo    History of Presenting Illness     Chief Complaint   Patient presents with    Fall     fell at home down 6 stairs       History Provided By: Patient and Patient's Daughter    HPI: Moises Naranjo, 69 y.o. female with PMHx significant for diabetes, hypertension, hyperlipidemia presents to the ED with a contusion to her head/head injury after a fall down about 4 stairs. .  She complains of head pain, right wrist pain, and right hip pain. EMS was called. Daughter reports that patient has been more confused than usual.  Patient placed in c-collar prior to arrival.  Patient is very drowsy, and will not give much of the history about what happened, but she denies any fevers, chills, cough chest pain, shortness of breath, nausea, vomiting, diarrhea. She denies any dysuria, hematuria, urinary frequency. PCP: Dahlia Clifton MD    No current facility-administered medications on file prior to encounter. Current Outpatient Medications on File Prior to Encounter   Medication Sig Dispense Refill    permethrin (ACTICIN) 5 % topical cream As directed 60 g 0    Cetirizine (ZyrTEC) 10 mg cap Take 10 mg by mouth daily. 7 Cap 0    losartan (COZAAR) 100 mg tablet Take 1 tablet by mouth once daily 90 Tab 0    triamterene-hydroCHLOROthiazide (MAXZIDE) 37.5-25 mg per tablet Take 1 tablet by mouth once daily 90 Tab 0    metoprolol succinate (TOPROL-XL) 50 mg XL tablet Take 1 tablet by mouth once daily 90 Tab 0    metFORMIN (GLUCOPHAGE) 500 mg tablet TAKE 1 TABLET BY MOUTH TWICE DAILY WITH MEALS 180 Tab 0    omega-3 acid ethyl esters (LOVAZA) 1 gram capsule Take 2 Caps by mouth two (2) times a day.  360 Cap 1    rosuvastatin (CRESTOR) 10 mg tablet TAKE 1 TABLET BY MOUTH ONCE DAILY         Past History     Past Medical History:  Past Medical History:   Diagnosis Date    Chronic osteoarthritis 7/17/2020    Diabetes ([facility] [address]  Elevated glucose 7/17/2020    Essential (primary) hypertension 7/17/2020    Menopause     Mixed hyperlipidemia 7/17/2020       Past Surgical History:  Past Surgical History:   Procedure Laterality Date    HX BREAST BIOPSY Left 11/2019    HX GYN         Family History:  Family History   Problem Relation Age of Onset    Heart Disease Mother     Heart Disease Brother        Social History:  Social History     Tobacco Use    Smoking status: Current Every Day Smoker     Packs/day: 0.25    Smokeless tobacco: Never Used   Substance Use Topics    Alcohol use: Not Currently     Comment: Occasionally    Drug use: Not Currently       Allergies:  No Known Allergies      Review of Systems   Review of Systems   Constitutional: Negative for chills and fever. HENT: Negative for sore throat. Respiratory: Negative for cough and shortness of breath. Gastrointestinal: Negative for abdominal pain, diarrhea, nausea and vomiting. Genitourinary: Negative for dysuria, flank pain and hematuria. Musculoskeletal: Positive for arthralgias. Negative for neck pain. Skin: Negative for rash and wound. Neurological: Positive for headaches. Negative for dizziness, syncope and light-headedness. Psychiatric/Behavioral: Positive for confusion. The patient is not nervous/anxious.           Physical Exam    General appearance - well nourished, well appearing, and in no distress  Head-large hematoma right parietal area  Eyes - pupils equal and reactive, extraocular eye movements intact  ENT - mucous membranes moist, pharynx normal without lesions  Neck - supple, no significant adenopathy; non-tender to palpation  Chest - clear to auscultation, no wheezes, rales or rhonchi; non-tender to palpation  Heart - normal rate and regular rhythm, S1 and S2 normal, no murmurs noted  Abdomen - soft, nontender, nondistended, no masses or organomegaly  Musculoskeletal -tender to palpation right wrist and right hip, no deformity or swelling; normal ROM  Extremities - peripheral pulses normal, no pedal edema  Skin - normal coloration and turgor, no rashes. Scratches all over abdomen  Neurological -very drowsy, has to be awakened to answer questions but when awakened, she is oriented x3, normal speech, no focal findings or movement disorder noted    Diagnostic Study Results     Labs -     Recent Results (from the past 12 hour(s))   CBC W/O DIFF    Collection Time: 11/13/20  1:32 AM   Result Value Ref Range    WBC 6.4 3.6 - 11.0 K/uL    RBC 3.78 (L) 3.80 - 5.20 M/uL    HGB 12.1 11.5 - 16.0 g/dL    HCT 37.4 35.0 - 47.0 %    MCV 98.9 80.0 - 99.0 FL    MCH 32.0 26.0 - 34.0 PG    MCHC 32.4 30.0 - 36.5 g/dL    RDW 12.9 11.5 - 14.5 %    PLATELET 783 (L) 380 - 400 K/uL    MPV 11.7 8.9 - 12.9 FL    NRBC 0.0 0  WBC    ABSOLUTE NRBC 0.00 0.00 - 2.36 K/uL   METABOLIC PANEL, COMPREHENSIVE    Collection Time: 11/13/20  1:32 AM   Result Value Ref Range    Sodium 136 136 - 145 mmol/L    Potassium 3.9 3.5 - 5.1 mmol/L    Chloride 104 97 - 108 mmol/L    CO2 27 21 - 32 mmol/L    Anion gap 5 5 - 15 mmol/L    Glucose 155 (H) 65 - 100 mg/dL    BUN 23 (H) 6 - 20 MG/DL    Creatinine 1.15 (H) 0.55 - 1.02 MG/DL    BUN/Creatinine ratio 20 12 - 20      GFR est AA 56 (L) >60 ml/min/1.73m2    GFR est non-AA 47 (L) >60 ml/min/1.73m2    Calcium 9.7 8.5 - 10.1 MG/DL    Bilirubin, total 0.5 0.2 - 1.0 MG/DL    ALT (SGPT) 26 12 - 78 U/L    AST (SGOT) 12 (L) 15 - 37 U/L    Alk. phosphatase 78 45 - 117 U/L    Protein, total 7.1 6.4 - 8.2 g/dL    Albumin 3.0 (L) 3.5 - 5.0 g/dL    Globulin 4.1 (H) 2.0 - 4.0 g/dL    A-G Ratio 0.7 (L) 1.1 - 2.2     GLUCOSE, POC    Collection Time: 11/13/20  8:11 AM   Result Value Ref Range    Glucose (POC) 194 (H) 65 - 100 mg/dL    Performed by [employer]        Radiologic Studies -   MRI BRAIN WO CONT   Final Result   IMPRESSION:    1. No acute intracranial abnormality. 2. No intracranial stenosis.    3. Complete right maxillary sinus opacification, with expansion of the   ostiomeatal unit. MRA BRAIN WO CONT   Final Result   IMPRESSION:    1. No acute intracranial abnormality. 2. No intracranial stenosis. 3. Complete right maxillary sinus opacification, with expansion of the   ostiomeatal unit. MRI CERV SPINE WO CONT   Final Result   IMPRESSION:   Multilevel disc and facet degenerative change. Moderate left foraminal stenosis at C4-5. Mild central canal stenosis at C2-3, C3-4 and C5-6. XR CHEST PORT   Final Result   IMPRESSION:   No acute process identified. .             XR HIP RT W OR WO PELV 2-3 VWS   Final Result   IMPRESSION:    No acute abnormality. XR WRIST RT AP/LAT/OBL MIN 3V   Final Result   IMPRESSION: No acute abnormality. CT HEAD WO CONT   Final Result   IMPRESSION:    No acute intracranial process. CT SPINE CERV WO CONT   Final Result   IMPRESSION:   There is no acute fracture or dislocation identified. Multilevel canal and foraminal stenoses. Nonemergent outpatient MRI of cervical spine for further delineation   recommended. CT Results  (Last 48 hours)    None        CXR Results  (Last 48 hours)    None            Medical Decision Making   I am the first provider for this patient. I reviewed the vital signs, available nursing notes, past medical history, past surgical history, family history and social history. Vital Signs-Reviewed the patient's vital signs.   Patient Vitals for the past 12 hrs:   Temp Pulse Resp BP SpO2   11/13/20 0745 97.8 °F (36.6 °C) 67 16 (!) 115/54 96 %   11/12/20 2314 98.3 °F (36.8 °C) 68 18 (!) 111/41 96 %       EKG normal sinus rhythm, 71 bpm, normal axis, normal OR, QRS, QTc intervals, nonspecific ST changes    Records Reviewed: Nursing Notes and Old Medical Records    Provider Notes (Medical Decision Making):   Differential diagnosis: Fracture, contusion, sprain intracranial bleed, stroke, orthostatic hypotension, dehydration, metabolic encephalopathy, UTI  We will check CBC, CMP, CPK, troponin, EKG, head CT, C-spine CT, x-rays of right wrist and right hip,, UA. ED Course:   Initial assessment performed. The patients presenting problems have been discussed, and they are in agreement with the care plan formulated and outlined with them. I have encouraged them to ask questions as they arise throughout their visit. Progress Notes:    Case discussed with daughter who arrived later to the ED. Daughter reports that for the past several weeks, patient has been acting \"weird\". Patient was living in her own house until about a week ago when her daughter was concerned enough that she went to pick her up and bring her back to her house. She seems to have been having hallucinations and reports seeing her niece living in her clock and hair growing out of her ceiling. She has been wandering around the house and is not sleeping well. She states that her mother has been having thoughts of people following her and seeing bugs in her chair and bubbles in her bed. Tonight she came into her daughter's room because she was concerned about the hallucinations that she has been having. Her daughter told her to go back to bed and it was on her way back to her room that she fell down the steps. Daughter reports that she is concerned her mother is possessed by a demon. ED Course as of Nov 13 0943   Tue Nov 10, 2020   [id number]  patient with altered mental status and visual hallucinations. This may be psychiatric in nature, however cannot exclude delirium due to UTI. Will admit to hospitalist Case discussed with  (hospitalist) who will see and admit the patient.    [AO]      ED Course User Index  [AO] Radha Wahl MD       Disposition:  Admit to hospitalist      Diagnosis     Clinical Impression:   1. Urinary tract infection without hematuria, site unspecified    2. Bilateral carotid artery stenosis    3.  Cerebral microvascular disease 4. Altered mental status, unspecified altered mental status type    5.  Hallucinations, visual

## 2020-11-13 NOTE — PROGRESS NOTES
Problem: Self Care Deficits Care Plan (Adult)  Goal: *Acute Goals and Plan of Care (Insert Text)  Description:   FUNCTIONAL STATUS PRIOR TO ADMISSION: At baseline, pt lived alone and was indep in adls and IADLs including driving, shopping, preparing meals, housekeeping. Recently, pt had been having hallucinations and daughter brought pt to her home where per medical record, family members were available to assist .  Pt had a fall on the stairs, and was brought to ED    HOME SUPPORT: The patient lived alone with no local support. Recently The patient was  living  with her daughter. Occupational Therapy Goals  Initiated 11/12/2020  1. Patient will perform grooming with supervision/set-up, using one handed techniques, within 7 day(s). 2.  Patient will perform bathing with minimal assistance/contact guard assist within 7 day(s). 3.  Patient will perform lower body dressing with supervision/set-up, using appropriate one hand techniques,  within 7 day(s). 4.  Patient will perform toilet transfers with supervision/set-up within 7 day(s). 5.  Patient will perform all aspects of toileting with supervision/set-up within 7 day(s). Outcome: Progressing Towards Goal   OCCUPATIONAL THERAPY TREATMENT  Patient: Yolanda Guidry (59 y.o. female)  Date: 11/13/2020  Diagnosis: Acute confusion [R41.0]   <principal problem not specified>       Precautions:  fall  Chart, occupational therapy assessment, plan of care, and goals were reviewed. ASSESSMENT  Patient continues with skilled OT services and is progressing towards goals. Pt is more alert today; she is very self directed and somewhat more steady on her feet during adl mobility. Pt reports that she's been up in her room. She is very concerned about what is on surfaces (I.e. mask (sees hair on it), seat on couch (wrinkles in upholstery are ?) and declined to sit, despite therapist brushing off a place for her.    Pt adamantly said \"no\" many times to suggestion to  trying the stairs stating that she wasn't going in there  (the stairwell). Her daughter's home has stairs that she needs to negotiate and fell on the stairs PTA. Pt will need 24 hour assistance at home. She reports that her daughter would not want therapists in her home. Current Level of Function Impacting Discharge (ADLs): set up to maximal assistance (lower body) for adls. Other factors to consider for discharge: psychiatric issues? Seems to have some paranoia and hallucinations         PLAN :  Patient continues to benefit from skilled intervention to address the above impairments. Continue treatment per established plan of care. to address goals. Recommend with staff: encourage safe adl mobility      Recommendation for discharge: (in order for the patient to meet his/her long term goals)  Occupational therapy at least 2 days/week in the home AND ensure assist and/or supervision for safety with adls/IADLs    This discharge recommendation:  Has not yet been discussed the attending provider and/or case management    IF patient discharges home will need the following DME: likely none       SUBJECTIVE:   Patient stated Erickson Hicks do all that harm to me . .. I didn't do nothin to them.     OBJECTIVE DATA SUMMARY:   Cognitive/Behavioral Status:  Neurologic State: Alert  Orientation Level: Oriented to person;Oriented to place;Oriented to time;Disoriented to situation(Pt not sure why she is here)                Functional Mobility and Transfers for ADLs:  Bed Mobility:       Transfers:  Sit to Stand: Modified independent          Balance:  Sitting: Intact  Standing: Intact    ADL Intervention:   Pt requested assistance for donning/doffing socks this date. She declined education for learning one hand sock technique and bending forward to her feet.                                          After treatment patient left in no apparent distress:   Sitting EOB, nurse aware    COMMUNICATION/COLLABORATION:   The patients plan of care was discussed with: Physical therapist and Registered nurse.      Zuleima Santillan, OTR/L  Time Calculation: 17 mins

## 2020-11-13 NOTE — PROGRESS NOTES
DOMINIC Plan:   Home with daughter  2nd IMM letter  Home with Jessy Sebastian- referral sent to [facility] [place]   Daughter will transport home  Follow up with PCP    CM spoke with daughter, Wylie Opitz, by phone this afternoon to confirm d/c plan. Anticipate d/c later today. Daughter able to  the pt at [facility] this evening. Discussed recommendation for [facility]. Daughter agreeable and with no preference indicated, referral sent via [employer] to [facility] [place]. Referral received and is pending review. CM verbally reviewed 2nd IMM letter with daughter by phone today. She verbalized understanding and had no further questions for CM at this time. She requested to speak with nursing staff in regards to pt needing sleep at home. Notified NP. Daughter is working to secure additional supervision in the home. Pt will discharge to her daughters home located at [address] [place] [address]. Pt is ready for d/c from a CM standpoint. Assigned RN informed. Care Management Interventions  PCP Verified by CM: Yes  Palliative Care Criteria Met (RRAT>21 & CHF Dx)?: No  Mode of Transport at Discharge:  Other (see comment)(daughter)  Transition of Care Consult (CM Consult): [address]: No  Reason Outside [place]: Managed care specific requirement  Discharge Durable Medical Equipment: No  Physical Therapy Consult: Yes  Occupational Therapy Consult: Yes  Speech Therapy Consult: No  Current Support Network: Relative's Home(lives with daughter )  Confirm Follow Up Transport: Family  The Patient and/or Patient Representative was Provided with a Choice of Provider and Agrees with the Discharge Plan?: Yes  Freedom of Choice List was Provided with Basic Dialogue that Supports the Patient's Individualized Plan of Care/Goals, Treatment Preferences and Shares the Quality Data Associated with the Providers?: Yes  Discharge Location  Discharge Placement: Home with home health(referral sent to [facility])    BINU Lowery   Care Manager, ED [facility]  [phone]

## 2020-11-19 ENCOUNTER — TELEPHONE (OUTPATIENT)
Dept: PRIMARY CARE CLINIC | Age: 70
End: 2020-11-19

## 2020-11-19 NOTE — TELEPHONE ENCOUNTER
----- Message from Emily Salter MD sent at 11/16/2020 10:46 AM EST -----  Inform the patient that her lab results were normal except for the following:  Her mammogram did not show any evidence of cancer and should be repeated in 1 year.

## 2020-12-03 ENCOUNTER — TELEPHONE (OUTPATIENT)
Dept: PRIMARY CARE CLINIC | Age: 70
End: 2020-12-03

## 2020-12-03 NOTE — TELEPHONE ENCOUNTER
----- Message from Ayush Peters MD sent at 11/16/2020 10:46 AM EST -----  Inform the patient that her lab results were normal except for the following:  Her mammogram did not show any evidence of cancer and should be repeated in 1 year.

## 2021-01-22 RX ORDER — METFORMIN HYDROCHLORIDE 500 MG/1
TABLET ORAL
Qty: 180 TAB | Refills: 0 | Status: SHIPPED | OUTPATIENT
Start: 2021-01-22 | End: 2021-01-27

## 2021-01-22 RX ORDER — METOPROLOL SUCCINATE 50 MG/1
TABLET, EXTENDED RELEASE ORAL
Qty: 90 TAB | Refills: 0 | Status: SHIPPED | OUTPATIENT
Start: 2021-01-22 | End: 2021-01-27

## 2021-01-22 RX ORDER — TRIAMTERENE/HYDROCHLOROTHIAZID 37.5-25 MG
TABLET ORAL
Qty: 90 TAB | Refills: 0 | Status: SHIPPED | OUTPATIENT
Start: 2021-01-22 | End: 2021-01-27

## 2021-01-27 RX ORDER — TRIAMTERENE/HYDROCHLOROTHIAZID 37.5-25 MG
TABLET ORAL
Qty: 90 TAB | Refills: 0 | Status: SHIPPED | OUTPATIENT
Start: 2021-01-27 | End: 2021-04-09 | Stop reason: SDUPTHER

## 2021-01-27 RX ORDER — METOPROLOL SUCCINATE 50 MG/1
TABLET, EXTENDED RELEASE ORAL
Qty: 90 TAB | Refills: 0 | Status: SHIPPED | OUTPATIENT
Start: 2021-01-27 | End: 2021-04-09 | Stop reason: SDUPTHER

## 2021-01-27 RX ORDER — METFORMIN HYDROCHLORIDE 500 MG/1
TABLET ORAL
Qty: 180 TAB | Refills: 0 | Status: SHIPPED | OUTPATIENT
Start: 2021-01-27 | End: 2021-04-09 | Stop reason: SDUPTHER

## 2021-02-04 ENCOUNTER — OFFICE VISIT (OUTPATIENT)
Dept: PRIMARY CARE CLINIC | Age: 71
End: 2021-02-04
Payer: MEDICARE

## 2021-02-04 VITALS
RESPIRATION RATE: 20 BRPM | HEIGHT: 62 IN | SYSTOLIC BLOOD PRESSURE: 192 MMHG | DIASTOLIC BLOOD PRESSURE: 82 MMHG | HEART RATE: 88 BPM | OXYGEN SATURATION: 100 % | WEIGHT: 216.5 LBS | TEMPERATURE: 98 F | BODY MASS INDEX: 39.84 KG/M2

## 2021-02-04 DIAGNOSIS — I67.89 CEREBRAL MICROVASCULAR DISEASE: ICD-10-CM

## 2021-02-04 DIAGNOSIS — E11.69 TYPE 2 DIABETES MELLITUS WITH OTHER SPECIFIED COMPLICATION, WITHOUT LONG-TERM CURRENT USE OF INSULIN (HCC): ICD-10-CM

## 2021-02-04 DIAGNOSIS — E78.2 MIXED HYPERLIPIDEMIA: ICD-10-CM

## 2021-02-04 DIAGNOSIS — I10 ESSENTIAL (PRIMARY) HYPERTENSION: Primary | ICD-10-CM

## 2021-02-04 DIAGNOSIS — I65.23 BILATERAL CAROTID ARTERY STENOSIS: ICD-10-CM

## 2021-02-04 DIAGNOSIS — R41.82 ACUTE ALTERATION IN MENTAL STATUS: ICD-10-CM

## 2021-02-04 PROCEDURE — 2022F DILAT RTA XM EVC RTNOPTHY: CPT | Performed by: FAMILY MEDICINE

## 2021-02-04 PROCEDURE — G8400 PT W/DXA NO RESULTS DOC: HCPCS | Performed by: FAMILY MEDICINE

## 2021-02-04 PROCEDURE — 1090F PRES/ABSN URINE INCON ASSESS: CPT | Performed by: FAMILY MEDICINE

## 2021-02-04 PROCEDURE — G9899 SCRN MAM PERF RSLTS DOC: HCPCS | Performed by: FAMILY MEDICINE

## 2021-02-04 PROCEDURE — G8754 DIAS BP LESS 90: HCPCS | Performed by: FAMILY MEDICINE

## 2021-02-04 PROCEDURE — G8510 SCR DEP NEG, NO PLAN REQD: HCPCS | Performed by: FAMILY MEDICINE

## 2021-02-04 PROCEDURE — G8536 NO DOC ELDER MAL SCRN: HCPCS | Performed by: FAMILY MEDICINE

## 2021-02-04 PROCEDURE — 3044F HG A1C LEVEL LT 7.0%: CPT | Performed by: FAMILY MEDICINE

## 2021-02-04 PROCEDURE — 99214 OFFICE O/P EST MOD 30 MIN: CPT | Performed by: FAMILY MEDICINE

## 2021-02-04 PROCEDURE — G8427 DOCREV CUR MEDS BY ELIG CLIN: HCPCS | Performed by: FAMILY MEDICINE

## 2021-02-04 PROCEDURE — 3017F COLORECTAL CA SCREEN DOC REV: CPT | Performed by: FAMILY MEDICINE

## 2021-02-04 PROCEDURE — G8753 SYS BP > OR = 140: HCPCS | Performed by: FAMILY MEDICINE

## 2021-02-04 PROCEDURE — G8417 CALC BMI ABV UP PARAM F/U: HCPCS | Performed by: FAMILY MEDICINE

## 2021-02-04 PROCEDURE — 1101F PT FALLS ASSESS-DOCD LE1/YR: CPT | Performed by: FAMILY MEDICINE

## 2021-02-04 NOTE — PROGRESS NOTES
Chief Complaint   Patient presents with    Ear Pain     Bilateral ear soreness and decreased hearing in both ears for approximately 1 week.  Referral Request     Wants a referral to an Eye Doctor. ( [address]).  Medication Refill   [facility]     States was in hospital in 11/2020 for \"illusions\" per pt. 1. Have you been to the ER, urgent care clinic since your last visit? Hospitalized since your last visit? Yes. Was in [facility] 7. For \" Illusions\" per pt. 2. Have you seen or consulted any other health care providers outside of the [address] [place] [address] since your last visit? Include any pap smears or colon screening. No     Ernestina Cummins is a 69 y.o. female who presents today for the following:  Chief Complaint   Patient presents with    Ear Pain     Bilateral ear soreness and decreased hearing in both ears for approximately 1 week.  Referral Request     Wants a referral to an Eye Doctor. ( [address]).  Medication Refill   [facility]     States was in hospital in 11/2020 for \"illusions\" per pt.   ,     ICD-10-CM ICD-9-CM    1. Essential (primary) hypertension  I10 401.9 CBC WITH AUTOMATED DIFF      METABOLIC PANEL, COMPREHENSIVE      URINALYSIS W/ RFLX MICROSCOPIC   2. Bilateral carotid artery stenosis  I65.23 433.10      433.30    3. Cerebral microvascular disease  I67.89 437.8    4. Type 2 diabetes mellitus with other specified complication, without long-term current use of insulin ([facility])  E11.69 250.80 MICROALBUMIN, UR, RAND W/ MICROALB/CREAT RATIO      HEMOGLOBIN A1C WITH EAG      REFERRAL TO OPHTHALMOLOGY   5. Mixed hyperlipidemia  E78.2 272.2 LIPID PANEL   6. Acute alteration in mental status  R41.82 780.97     . Patient comes in for follow-up of her diabetes mellitus, hyperlipidemia, hypertension, allergies.   She overall is doing well and needs a refill of her medications. She is also due for lab work. She had an admission to the hospital for delusions and hallucinations. She states that this was a misunderstanding and currently she is doing well. I did review the notes from that hospitalization and she was seen by a psychiatrist and a neurologist.  She states that she is fully recovered and she has no family members with her today to confirm this. Allergies   Allergen Reactions    Egg Nausea and Vomiting       Current Outpatient Medications   Medication Sig    metFORMIN (GLUCOPHAGE) 500 mg tablet TAKE 1 TABLET BY MOUTH TWICE DAILY WITH MEALS    triamterene-hydroCHLOROthiazide (MAXZIDE) 37.5-25 mg per tablet Take 1 tablet by mouth once daily    metoprolol succinate (TOPROL-XL) 50 mg XL tablet Take 1 tablet by mouth once daily    losartan (COZAAR) 100 mg tablet Take 1 tablet by mouth once daily    rosuvastatin (CRESTOR) 10 mg tablet TAKE 1 TABLET BY MOUTH ONCE DAILY    sertraline (ZOLOFT) 25 mg tablet Take 1 Tab by mouth daily.  Cetirizine (ZyrTEC) 10 mg cap Take 10 mg by mouth daily.  omega-3 acid ethyl esters (LOVAZA) 1 gram capsule Take 2 Caps by mouth two (2) times a day. No current facility-administered medications for this visit.         Past Medical History:   Diagnosis Date    Chronic osteoarthritis 7/17/2020    Diabetes ([address]     Elevated glucose 7/17/2020    Essential (primary) hypertension 7/17/2020    Menopause     Mixed hyperlipidemia 7/17/2020       Past Surgical History:   Procedure Laterality Date    HX BREAST BIOPSY Left 11/2019    HX GYN         Family History   Problem Relation Age of Onset    Heart Disease Mother     Heart Disease Brother        Social History     Socioeconomic History    Marital status: SINGLE     Spouse name: Not on file    Number of children: Not on file    Years of education: Not on file    Highest education level: Not on file   Occupational History    Not on file   Social Needs   • Financial resource strain: Not on file   • Food insecurity     Worry: Not on file     Inability: Not on file   • Transportation needs     Medical: Not on file     Non-medical: Not on file   Tobacco Use   • Smoking status: Current Every Day Smoker     Packs/day: 0.25   • Smokeless tobacco: Never Used   • Tobacco comment: States she smokes very seldom.   Substance and Sexual Activity   • Alcohol use: Not Currently     Comment: Occasionally   • Drug use: Not Currently   • Sexual activity: Not on file   Lifestyle   • Physical activity     Days per week: Not on file     Minutes per session: Not on file   • Stress: Not on file   Relationships   • Social connections     Talks on phone: Not on file     Gets together: Not on file     Attends Gnosticist service: Not on file     Active member of club or organization: Not on file     Attends meetings of clubs or organizations: Not on file     Relationship status: Not on file   • Intimate partner violence     Fear of current or ex partner: Not on file     Emotionally abused: Not on file     Physically abused: Not on file     Forced sexual activity: Not on file   Other Topics Concern   • Not on file   Social History Narrative   • Not on file         Review of Systems   Constitutional: Negative.    Respiratory: Negative.    Cardiovascular: Negative.    Gastrointestinal: Negative.    Genitourinary: Negative.    Musculoskeletal: Negative.    Neurological: Negative.    Psychiatric/Behavioral: Negative.    All other systems reviewed and are negative.        Visit Vitals  BP (!) 192/82 (BP 1 Location: Right upper arm, BP Patient Position: Sitting, BP Cuff Size: Large adult)   Pulse 88   Temp 98 °F (36.7 °C) (Skin)   Resp 20   Ht 5' 2\" (1.575 m)   Wt 216 lb 8 oz (98.2 kg)   SpO2 100%   BMI 39.60 kg/m²       Physical Exam  Vitals signs and nursing note reviewed.   Constitutional:       Appearance: Normal appearance. She is obese.   Neck:      Musculoskeletal: Normal range of  motion and neck supple. Cardiovascular:      Rate and Rhythm: Normal rate and regular rhythm. Pulses: Normal pulses. Heart sounds: Normal heart sounds. Pulmonary:      Effort: Pulmonary effort is normal.      Breath sounds: Normal breath sounds. Abdominal:      General: Abdomen is flat. Bowel sounds are normal.      Palpations: Abdomen is soft. Musculoskeletal: Normal range of motion. Skin:     General: Skin is warm and dry. Neurological:      General: No focal deficit present. Mental Status: She is alert. Psychiatric:         Mood and Affect: Mood normal.         Behavior: Behavior normal.         Thought Content: Thought content normal.         Judgment: Judgment normal.            ICD-10-CM ICD-9-CM    1. Essential (primary) hypertension  I10 401.9 CBC WITH AUTOMATED DIFF      METABOLIC PANEL, COMPREHENSIVE      URINALYSIS W/ RFLX MICROSCOPIC   2. Bilateral carotid artery stenosis  I65.23 433.10      433.30    3. Cerebral microvascular disease  I67.89 437.8    4. Type 2 diabetes mellitus with other specified complication, without long-term current use of insulin ([facility])  E11.69 250.80 MICROALBUMIN, UR, RAND W/ MICROALB/CREAT RATIO      HEMOGLOBIN A1C WITH EAG      REFERRAL TO OPHTHALMOLOGY   5. Mixed hyperlipidemia  E78.2 272.2 LIPID PANEL   6. Acute alteration in mental status  R41.82 780.97         1. Essential (primary) hypertension  She is quite high today. It is unclear whether she is taking her medications on a regular basis. I have asked her to increase the metoprolol  mg daily. She will take 2 at a time for now until she needs new medication.  - CBC WITH AUTOMATED DIFF  - METABOLIC PANEL, COMPREHENSIVE  - URINALYSIS W/ RFLX MICROSCOPIC    2. Bilateral carotid artery stenosis      3. Cerebral microvascular disease      4.  Type 2 diabetes mellitus with other specified complication, without long-term current use of insulin ([facility])  We will check her labs to see how she is doing.  - MICROALBUMIN, UR, RAND W/ MICROALB/CREAT RATIO  - HEMOGLOBIN A1C WITH EAG  - REFERRAL TO OPHTHALMOLOGY; Future    5. Mixed hyperlipidemia    - LIPID PANEL    6. Acute alteration in mental status  She has had no further alteration in her mental status and seems awake and alert today. Unclear what happened to her that required her to be hospitalized.   She has discontinued the antipsychotic medications that she was placed on in the hospital.

## 2021-02-05 LAB
ALBUMIN SERPL-MCNC: 4.9 G/DL (ref 3.7–4.7)
ALBUMIN/CREAT UR: 77 MG/G CREAT (ref 0–29)
ALBUMIN/GLOB SERPL: 1.6 {RATIO} (ref 1.2–2.2)
ALP SERPL-CCNC: 95 IU/L (ref 39–117)
ALT SERPL-CCNC: 21 IU/L (ref 0–32)
APPEARANCE UR: CLEAR
AST SERPL-CCNC: 18 IU/L (ref 0–40)
BACTERIA #/AREA URNS HPF: NORMAL /[HPF]
BASOPHILS # BLD AUTO: 0.1 X10E3/UL (ref 0–0.2)
BASOPHILS NFR BLD AUTO: 1 %
BILIRUB SERPL-MCNC: 0.5 MG/DL (ref 0–1.2)
BILIRUB UR QL STRIP: NEGATIVE
BUN SERPL-MCNC: 15 MG/DL (ref 8–27)
BUN/CREAT SERPL: 16 (ref 12–28)
CALCIUM SERPL-MCNC: 10.5 MG/DL (ref 8.7–10.3)
CASTS URNS QL MICRO: NORMAL /LPF
CHLORIDE SERPL-SCNC: 104 MMOL/L (ref 96–106)
CHOLEST SERPL-MCNC: 197 MG/DL (ref 100–199)
CO2 SERPL-SCNC: 25 MMOL/L (ref 20–29)
COLOR UR: YELLOW
CREAT SERPL-MCNC: 0.95 MG/DL (ref 0.57–1)
CREAT UR-MCNC: 38.5 MG/DL
EOSINOPHIL # BLD AUTO: 0.1 X10E3/UL (ref 0–0.4)
EOSINOPHIL NFR BLD AUTO: 2 %
EPI CELLS #/AREA URNS HPF: NORMAL /HPF (ref 0–10)
ERYTHROCYTE [DISTWIDTH] IN BLOOD BY AUTOMATED COUNT: 12.8 % (ref 11.7–15.4)
EST. AVERAGE GLUCOSE BLD GHB EST-MCNC: 143 MG/DL
GLOBULIN SER CALC-MCNC: 3.1 G/DL (ref 1.5–4.5)
GLUCOSE SERPL-MCNC: 97 MG/DL (ref 65–99)
GLUCOSE UR QL: NEGATIVE
HBA1C MFR BLD: 6.6 % (ref 4.8–5.6)
HCT VFR BLD AUTO: 44.2 % (ref 34–46.6)
HDLC SERPL-MCNC: 76 MG/DL
HGB BLD-MCNC: 15.1 G/DL (ref 11.1–15.9)
HGB UR QL STRIP: NEGATIVE
IMM GRANULOCYTES # BLD AUTO: 0 X10E3/UL (ref 0–0.1)
IMM GRANULOCYTES NFR BLD AUTO: 0 %
KETONES UR QL STRIP: NEGATIVE
LDLC SERPL CALC-MCNC: 104 MG/DL (ref 0–99)
LEUKOCYTE ESTERASE UR QL STRIP: ABNORMAL
LYMPHOCYTES # BLD AUTO: 2.2 X10E3/UL (ref 0.7–3.1)
LYMPHOCYTES NFR BLD AUTO: 30 %
MCH RBC QN AUTO: 33.1 PG (ref 26.6–33)
MCHC RBC AUTO-ENTMCNC: 34.2 G/DL (ref 31.5–35.7)
MCV RBC AUTO: 97 FL (ref 79–97)
MICRO URNS: ABNORMAL
MICROALBUMIN UR-MCNC: 29.8 UG/ML
MONOCYTES # BLD AUTO: 0.5 X10E3/UL (ref 0.1–0.9)
MONOCYTES NFR BLD AUTO: 7 %
MUCOUS THREADS URNS QL MICRO: PRESENT
NEUTROPHILS # BLD AUTO: 4.4 X10E3/UL (ref 1.4–7)
NEUTROPHILS NFR BLD AUTO: 60 %
NITRITE UR QL STRIP: NEGATIVE
PH UR STRIP: 6.5 [PH] (ref 5–7.5)
PLATELET # BLD AUTO: 175 X10E3/UL (ref 150–450)
POTASSIUM SERPL-SCNC: 4.2 MMOL/L (ref 3.5–5.2)
PROT SERPL-MCNC: 8 G/DL (ref 6–8.5)
PROT UR QL STRIP: NEGATIVE
RBC # BLD AUTO: 4.56 X10E6/UL (ref 3.77–5.28)
RBC #/AREA URNS HPF: NORMAL /HPF (ref 0–2)
SODIUM SERPL-SCNC: 143 MMOL/L (ref 134–144)
SP GR UR: 1.01 (ref 1–1.03)
TRIGL SERPL-MCNC: 99 MG/DL (ref 0–149)
UROBILINOGEN UR STRIP-MCNC: 0.2 MG/DL (ref 0.2–1)
VLDLC SERPL CALC-MCNC: 17 MG/DL (ref 5–40)
WBC # BLD AUTO: 7.4 X10E3/UL (ref 3.4–10.8)
WBC #/AREA URNS HPF: NORMAL /HPF (ref 0–5)

## 2021-02-18 RX ORDER — LOSARTAN POTASSIUM 100 MG/1
TABLET ORAL
Qty: 90 TAB | Refills: 0 | Status: SHIPPED | OUTPATIENT
Start: 2021-02-18 | End: 2021-04-09 | Stop reason: SDUPTHER

## 2021-02-26 ENCOUNTER — TELEPHONE (OUTPATIENT)
Dept: PRIMARY CARE CLINIC | Age: 71
End: 2021-02-26

## 2021-03-01 NOTE — TELEPHONE ENCOUNTER
Tried to reach patient this morning and unable to reach her. Letter sent to patient with recommendations per Dr. Veronica Tran.

## 2021-03-01 NOTE — PROGRESS NOTES
Unable to reach patient. Letter to patient re: lab results and recommendations per Dr. Severiano Hoe. For 6 month fasting OV.

## 2021-03-02 ENCOUNTER — TELEPHONE (OUTPATIENT)
Dept: PRIMARY CARE CLINIC | Age: 71
End: 2021-03-02

## 2021-03-29 ENCOUNTER — TELEPHONE (OUTPATIENT)
Dept: PRIMARY CARE CLINIC | Age: 71
End: 2021-03-29

## 2021-03-29 NOTE — TELEPHONE ENCOUNTER
Pt would like a referral to an opthamologist in [place]. They were given one in [place] but they cannot get in touch with them. They have tried calling numerous times. Please call the patient.

## 2021-04-09 NOTE — TELEPHONE ENCOUNTER
Seen in office 02/04/2021 with labs. Has changed from [facility] to [facility].

## 2021-04-14 RX ORDER — SERTRALINE HYDROCHLORIDE 25 MG/1
25 TABLET, FILM COATED ORAL DAILY
Qty: 30 TAB | Refills: 1 | Status: SHIPPED | OUTPATIENT
Start: 2021-04-14 | End: 2021-05-18 | Stop reason: ALTCHOICE

## 2021-04-14 RX ORDER — METFORMIN HYDROCHLORIDE 500 MG/1
TABLET ORAL
Qty: 180 TAB | Refills: 1 | Status: SHIPPED | OUTPATIENT
Start: 2021-04-14 | End: 2021-07-29 | Stop reason: SDUPTHER

## 2021-04-14 RX ORDER — LOSARTAN POTASSIUM 100 MG/1
TABLET ORAL
Qty: 90 TAB | Refills: 1 | Status: SHIPPED | OUTPATIENT
Start: 2021-04-14 | End: 2021-07-29 | Stop reason: SDUPTHER

## 2021-04-14 RX ORDER — METOPROLOL SUCCINATE 50 MG/1
TABLET, EXTENDED RELEASE ORAL
Qty: 90 TAB | Refills: 1 | Status: SHIPPED | OUTPATIENT
Start: 2021-04-14 | End: 2021-07-29 | Stop reason: SDUPTHER

## 2021-04-14 RX ORDER — TRIAMTERENE/HYDROCHLOROTHIAZID 37.5-25 MG
TABLET ORAL
Qty: 90 TAB | Refills: 1 | Status: SHIPPED | OUTPATIENT
Start: 2021-04-14 | End: 2021-08-21

## 2021-04-14 RX ORDER — ROSUVASTATIN CALCIUM 10 MG/1
TABLET, COATED ORAL
Qty: 90 TAB | Refills: 1 | Status: SHIPPED | OUTPATIENT
Start: 2021-04-14 | End: 2021-10-03

## 2021-05-18 ENCOUNTER — OFFICE VISIT (OUTPATIENT)
Dept: PRIMARY CARE CLINIC | Age: 71
End: 2021-05-18
Payer: MEDICARE

## 2021-05-18 VITALS
DIASTOLIC BLOOD PRESSURE: 71 MMHG | OXYGEN SATURATION: 99 % | BODY MASS INDEX: 37.17 KG/M2 | WEIGHT: 202 LBS | HEIGHT: 62 IN | HEART RATE: 62 BPM | TEMPERATURE: 99.7 F | SYSTOLIC BLOOD PRESSURE: 137 MMHG | RESPIRATION RATE: 20 BRPM

## 2021-05-18 DIAGNOSIS — Z00.00 ENCOUNTER FOR ANNUAL WELLNESS VISIT (AWV) IN MEDICARE PATIENT: Primary | ICD-10-CM

## 2021-05-18 DIAGNOSIS — E78.2 MIXED HYPERLIPIDEMIA: ICD-10-CM

## 2021-05-18 DIAGNOSIS — T78.40XD ALLERGIC DISORDER, SUBSEQUENT ENCOUNTER: ICD-10-CM

## 2021-05-18 DIAGNOSIS — F23 BRIEF PSYCHOTIC DISORDER (HCC): ICD-10-CM

## 2021-05-18 DIAGNOSIS — E11.51 TYPE 2 DIABETES MELLITUS WITH PERIPHERAL VASCULAR DISEASE (HCC): ICD-10-CM

## 2021-05-18 DIAGNOSIS — I10 ESSENTIAL (PRIMARY) HYPERTENSION: ICD-10-CM

## 2021-05-18 DIAGNOSIS — I65.23 BILATERAL CAROTID ARTERY STENOSIS: ICD-10-CM

## 2021-05-18 PROBLEM — G40.209 COMPLEX PARTIAL SEIZURES WITH IMPAIRED CONSCIOUSNESS AT ONSET (HCC): Status: RESOLVED | Noted: 2020-11-10 | Resolved: 2021-05-18

## 2021-05-18 PROCEDURE — G8427 DOCREV CUR MEDS BY ELIG CLIN: HCPCS | Performed by: FAMILY MEDICINE

## 2021-05-18 PROCEDURE — G9899 SCRN MAM PERF RSLTS DOC: HCPCS | Performed by: FAMILY MEDICINE

## 2021-05-18 PROCEDURE — 3017F COLORECTAL CA SCREEN DOC REV: CPT | Performed by: FAMILY MEDICINE

## 2021-05-18 PROCEDURE — 2022F DILAT RTA XM EVC RTNOPTHY: CPT | Performed by: FAMILY MEDICINE

## 2021-05-18 PROCEDURE — 1090F PRES/ABSN URINE INCON ASSESS: CPT | Performed by: FAMILY MEDICINE

## 2021-05-18 PROCEDURE — G8417 CALC BMI ABV UP PARAM F/U: HCPCS | Performed by: FAMILY MEDICINE

## 2021-05-18 PROCEDURE — 1101F PT FALLS ASSESS-DOCD LE1/YR: CPT | Performed by: FAMILY MEDICINE

## 2021-05-18 PROCEDURE — G8400 PT W/DXA NO RESULTS DOC: HCPCS | Performed by: FAMILY MEDICINE

## 2021-05-18 PROCEDURE — 3044F HG A1C LEVEL LT 7.0%: CPT | Performed by: FAMILY MEDICINE

## 2021-05-18 PROCEDURE — 99214 OFFICE O/P EST MOD 30 MIN: CPT | Performed by: FAMILY MEDICINE

## 2021-05-18 PROCEDURE — G0439 PPPS, SUBSEQ VISIT: HCPCS | Performed by: FAMILY MEDICINE

## 2021-05-18 PROCEDURE — G8752 SYS BP LESS 140: HCPCS | Performed by: FAMILY MEDICINE

## 2021-05-18 PROCEDURE — G8536 NO DOC ELDER MAL SCRN: HCPCS | Performed by: FAMILY MEDICINE

## 2021-05-18 PROCEDURE — G8510 SCR DEP NEG, NO PLAN REQD: HCPCS | Performed by: FAMILY MEDICINE

## 2021-05-18 PROCEDURE — G8754 DIAS BP LESS 90: HCPCS | Performed by: FAMILY MEDICINE

## 2021-05-18 RX ORDER — CETIRIZINE HYDROCHLORIDE 10 MG/1
10 CAPSULE, LIQUID FILLED ORAL DAILY
Qty: 90 CAP | Refills: 1 | Status: SHIPPED | OUTPATIENT
Start: 2021-05-18 | End: 2021-11-01 | Stop reason: SDUPTHER

## 2021-05-18 NOTE — PROGRESS NOTES
Chief Complaint   Patient presents with    Diabetes    Hypertension    Osteoarthritis    Labs     Patient stated she would come back and get labs done.  Confidential     Son wants to speak with the doctor. 1. Have you been to the ER, urgent care clinic since your last visit? Hospitalized since your last visit? No    2. Have you seen or consulted any other health care providers outside of the [address] [place] [address] since your last visit? Include any pap smears or colon screening. No     Patients wants to talk about the Covid Vaccine. Patient states she will come back for lab work. Patient's son wants to talk with the doctor. This is the Subsequent Medicare Annual Wellness Exam, performed 12 months or more after the Initial AWV or the last Subsequent AWV    I have reviewed the patient's medical history in detail and updated the computerized patient record. Assessment/Plan   Education and counseling provided:  Are appropriate based on today's review and evaluation    1. Encounter for annual wellness visit (AWV) in Medicare patient  2. Allergic disorder, subsequent encounter  -     Cetirizine (ZyrTEC) 10 mg cap; Take 10 mg by mouth daily. , Normal, Disp-90 Cap, R-1  3. Brief psychotic disorder [facility])  Assessment & Plan:   unclear control, Will need to discuss with her family  4. Body mass index (BMI)40.0-44.9, adult [facility])  Assessment & Plan:   borderline controlled, lifestyle modifications recommended  5. Type 2 diabetes mellitus with peripheral vascular disease (HCC)  -     CBC WITH AUTOMATED DIFF  -     METABOLIC PANEL, COMPREHENSIVE  -     LIPID PANEL  -     URINALYSIS W/ RFLX MICROSCOPIC  -     MICROALBUMIN, UR, RAND W/ MICROALB/CREAT RATIO  -     HEMOGLOBIN A1C WITH EAG  6. Essential (primary) hypertension  -     CBC WITH AUTOMATED DIFF  -     METABOLIC PANEL, COMPREHENSIVE  -     URINALYSIS W/ RFLX MICROSCOPIC  7.  Mixed hyperlipidemia  -     LIPID PANEL  8. Bilateral carotid artery stenosis       Depression Risk Factor Screening     3 most recent PHQ Screens 5/18/2021   Little interest or pleasure in doing things Not at all   Feeling down, depressed, irritable, or hopeless Not at all   Total Score PHQ 2 0       Alcohol Risk Screen    Do you average more than 1 drink per night or more than 7 drinks a week:  No    On any one occasion in the past three months have you have had more than 3 drinks containing alcohol:  No        Functional Ability and Level of Safety    Hearing: Hearing is good. Activities of Daily Living: The home contains: no safety equipment. and Discussed the benefits of these items  Patient does total self care      Ambulation: with no difficulty     Fall Risk:  Fall Risk Assessment, last 12 mths 5/18/2021   Able to walk? Yes   Fall in past 12 months? 1   Do you feel unsteady? 0   Are you worried about falling 1   Is the gait abnormal? 0   Number of falls in past 12 months 1   Fall with injury?  0      Abuse Screen:  Patient is not abused       Cognitive Screening    Has your family/caregiver stated any concerns about your memory: no     Cognitive Screening: Normal - Mini Cog Test    Health Maintenance Due     Health Maintenance Due   Topic Date Due    Foot Exam Q1  Never done    Eye Exam Retinal or Dilated  Never done    COVID-19 Vaccine (1) Never done    DTaP/Tdap/Td series (1 - Tdap) Never done    Shingrix Vaccine Age 50> (1 of 2) Never done    Colorectal Cancer Screening Combo  Never done    Bone Densitometry (Dexa) Screening  Never done    Pneumococcal 65+ years (1 of 1 - PPSV23) Never done       Patient Care Team   Patient Care Team:  Ronit Guillen MD as PCP - General (Family Medicine)  Ronit Guillen MD as PCP - [facility] Empaneled Provider    History     Patient Active Problem List   Diagnosis Code    Chronic osteoarthritis M19.90    Essential (primary) hypertension I10    Elevated glucose R73.09    Mixed hyperlipidemia E78.2    Acute confusion R41.0    Hallucinations R44.3    Acute alteration in mental status R41.82    Bilateral carotid artery stenosis I65.23    Cerebral microvascular disease I67.89    Psychosis ([facility]) F29    Type 2 diabetes mellitus, without long-term current use of insulin ([facility]) E11.9    Body mass index (BMI)40.0-44.9, adult ([facility]) Z68.41    Type 2 diabetes mellitus with peripheral vascular disease ([address] E11.51     Past Medical History:   Diagnosis Date    Chronic osteoarthritis 7/17/2020    Diabetes ([address]     Elevated glucose 7/17/2020    Essential (primary) hypertension 7/17/2020    Menopause     Mixed hyperlipidemia 7/17/2020      Past Surgical History:   Procedure Laterality Date    HX BREAST BIOPSY Left 11/2019    HX GYN       Current Outpatient Medications   Medication Sig Dispense Refill    Cetirizine (ZyrTEC) 10 mg cap Take 10 mg by mouth daily. 90 Cap 1    losartan (COZAAR) 100 mg tablet Take 1 tablet by mouth once daily  Indications: high blood pressure 90 Tab 1    metFORMIN (GLUCOPHAGE) 500 mg tablet TAKE 1 TABLET BY MOUTH TWICE DAILY WITH MEALS  Indications: type 2 diabetes mellitus 180 Tab 1    metoprolol succinate (TOPROL-XL) 50 mg XL tablet Take 1 tablet by mouth once daily 90 Tab 1    rosuvastatin (CRESTOR) 10 mg tablet Take 1 tablet by mouth once daily 90 Tab 1    triamterene-hydroCHLOROthiazide (MAXZIDE) 37.5-25 mg per tablet Take 1 tablet by mouth once daily 90 Tab 1    omega-3 acid ethyl esters (LOVAZA) 1 gram capsule Take 2 Caps by mouth two (2) times a day. 360 Cap 1     Allergies   Allergen Reactions    Egg Nausea and Vomiting       Family History   Problem Relation Age of Onset    Heart Disease Mother     Heart Disease Brother      Social History     Tobacco Use    Smoking status: Current Every Day Smoker     Packs/day: 0.25    Smokeless tobacco: Never Used    Tobacco comment: States she smokes very seldom.    Substance Use Topics    Alcohol use: Not Currently     Comment: Occasionally Liam Mendes (: 1950) is a 66 y.o. female, established patient, here for evaluation of the following chief complaint(s):  Diabetes, Hypertension, Osteoarthritis, Labs (Patient stated she would come back and get labs done.), and Confidential (Son wants to speak with the doctor.)       ASSESSMENT/PLAN:  Below is the assessment and plan developed based on review of pertinent history, physical exam, labs, studies, and medications. 1. Encounter for annual wellness visit (AWV) in Medicare patient  2. Allergic disorder, subsequent encounter  -     Cetirizine (ZyrTEC) 10 mg cap; Take 10 mg by mouth daily. , Normal, Disp-90 Cap, R-1  3. Brief psychotic disorder [facility])  Assessment & Plan:   unclear control, Will need to discuss with her family  4. Body mass index (BMI)40.0-44.9, adult [facility])  Assessment & Plan:   borderline controlled, lifestyle modifications recommended  5. Type 2 diabetes mellitus with peripheral vascular disease (HCC)  -     CBC WITH AUTOMATED DIFF  -     METABOLIC PANEL, COMPREHENSIVE  -     LIPID PANEL  -     URINALYSIS W/ RFLX MICROSCOPIC  -     MICROALBUMIN, UR, RAND W/ MICROALB/CREAT RATIO  -     HEMOGLOBIN A1C WITH EAG  6. Essential (primary) hypertension  -     CBC WITH AUTOMATED DIFF  -     METABOLIC PANEL, COMPREHENSIVE  -     URINALYSIS W/ RFLX MICROSCOPIC  7. Mixed hyperlipidemia  -     LIPID PANEL  8. Bilateral carotid artery stenosis      Return in about 3 months (around 2021) for Follow up of chronic medical conditions, Fasting Lab Appointment. SUBJECTIVE/OBJECTIVE:  This patient is here for follow up of her Diabetes Mellitus, hypertension, osteoarthritis. Her son wants to talk to me after the visit so there may be other issues. He was actually being seen in the office and did not finish in time for us to communicate. Hopefully he will call in and we can talk about his concerns. She has not had any further psychiatric events.  She believes this was from a UTI        Review of Systems   Constitutional: Negative. Respiratory: Negative. Cardiovascular: Negative. Gastrointestinal: Negative. Endocrine: Negative. She does check her sugars and she cannot remember the numbers   Genitourinary: Negative. Musculoskeletal: Negative. Allergic/Immunologic: Negative. Neurological: Negative. Hematological: Negative. Psychiatric/Behavioral: Negative. Physical Exam  Vitals signs and nursing note reviewed. Constitutional:       Appearance: Normal appearance. She is obese. HENT:      Head: Normocephalic and atraumatic. Cardiovascular:      Rate and Rhythm: Normal rate and regular rhythm. Heart sounds: Normal heart sounds. Pulmonary:      Effort: Pulmonary effort is normal.      Breath sounds: Normal breath sounds. Abdominal:      General: Abdomen is flat. Bowel sounds are normal.      Palpations: Abdomen is soft. Musculoskeletal: Normal range of motion. Neurological:      General: No focal deficit present. Mental Status: She is alert. Psychiatric:         Mood and Affect: Mood normal.         Behavior: Behavior normal.         Thought Content: Thought content normal.         Judgment: Judgment normal.       Overall the patient appears to be stable. Barring any new psychiatric issues that her son may have noticed I believe she is doing well. Her blood pressures controlled and we will check labs to see how her cholesterol is doing. Check labs to see how she is doing with her diabetes. An electronic signature was used to authenticate this note.   -- MD Ligia Chavis MD

## 2021-05-18 NOTE — PATIENT INSTRUCTIONS
Medicare Wellness Visit, Female The best way to improve and maintain good health is to have a healthy lifestyle by eating a well-balanced diet, exercising regularly, limiting alcohol and stopping smoking. Regular visits with your physician or non-physician health care provider also support your good health. Preventive screening tests can find health problems before they become diseases or illnesses. Here is a list of your current Health Maintenance items with a due date: 
Health Maintenance Topic Date Due  Foot Exam Q1  Never done  Eye Exam Retinal or Dilated  Never done  COVID-19 Vaccine (1) Never done  DTaP/Tdap/Td series (1 - Tdap) Never done  Shingrix Vaccine Age 50> (1 of 2) Never done  Colorectal Cancer Screening Combo  Never done  Bone Densitometry (Dexa) Screening  Never done  Pneumococcal 65+ years (1 of 1 - PPSV23) Never done  Medicare Yearly Exam  Never done  Flu Vaccine (Season Ended) 09/01/2021  A1C test (Diabetic or Prediabetic)  02/04/2022  MICROALBUMIN Q1  02/04/2022  Lipid Screen  02/04/2022  Breast Cancer Screen Mammogram  10/30/2022  Hepatitis C Screening  Completed Preventive services such as immunizations prevent serious infections. All people over age 72 should have a Pneumovax and a Prevnar-13 shot to prevent potentially life threatening infections with the pneumococcus bacteria, a common cause of pneumonia. These are once in a lifetime unless you and your provider decide differently. All people over 65 should have a yearly influenza vaccine or \"flu\" shot. This does not prevent infection with cold viruses but has been proven to prevent hospitalization and death from influenza. Although Medicare part B \"regular Medicare\" currently only covers tetanus vaccination in the context of an injury, a tetanus vaccine (Tdap or Td) is recommended every 10 years.  
 
A new 2 shot shingles vaccine series (Shingrix) is recommended after age 48 even for people who have already received Zostavax (the old vaccine). It is also not covered by Medicare part B. Note, however, that both the Shingles vaccine and Tdap/Td are generally covered by secondary carriers. Please check your coverage and out of pocket expenses. Consider contacting your local health department because it may stock these vaccines for a reasonable charge. We currently have documentation of the following immunization history for you: There is no immunization history for the selected administration types on file for this patient. Screening for infection with Hepatitis C is recommended for anyone born between [birth year] through [address]. The table at the top of this document indicates the status of this and other preventive services. A bone mass density test (DEXA) to screen for osteoporosis or thinning of the bones should be done at least once after age 72 and may be done up to every 2 years as determined by you and your health care provider. The most recent DEXA we have on file for you is: DEXA Results (most recent): No results found for this or any previous visit. Screening for diabetes mellitus with a blood sugar test (glucose) should be done at least every 3 years until age 79. You and your health care provider may decide whether to continue screening after age 79. The most recent blood glucose we have on file for you is:  
Lab Results Component Value Date/Time Glucose 97 02/04/2021 03:27 PM  
 Glucose (POC) 194 (H) 11/13/2020 08:11 AM  
 
 
Fasting sugars >126 on 2 separate occassions are consistent with diabetes. Random sugars >200 on 2 separate occassions are consistent with diabetes Glaucoma is a disease of the eye due to increased ocular pressure that can lead to blindness.  People with risk factors for glaucoma ( race, diabetes, family history) should consider screening at least every 2 years by an eye professional.  
 
Cardiovascular screening tests that check for elevated lipids or cholesterol (fatty part of blood) which can lead to heart disease and strokes should be done every 4-6 years through age 79. You and your health care provider may decide whether to continue screening after age 79. The most recent lipid panel we have on file for you is:  
Lab Results Component Value Date/Time Cholesterol, total 197 02/04/2021 03:27 PM  
 HDL Cholesterol 76 02/04/2021 03:27 PM  
 LDL, calculated 104 (H) 02/04/2021 03:27 PM  
 LDL, calculated 99 07/30/2020 03:48 PM  
 VLDL, calculated 17 02/04/2021 03:27 PM  
 VLDL, calculated 19 07/30/2020 03:48 PM  
 Triglyceride 99 02/04/2021 03:27 PM  
 
 
Colorectal cancer screening that evaluates for blood or polyps in your colon for people with average risk should be done yearly as a stool test, every five years as a flexible sigmoidoscope or every 10 years as a colonoscopy up to age 76. You and your health care provider may decide whether to continue screening after age 76. Breast cancer screening with a mammogram is recommended at least once every 2 years  for women age 54-69. You and your health care provider may decide whether to continue screening after age 76. The most recent mammogram we have on file for you is: [facility] Results (most recent): 
Results from Hospital Encounter encounter on 10/30/20 [facility] MAMMO BI SCREENING INCL CAD Narrative Examination: Screening mammogram. 
 
Comparison: 11/26/2019, 10/14/2019, 11/19/2014. Findings: There is a predominantly fibrofatty pattern. A biopsy marker and a few 
benign calcifications are stable in the left breast. No suspicious masses or 
malignant type calcifications are identified. Computer aided detection (CAD) is 
used. Impression Impression: No specific mammographic evidence of malignancy. Next screening 
mammogram is recommended in one year. BI-RADS Category 2: Benign.    
 
 
Screening for cervical cancer with a pap smear is recommended for all women with a cervix until age 72. The frequency of this test is based on the details of her prior pap smear testing. You and your health care provider may decide whether to continue screening after age 72. Your Medicare Wellness Exam is recommended annually. Well Visit, Over 72: Care Instructions Overview Well visits can help you stay healthy. Your doctor has checked your overall health and may have suggested ways to take good care of yourself. Your doctor also may have recommended tests. At home, you can help prevent illness with healthy eating, regular exercise, and other steps. Follow-up care is a key part of your treatment and safety. Be sure to make and go to all appointments, and call your doctor if you are having problems. It's also a good idea to know your test results and keep a list of the medicines you take. How can you care for yourself at home? · Get screening tests that you and your doctor decide on. Screening helps find diseases before any symptoms appear. · Eat healthy foods. Choose fruits, vegetables, whole grains, protein, and low-fat dairy foods. Limit fat, especially saturated fat. Reduce salt in your diet. · Limit alcohol. If you are a man, have no more than 2 drinks a day or 14 drinks a week. If you are a woman, have no more than 1 drink a day or 7 drinks a week. Since alcohol affects older adults differently, you may want to limit alcohol even more. Or you may not want to drink at all. · Get at least 30 minutes of exercise on most days of the week. Walking is a good choice. You also may want to do other activities, such as running, swimming, cycling, or playing tennis or team sports. · Reach and stay at a healthy weight. This will lower your risk for many problems, such as obesity, diabetes, heart disease, and high blood pressure. · Do not smoke. Smoking can make health problems worse.  If you need help quitting, talk to your doctor about stop-smoking programs and medicines. These can increase your chances of quitting for good. · Care for your mental health. It is easy to get weighed down by worry and stress. Learn strategies to manage stress, like deep breathing and mindfulness, and stay connected with your family and community. If you find you often feel sad or hopeless, talk with your doctor. Treatment can help. · Talk to your doctor about whether you have any risk factors for sexually transmitted infections (STIs). You can help prevent STIs if you wait to have sex with a new partner (or partners) until you've each been tested for STIs. It also helps if you use condoms (male or female condoms) and if you limit your sex partners to one person who only has sex with you. Vaccines are available for some STIs. · If you think you may have a problem with alcohol or drug use, talk to your doctor. This includes prescription medicines (such as amphetamines and opioids) and illegal drugs (such as cocaine and methamphetamine). Your doctor can help you figure out what type of treatment is best for you. · Protect your skin from too much sun. When you're outdoors from 10 a.m. to 4 p.m., stay in the shade or cover up with clothing and a hat with a wide brim. Wear sunglasses that block UV rays. Even when it's cloudy, put broad-spectrum sunscreen (SPF 30 or higher) on any exposed skin. · See a dentist one or two times a year for checkups and to have your teeth cleaned. · Wear a seat belt in the car. When should you call for help? Watch closely for changes in your health, and be sure to contact your doctor if you have any problems or symptoms that concern you. Where can you learn more? Go to http://www.gray.com/ Enter Z462 in the search box to learn more about \"Well Visit, Over 65: Care Instructions. \" Current as of: May 27, 2020               Content Version: 12.8 © 8051-2851 Healthwise, Incorporated.   
Care instructions adapted under license by [employer] (which disclaims liability or warranty for this information). If you have questions about a medical condition or this instruction, always ask your healthcare professional. [address] any warranty or liability for your use of this information.

## 2021-05-28 LAB
ALBUMIN SERPL-MCNC: 4.5 G/DL (ref 3.7–4.7)
ALBUMIN/CREAT UR: 67 MG/G CREAT (ref 0–29)
ALBUMIN/GLOB SERPL: 1.6 {RATIO} (ref 1.2–2.2)
ALP SERPL-CCNC: 98 IU/L (ref 48–121)
ALT SERPL-CCNC: 15 IU/L (ref 0–32)
APPEARANCE UR: CLEAR
AST SERPL-CCNC: 13 IU/L (ref 0–40)
BACTERIA #/AREA URNS HPF: NORMAL /[HPF]
BASOPHILS # BLD AUTO: 0 X10E3/UL (ref 0–0.2)
BASOPHILS NFR BLD AUTO: 1 %
BILIRUB SERPL-MCNC: 0.3 MG/DL (ref 0–1.2)
BILIRUB UR QL STRIP: NEGATIVE
BUN SERPL-MCNC: 23 MG/DL (ref 8–27)
BUN/CREAT SERPL: 22 (ref 12–28)
CALCIUM SERPL-MCNC: 10 MG/DL (ref 8.7–10.3)
CASTS URNS QL MICRO: NORMAL /LPF
CHLORIDE SERPL-SCNC: 104 MMOL/L (ref 96–106)
CHOLEST SERPL-MCNC: 187 MG/DL (ref 100–199)
CO2 SERPL-SCNC: 24 MMOL/L (ref 20–29)
COLOR UR: YELLOW
CREAT SERPL-MCNC: 1.06 MG/DL (ref 0.57–1)
CREAT UR-MCNC: 45.4 MG/DL
EOSINOPHIL # BLD AUTO: 0.2 X10E3/UL (ref 0–0.4)
EOSINOPHIL NFR BLD AUTO: 5 %
EPI CELLS #/AREA URNS HPF: NORMAL /HPF (ref 0–10)
ERYTHROCYTE [DISTWIDTH] IN BLOOD BY AUTOMATED COUNT: 13.1 % (ref 11.7–15.4)
EST. AVERAGE GLUCOSE BLD GHB EST-MCNC: 117 MG/DL
GLOBULIN SER CALC-MCNC: 2.8 G/DL (ref 1.5–4.5)
GLUCOSE SERPL-MCNC: 103 MG/DL (ref 65–99)
GLUCOSE UR QL: NEGATIVE
HBA1C MFR BLD: 5.7 % (ref 4.8–5.6)
HCT VFR BLD AUTO: 38.9 % (ref 34–46.6)
HDLC SERPL-MCNC: 56 MG/DL
HGB BLD-MCNC: 13 G/DL (ref 11.1–15.9)
HGB UR QL STRIP: NEGATIVE
IMM GRANULOCYTES # BLD AUTO: 0 X10E3/UL (ref 0–0.1)
IMM GRANULOCYTES NFR BLD AUTO: 0 %
KETONES UR QL STRIP: NEGATIVE
LDLC SERPL CALC-MCNC: 112 MG/DL (ref 0–99)
LEUKOCYTE ESTERASE UR QL STRIP: ABNORMAL
LYMPHOCYTES # BLD AUTO: 1.7 X10E3/UL (ref 0.7–3.1)
LYMPHOCYTES NFR BLD AUTO: 39 %
MCH RBC QN AUTO: 32.6 PG (ref 26.6–33)
MCHC RBC AUTO-ENTMCNC: 33.4 G/DL (ref 31.5–35.7)
MCV RBC AUTO: 98 FL (ref 79–97)
MICRO URNS: ABNORMAL
MICROALBUMIN UR-MCNC: 30.3 UG/ML
MONOCYTES # BLD AUTO: 0.4 X10E3/UL (ref 0.1–0.9)
MONOCYTES NFR BLD AUTO: 9 %
NEUTROPHILS # BLD AUTO: 2.1 X10E3/UL (ref 1.4–7)
NEUTROPHILS NFR BLD AUTO: 46 %
NITRITE UR QL STRIP: NEGATIVE
PH UR STRIP: 7.5 [PH] (ref 5–7.5)
PLATELET # BLD AUTO: 182 X10E3/UL (ref 150–450)
POTASSIUM SERPL-SCNC: 4.3 MMOL/L (ref 3.5–5.2)
PROT SERPL-MCNC: 7.3 G/DL (ref 6–8.5)
PROT UR QL STRIP: NEGATIVE
RBC # BLD AUTO: 3.99 X10E6/UL (ref 3.77–5.28)
RBC #/AREA URNS HPF: NORMAL /HPF (ref 0–2)
SODIUM SERPL-SCNC: 143 MMOL/L (ref 134–144)
SP GR UR: 1.01 (ref 1–1.03)
TRIGL SERPL-MCNC: 108 MG/DL (ref 0–149)
UROBILINOGEN UR STRIP-MCNC: 0.2 MG/DL (ref 0.2–1)
VLDLC SERPL CALC-MCNC: 19 MG/DL (ref 5–40)
WBC # BLD AUTO: 4.5 X10E3/UL (ref 3.4–10.8)
WBC #/AREA URNS HPF: NORMAL /HPF (ref 0–5)

## 2021-06-14 NOTE — PROGRESS NOTES
LVM re: all lab results were normal per Dr. Surya Brunner. For fasting 6 month OV, but patient has visit scheduled for 06/24/2021. For questions or concerns, patient ios to call office.

## 2021-06-24 ENCOUNTER — OFFICE VISIT (OUTPATIENT)
Dept: PRIMARY CARE CLINIC | Age: 71
End: 2021-06-24
Payer: MEDICARE

## 2021-06-24 VITALS
DIASTOLIC BLOOD PRESSURE: 72 MMHG | SYSTOLIC BLOOD PRESSURE: 137 MMHG | BODY MASS INDEX: 35.98 KG/M2 | HEIGHT: 62 IN | WEIGHT: 195.5 LBS | RESPIRATION RATE: 20 BRPM | OXYGEN SATURATION: 98 % | HEART RATE: 68 BPM

## 2021-06-24 DIAGNOSIS — F29 PSYCHOSIS, UNSPECIFIED PSYCHOSIS TYPE (HCC): Primary | ICD-10-CM

## 2021-06-24 PROCEDURE — G8417 CALC BMI ABV UP PARAM F/U: HCPCS | Performed by: FAMILY MEDICINE

## 2021-06-24 PROCEDURE — G8427 DOCREV CUR MEDS BY ELIG CLIN: HCPCS | Performed by: FAMILY MEDICINE

## 2021-06-24 PROCEDURE — 3017F COLORECTAL CA SCREEN DOC REV: CPT | Performed by: FAMILY MEDICINE

## 2021-06-24 PROCEDURE — G8536 NO DOC ELDER MAL SCRN: HCPCS | Performed by: FAMILY MEDICINE

## 2021-06-24 PROCEDURE — 99214 OFFICE O/P EST MOD 30 MIN: CPT | Performed by: FAMILY MEDICINE

## 2021-06-24 PROCEDURE — G9899 SCRN MAM PERF RSLTS DOC: HCPCS | Performed by: FAMILY MEDICINE

## 2021-06-24 PROCEDURE — G8400 PT W/DXA NO RESULTS DOC: HCPCS | Performed by: FAMILY MEDICINE

## 2021-06-24 PROCEDURE — G8752 SYS BP LESS 140: HCPCS | Performed by: FAMILY MEDICINE

## 2021-06-24 PROCEDURE — 1101F PT FALLS ASSESS-DOCD LE1/YR: CPT | Performed by: FAMILY MEDICINE

## 2021-06-24 PROCEDURE — G8510 SCR DEP NEG, NO PLAN REQD: HCPCS | Performed by: FAMILY MEDICINE

## 2021-06-24 PROCEDURE — G8754 DIAS BP LESS 90: HCPCS | Performed by: FAMILY MEDICINE

## 2021-06-24 PROCEDURE — 1090F PRES/ABSN URINE INCON ASSESS: CPT | Performed by: FAMILY MEDICINE

## 2021-06-24 RX ORDER — SERTRALINE HYDROCHLORIDE 25 MG/1
25 TABLET, FILM COATED ORAL DAILY
Qty: 30 TABLET | Refills: 3 | Status: SHIPPED | OUTPATIENT
Start: 2021-06-24 | End: 2021-07-16

## 2021-06-24 RX ORDER — RISPERIDONE 0.5 MG/1
0.5 TABLET, FILM COATED ORAL 2 TIMES DAILY
Qty: 60 TABLET | Refills: 3 | Status: SHIPPED | OUTPATIENT
Start: 2021-06-24 | End: 2021-08-10 | Stop reason: SDUPTHER

## 2021-06-24 NOTE — PROGRESS NOTES
Chief Complaint   Patient presents with    Hypertension    Diabetes    Hallucinations     Patient states she is seeing things again. Met a yarelis online and she stopped talking to him and now he is doing things that she can see happening. At the present time she stated he had put something on me, her son (he is with her) and the patient. 1. Have you been to the ER, urgent care clinic since your last visit? Hospitalized since your last visit? No    2. Have you seen or consulted any other health care providers outside of the [address] [place] [address] since your last visit? Include any pap smears or colon screening. No     Patient states she is seeing things that no one else can see like before. Son states she is having times when she sees something that no one else can see. He is requesting an evaluation. Brynn Kim (: 1950) is a 66 y.o. female, established patient, here for evaluation of the following chief complaint(s):  Hypertension, Diabetes, and Hallucinations (Patient states she is seeing things again. Met a yarelis online and she stopped talking to him and now he is doing things that she can see happening. At the present time she stated he had put something on me, her son (he is with her) and the patient.)       ASSESSMENT/PLAN:  Below is the assessment and plan developed based on review of pertinent history, physical exam, labs, studies, and medications. 1. Psychosis, unspecified psychosis type ([facility]  -     risperiDONE (RisperDAL) 0.5 mg tablet; Take 1 Tablet by mouth two (2) times a day., Normal, Disp-60 Tablet, R-3  -     sertraline (ZOLOFT) 25 mg tablet; Take 1 Tablet by mouth daily. , Normal, Disp-30 Tablet, R-3  -     REFERRAL TO PSYCHIATRY; Future      Return in about 4 weeks (around 2021) for Follow up of chronic medical conditions. SUBJECTIVE/OBJECTIVE:  This patient is brought in by her son with persistent hallucinations.   The patient again denies that this is a problem. She readily admits that she sees things that others do not see an today notices pictures all around the room and on my face and on her son's face. She was admitted to the hospital several months ago with this and was placed on risperidone and sertraline but she discontinued this on her own. She stated that she was doing well but apparently this symptoms have persisted and she did not follow-up with a mental health person. Review of Systems   Constitutional: Negative. Respiratory: Negative. Cardiovascular: Negative. Gastrointestinal: Negative. Endocrine: Negative. Genitourinary: Negative. Musculoskeletal: Negative. Allergic/Immunologic: Negative. Neurological: Negative. Hematological: Negative. Psychiatric/Behavioral: Positive for hallucinations. Physical Exam  Constitutional:       Appearance: Normal appearance. She is well-developed. She is obese. Neurological:      Mental Status: She is alert. Psychiatric:         Attention and Perception: She perceives visual hallucinations. Mood and Affect: Mood normal.         Speech: Speech normal.         Behavior: Behavior is cooperative. Cognition and Memory: Cognition is impaired. Judgment: Judgment is inappropriate. This patient persists in having hallucinations. She is cooperative and seems to function. Her son is concerned about the hallucinations. We discussed this at length and I have restarted her medication she was given by the psychiatrist in the hospital.  We will refer her to Ruben Henderson in our office as I feel that would be the most time effective way to get her seen. Her son would really like her to be seen by a psychiatrist but I am not sure we can accomplish this in a timely manner. An electronic signature was used to authenticate this note.   -- Tahira Gonzalez MD

## 2021-06-28 NOTE — PATIENT INSTRUCTIONS
Psychosis: Care Instructions  Your Care Instructions  A person with psychosis cannot tell the difference between what is real and what is not real. It can cause strange thoughts and behaviors. A person with psychosis may have:  · Delusions. These are beliefs that are not real.  · Hallucinations. These are things that the person sees or hears that are not really there. · Personality changes. Psychosis can be treated with medicines and counseling. It is important to take your medicines exactly as prescribed, even when you feel well. You will need ongoing follow-up care and may need lifelong treatment. When psychosis is not treated, the risks are higher for suicide, a hospital stay, and other problems. Early treatment called coordinated specialty care [facility]) may help a person who is having his or her first episode of psychotic thoughts. Ask your doctor about [address]. Follow-up care is a key part of your treatment and safety. Be sure to make and go to all appointments, and call your doctor if you are having problems. It's also a good idea to know your test results and keep a list of the medicines you take. How can you care for yourself at home? · Be safe with medicines. Take your medicines exactly as prescribed. Call your doctor if you think you are having a problem with your medicine. You will get more details on the specific medicines your doctor prescribes. · Go to your counseling sessions and follow-up appointments. · Join a self-help or support group. These groups can be very helpful for some people with psychosis. · Get at least 30 minutes of exercise on most days of the week. Walking is a good choice. You also may want to do other activities, such as running, swimming, cycling, or playing tennis or team sports. · Get enough sleep. · Eat a healthy, balanced diet. It includes whole grains, dairy, fruits and vegetables, and protein. Eat a variety of foods from each of those groups.  This will get you all the nutrients you need. · Avoid alcohol and drugs. · Keep the numbers for these national suicide hotlines: 0-913-748-TALK (8-890.617.3945) and 1-170-HNBSDQM (9-533.963.8570). If you or someone you know talks about suicide or about feeling hopeless, get help right away. For the caregiver  If you are caring for someone with psychosis, it is important that you take care of yourself as well. · Learn about psychosis. Know the first signs that symptoms are getting worse. · Make a plan with all family members about how to take care of your loved one when his or her symptoms are bad. · Talk about your fears and concerns and those of other family members. · Seek counseling if you need to. · Know your legal rights and the legal rights of your family member or loved one. · Take care of yourself. Stay involved with your own interests, such as your career, hobbies, and friends. Try things like exercise, positive self-talk, relaxation, and deep breathing to help manage your stress. · Give yourself time to grieve. You may need to deal with emotions such as anger, fear, and frustration. After you work through your feelings, you will be better able to care for yourself and your family. When should you call for help? Call 911 anytime you think you may need emergency care. For example, call if:    · You feel you cannot stop from hurting yourself or someone else.     · Someone who has psychosis displays dangerous behavior, and you think the person might hurt himself or herself or someone else. Call your doctor now or seek immediate medical care if:    · A person with psychosis mentions suicide. If a suicide threat seems real, with a specific plan and a way to carry it out, you should stay with the person, or ask someone you trust to stay with the person, until you get help.     · A person who has psychosis:  ? Starts to give away his or her possessions. ? Uses illegal drugs or drinks alcohol heavily.   ? Talks or writes about death, including writing suicide notes and talking about guns, knives, or pills. ? Starts to spend a lot of time alone. ? Acts very aggressively or suddenly appears calm.     · You hear voices or think you see things that are not there.     · You have a sudden change in behavior.     · You have difficulty taking care of yourself or become confused doing simple chores or tasks. Watch closely for changes in your health, and be sure to contact your doctor if:    · Your symptoms repeatedly upset your daily activities.     · You have symptoms of psychosis that are new or different from those you had before. Where can you learn more? Go to http://www.gray.com/  Enter D547 in the search box to learn more about \"Psychosis: Care Instructions. \"  Current as of: September 23, 2020               Content Version: 12.8  © 3573-5320 Healthwise, Incorporated. Care instructions adapted under license by [employer] (which disclaims liability or warranty for this information). If you have questions about a medical condition or this instruction, always ask your healthcare professional. [address] any warranty or liability for your use of this information.

## 2021-07-29 RX ORDER — LOSARTAN POTASSIUM 100 MG/1
TABLET ORAL
Qty: 90 TABLET | Refills: 1 | Status: SHIPPED | OUTPATIENT
Start: 2021-07-29 | End: 2021-12-10

## 2021-07-29 RX ORDER — METFORMIN HYDROCHLORIDE 500 MG/1
TABLET ORAL
Qty: 180 TABLET | Refills: 1 | Status: SHIPPED | OUTPATIENT
Start: 2021-07-29 | End: 2021-08-21

## 2021-07-29 RX ORDER — METOPROLOL SUCCINATE 50 MG/1
TABLET, EXTENDED RELEASE ORAL
Qty: 90 TABLET | Refills: 1 | Status: SHIPPED | OUTPATIENT
Start: 2021-07-29 | End: 2021-08-21

## 2021-08-04 DIAGNOSIS — E78.5 HYPERLIPIDEMIA, UNSPECIFIED HYPERLIPIDEMIA TYPE: ICD-10-CM

## 2021-08-06 RX ORDER — OMEGA-3-ACID ETHYL ESTERS 1 G/1
CAPSULE, LIQUID FILLED ORAL
Qty: 360 CAPSULE | Refills: 1 | Status: SHIPPED | OUTPATIENT
Start: 2021-08-06 | End: 2021-11-14 | Stop reason: ALTCHOICE

## 2021-08-10 ENCOUNTER — OFFICE VISIT (OUTPATIENT)
Dept: BEHAVIORAL/MENTAL HEALTH CLINIC | Age: 71
End: 2021-08-10
Payer: MEDICARE

## 2021-08-10 VITALS — BODY MASS INDEX: 37.53 KG/M2 | WEIGHT: 205.2 LBS

## 2021-08-10 DIAGNOSIS — F29 PSYCHOSIS, UNSPECIFIED PSYCHOSIS TYPE (HCC): ICD-10-CM

## 2021-08-10 PROCEDURE — G8536 NO DOC ELDER MAL SCRN: HCPCS | Performed by: NURSE PRACTITIONER

## 2021-08-10 PROCEDURE — G8427 DOCREV CUR MEDS BY ELIG CLIN: HCPCS | Performed by: NURSE PRACTITIONER

## 2021-08-10 PROCEDURE — G8432 DEP SCR NOT DOC, RNG: HCPCS | Performed by: NURSE PRACTITIONER

## 2021-08-10 PROCEDURE — G8417 CALC BMI ABV UP PARAM F/U: HCPCS | Performed by: NURSE PRACTITIONER

## 2021-08-10 PROCEDURE — 90792 PSYCH DIAG EVAL W/MED SRVCS: CPT | Performed by: NURSE PRACTITIONER

## 2021-08-10 RX ORDER — RISPERIDONE 0.5 MG/1
0.5 TABLET, FILM COATED ORAL
Qty: 90 TABLET | Refills: 1 | Status: SHIPPED | OUTPATIENT
Start: 2021-08-10 | End: 2021-10-29 | Stop reason: SDUPTHER

## 2021-08-10 NOTE — PROGRESS NOTES
Dickson Hugo is a 66 y.o. female who presents today for the following:  Chief Complaint   Patient presents with    New Patient     \"I feel good today. I've been taking medications. \"       Allergies   Allergen Reactions    Egg Nausea and Vomiting       Current Outpatient Medications   Medication Sig    risperiDONE (RisperDAL) 0.5 mg tablet Take 1 Tablet by mouth nightly for 90 days.  omega-3 acid ethyl esters (LOVAZA) 1 gram capsule TAKE 2 CAPSULES TWICE DAILY    losartan (COZAAR) 100 mg tablet Take 1 tablet by mouth once daily  Indications: high blood pressure    metFORMIN (GLUCOPHAGE) 500 mg tablet TAKE 1 TABLET BY MOUTH TWICE DAILY WITH MEALS  Indications: type 2 diabetes mellitus    metoprolol succinate (TOPROL-XL) 50 mg XL tablet Take 1 tablet by mouth once daily    sertraline (ZOLOFT) 25 mg tablet TAKE 1 TABLET EVERY DAY    Cetirizine (ZyrTEC) 10 mg cap Take 10 mg by mouth daily.  rosuvastatin (CRESTOR) 10 mg tablet Take 1 tablet by mouth once daily    triamterene-hydroCHLOROthiazide (MAXZIDE) 37.5-25 mg per tablet Take 1 tablet by mouth once daily     No current facility-administered medications for this visit.        Past Medical History:   Diagnosis Date    Chronic osteoarthritis 7/17/2020    Diabetes ([address]     Elevated glucose 7/17/2020    Essential (primary) hypertension 7/17/2020    Menopause     Mixed hyperlipidemia 7/17/2020       Past Surgical History:   Procedure Laterality Date    HX BREAST BIOPSY Left 11/2019    HX GYN         Family History   Problem Relation Age of Onset    Heart Disease Mother     Heart Disease Brother        Social History     Socioeconomic History    Marital status: SINGLE     Spouse name: Not on file    Number of children: Not on file    Years of education: Not on file    Highest education level: Not on file   Occupational History    Not on file   Tobacco Use    Smoking status: Current Every Day Smoker     Packs/day: 0.25    Smokeless tobacco: Never Used    Tobacco comment: States she smokes very seldom. Patient smokes 3-4 Black and mild cigars a day. Vaping Use    Vaping Use: Never used   Substance and Sexual Activity    Alcohol use: Not Currently     Comment: Occasionally    Drug use: Not Currently    Sexual activity: Not on file   Other Topics Concern    Not on file   Social History Narrative    Not on file     Social Determinants of Health     Financial Resource Strain:     Difficulty of Paying Living Expenses:    Food Insecurity:     Worried About Running Out of Food in the Last Year:     [address] in the Last Year:    Transportation Needs:     Lack of Transportation (Medical):  Lack of Transportation (Non-Medical):    Physical Activity:     Days of Exercise per Week:     Minutes of Exercise per Session:    Stress:     Feeling of Stress :    Social Connections:     Frequency of Communication with Friends and Family:     Frequency of Social Gatherings with Friends and Family:     Attends [employer] Services:     Active Member of Clubs or Organizations:     Attends Club or Organization Meetings:     Marital Status:    Intimate Partner Violence:     Fear of Current or Ex-Partner:     Emotionally Abused:     Physically Abused:     Sexually Abused:          Ms. Wilfredo Del Toro is a 69-year-old -American  female who was referred to the clinic by Dr. Max Larsen for psychotropic medication management. Patient has history of psychotic episode several months ago and was started on risperidone 0.5 mg take 1 tablet twice daily and sertraline 25 mg 1 tab daily which she stopped on her own and was restarted on risperidone and sertraline by Dr. Max Larsen on last office visit June 24, 2021. Patient presents to the clinic accompanied by her son Gill Honeycutt. She is clean, fully alert and oriented. It is noted patient is hard of hearing. Gait is stable and slow.   She is able to answer questions appropriately while maintaining good eye contact. Patient mentions that she was hospitalized after a fall last year and was diagnosed with a urinary tract infection. According to patient's son, he believes her fall/psychotic episode was related to the stress of a new relationship that the patient was involved in online. Patient is no longer in communication with this man and reports feeling much better. She denies psychotic symptoms on direct questioning. However, her son reports that she ran out of risperidone a couple weeks ago and is requesting a new prescription today. Syl Osullivan reports that he feels that patient still has some mild anxiety and depression. No suicidal/homicidal thinking noted, risk is low based on history. Suicide protective factors-family and Orthodoxy. She denies issues with sleep and appetite. Energy-stable. Patient was reared by her parents. She has no siblings. She denies abuse/trauma history. 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 Patient is . She reports that she was  for 45 years and have 3 children ages 47, 46 and 44. She reports good relationship with her children. Patient has supportive family and lives in the home with her son Syl Osullivan. [employer].  No  or legal history noted. Patient smokes \"sometimes. \"  She denies alcohol and drug use. Patient denies any history of substance abuse. Review of Systems   HENT: Positive for hearing loss. All other systems reviewed and are negative. Visit Vitals  Wt 93.1 kg (205 lb 3.2 oz)   BMI 37.53 kg/m²     Physical Exam  Psychiatric:         Attention and Perception: Attention and perception normal.         Mood and Affect: Mood and affect normal.         Speech: Speech normal.         Behavior: Behavior normal. Behavior is cooperative. Thought Content:  Thought content normal.         Cognition and Memory: Cognition and memory normal.         Judgment: Judgment normal.        Plan:    Continue sertraline and risperidone as prescribed. Follow-up with medical provider as appropriate. For emergencies-call 911 or go to the emergency department. Advised patient to avoid smoking, alcohol and drug use.

## 2021-08-20 ENCOUNTER — OFFICE VISIT (OUTPATIENT)
Dept: PRIMARY CARE CLINIC | Age: 71
End: 2021-08-20
Payer: MEDICARE

## 2021-08-20 VITALS
DIASTOLIC BLOOD PRESSURE: 93 MMHG | RESPIRATION RATE: 18 BRPM | TEMPERATURE: 97.4 F | HEART RATE: 64 BPM | SYSTOLIC BLOOD PRESSURE: 145 MMHG | WEIGHT: 204.6 LBS | BODY MASS INDEX: 37.42 KG/M2 | OXYGEN SATURATION: 98 %

## 2021-08-20 DIAGNOSIS — I65.23 BILATERAL CAROTID ARTERY STENOSIS: ICD-10-CM

## 2021-08-20 DIAGNOSIS — H25.9 SENILE CATARACT, UNSPECIFIED AGE-RELATED CATARACT TYPE, UNSPECIFIED LATERALITY: ICD-10-CM

## 2021-08-20 DIAGNOSIS — M19.90 CHRONIC OSTEOARTHRITIS: ICD-10-CM

## 2021-08-20 DIAGNOSIS — I67.89 CEREBRAL MICROVASCULAR DISEASE: ICD-10-CM

## 2021-08-20 DIAGNOSIS — E11.51 TYPE 2 DIABETES MELLITUS WITH PERIPHERAL VASCULAR DISEASE (HCC): Primary | ICD-10-CM

## 2021-08-20 DIAGNOSIS — E78.5 HYPERLIPIDEMIA, UNSPECIFIED HYPERLIPIDEMIA TYPE: ICD-10-CM

## 2021-08-20 DIAGNOSIS — F29 PSYCHOSIS, UNSPECIFIED PSYCHOSIS TYPE (HCC): ICD-10-CM

## 2021-08-20 DIAGNOSIS — I10 ESSENTIAL (PRIMARY) HYPERTENSION: ICD-10-CM

## 2021-08-20 DIAGNOSIS — Z01.818 PREOP EXAMINATION: ICD-10-CM

## 2021-08-20 DIAGNOSIS — H61.22 IMPACTED CERUMEN OF LEFT EAR: ICD-10-CM

## 2021-08-20 LAB — HBA1C MFR BLD HPLC: 5.6 %

## 2021-08-20 PROCEDURE — G8400 PT W/DXA NO RESULTS DOC: HCPCS | Performed by: NURSE PRACTITIONER

## 2021-08-20 PROCEDURE — 3044F HG A1C LEVEL LT 7.0%: CPT | Performed by: NURSE PRACTITIONER

## 2021-08-20 PROCEDURE — G8427 DOCREV CUR MEDS BY ELIG CLIN: HCPCS | Performed by: NURSE PRACTITIONER

## 2021-08-20 PROCEDURE — 3017F COLORECTAL CA SCREEN DOC REV: CPT | Performed by: NURSE PRACTITIONER

## 2021-08-20 PROCEDURE — 2022F DILAT RTA XM EVC RTNOPTHY: CPT | Performed by: NURSE PRACTITIONER

## 2021-08-20 PROCEDURE — 83036 HEMOGLOBIN GLYCOSYLATED A1C: CPT | Performed by: NURSE PRACTITIONER

## 2021-08-20 PROCEDURE — G8753 SYS BP > OR = 140: HCPCS | Performed by: NURSE PRACTITIONER

## 2021-08-20 PROCEDURE — G8754 DIAS BP LESS 90: HCPCS | Performed by: NURSE PRACTITIONER

## 2021-08-20 PROCEDURE — 1090F PRES/ABSN URINE INCON ASSESS: CPT | Performed by: NURSE PRACTITIONER

## 2021-08-20 PROCEDURE — 99214 OFFICE O/P EST MOD 30 MIN: CPT | Performed by: NURSE PRACTITIONER

## 2021-08-20 PROCEDURE — G8417 CALC BMI ABV UP PARAM F/U: HCPCS | Performed by: NURSE PRACTITIONER

## 2021-08-20 PROCEDURE — G8536 NO DOC ELDER MAL SCRN: HCPCS | Performed by: NURSE PRACTITIONER

## 2021-08-20 PROCEDURE — G8432 DEP SCR NOT DOC, RNG: HCPCS | Performed by: NURSE PRACTITIONER

## 2021-08-20 PROCEDURE — 1101F PT FALLS ASSESS-DOCD LE1/YR: CPT | Performed by: NURSE PRACTITIONER

## 2021-08-20 PROCEDURE — G9899 SCRN MAM PERF RSLTS DOC: HCPCS | Performed by: NURSE PRACTITIONER

## 2021-08-20 RX ORDER — OFLOXACIN 3 MG/ML
SOLUTION/ DROPS OPHTHALMIC
COMMUNITY
Start: 2021-07-26

## 2021-08-20 RX ORDER — GUAIFENESIN 100 MG/5ML
81 LIQUID (ML) ORAL DAILY
COMMUNITY

## 2021-08-20 RX ORDER — PREDNISOLONE ACETATE 10 MG/ML
SUSPENSION/ DROPS OPHTHALMIC
COMMUNITY
Start: 2021-07-26

## 2021-08-20 RX ORDER — KETOROLAC TROMETHAMINE 5 MG/ML
SOLUTION OPHTHALMIC
COMMUNITY
Start: 2021-07-26

## 2021-08-20 NOTE — PROGRESS NOTES
Kehinde Rodriguez is a 66 y.o. female who presents to the office today for the following:    Chief Complaint   Patient presents with    Pre-op Exam    Hypertension    Diabetes       Past Medical History:   Diagnosis Date    Chronic osteoarthritis 7/17/2020    Diabetes ([address]     Elevated glucose 7/17/2020    Essential (primary) hypertension 7/17/2020    Menopause     Mixed hyperlipidemia 7/17/2020       Past Surgical History:   Procedure Laterality Date    HX BREAST BIOPSY Left 11/2019    HX GYN          Family History   Problem Relation Age of Onset    Heart Disease Mother     Heart Disease Brother         Social History     Tobacco Use    Smoking status: Current Every Day Smoker     Packs/day: 0.25    Smokeless tobacco: Never Used    Tobacco comment: States she smokes very seldom. Patient smokes 3-4 Black and mild cigars a day. Vaping Use    Vaping Use: Never used   Substance Use Topics    Alcohol use: Not Currently     Comment: Occasionally    Drug use: Not Currently        HPI  Patient here today for pre-op clearance for left eye cataract with PMH of carotid stenosis, hypertension, hyperlipidemia, type 2 diabetes, obesity, psychosis, cataracts and cerebral microvascular disease. She is normally followed in primary care by Dr. Jose M Lopez. Family is with her today. She reports taking medications as directed. Feels well with no specific concerns.      Current Outpatient Medications on File Prior to Visit   Medication Sig    prednisoLONE acetate (PRED FORTE) 1 % ophthalmic suspension INSTILL 1 DROP 4 TIMES DAILY ONCE DAILY INTO EYE AFTER SURGERY FOR 1 WEEK THEN TAPER AS DIRECTED    ofloxacin (FLOXIN) 0.3 % ophthalmic solution INSTILL 1 DROP FOUR TIMES DAILY INTO EYE HAVING SURGERY START 2 DAYS PRIOR TO SURGERY    ketorolac (ACULAR) 0.5 % ophthalmic solution INSTILL 1 DROP FOUR TIMES DAILY INTO EYE HAVING SURGERY START 2 DAYS PRIOR TO SURGERY    aspirin 81 mg chewable tablet Take 81 mg by mouth daily.    losartan (COZAAR) 100 mg tablet Take 1 tablet by mouth once daily  Indications: high blood pressure    metFORMIN (GLUCOPHAGE) 500 mg tablet TAKE 1 TABLET BY MOUTH TWICE DAILY WITH MEALS  Indications: type 2 diabetes mellitus    metoprolol succinate (TOPROL-XL) 50 mg XL tablet Take 1 tablet by mouth once daily    Cetirizine (ZyrTEC) 10 mg cap Take 10 mg by mouth daily.  rosuvastatin (CRESTOR) 10 mg tablet Take 1 tablet by mouth once daily    triamterene-hydroCHLOROthiazide (MAXZIDE) 37.5-25 mg per tablet Take 1 tablet by mouth once daily    risperiDONE (RisperDAL) 0.5 mg tablet Take 1 Tablet by mouth nightly for 90 days. (Patient not taking: Reported on 8/20/2021)    omega-3 acid ethyl esters (LOVAZA) 1 gram capsule TAKE 2 CAPSULES TWICE DAILY (Patient not taking: Reported on 8/20/2021)    sertraline (ZOLOFT) 25 mg tablet TAKE 1 TABLET EVERY DAY (Patient not taking: Reported on 8/20/2021)     No current facility-administered medications on file prior to visit. No orders of the defined types were placed in this encounter. Review of Systems   Constitutional: Negative. HENT: Positive for hearing loss. Negative for congestion, ear discharge, ear pain, nosebleeds, sinus pain, sore throat and tinnitus. Eyes: Positive for blurred vision. Negative for pain, discharge and redness. Respiratory: Negative. Negative for stridor. Cardiovascular: Negative. Gastrointestinal: Negative. Genitourinary: Negative. Musculoskeletal: Positive for joint pain and myalgias. Negative for back pain, falls and neck pain. Skin: Negative. Neurological: Negative. Psychiatric/Behavioral: Negative for hallucinations, substance abuse and suicidal ideas. The patient is nervous/anxious. Visit Vitals  BP (!) 145/93   Pulse 64   Temp 97.4 °F (36.3 °C) (Temporal)   Resp 18   Wt 204 lb 9.6 oz (92.8 kg)   SpO2 98%   BMI 37.42 kg/m²       Physical Exam  Vitals and nursing note reviewed. Constitutional:       Appearance: Normal appearance. She is obese. HENT:      Head: Normocephalic and atraumatic. Right Ear: There is no impacted cerumen. Left Ear: There is impacted cerumen. Eyes:      Pupils: Pupils are equal, round, and reactive to light. Neck:      Vascular: No carotid bruit. Cardiovascular:      Rate and Rhythm: Normal rate and regular rhythm. Pulses: Normal pulses. Heart sounds: Normal heart sounds. Pulmonary:      Effort: Pulmonary effort is normal.      Breath sounds: Normal breath sounds. Abdominal:      General: Bowel sounds are normal.      Palpations: Abdomen is soft. Tenderness: There is no abdominal tenderness. There is no guarding or rebound. Hernia: No hernia is present. Musculoskeletal:         General: Normal range of motion. Right lower leg: No edema. Left lower leg: No edema. Lymphadenopathy:      Cervical: No cervical adenopathy. Skin:     General: Skin is warm and dry. Neurological:      Mental Status: She is alert and oriented to person, place, and time. Mental status is at baseline. 1. Type 2 diabetes mellitus with peripheral vascular disease ([facility] [address]  Lab Results   Component Value Date/Time    Hemoglobin A1c 5.7 (H) 05/27/2021 09:34 AM    Hemoglobin A1c (POC) 5.6 08/20/2021 08:32 AM    Hemoglobin A1c, External 7.4 02/26/2019 12:00 AM   Stable  On metformin 500mg twice daily   Checks sugars and usually under 150  Continue to encourage following diabetic diet and getting regular exercise 3-5 times weekly  She is up to date on diabetic eye exam  Checking fasting labs today  - AMB POC HEMOGLOBIN A1C    2. Hyperlipidemia, unspecified hyperlipidemia type  Lab Results   Component Value Date/Time    LDL, calculated 112 (H) 05/27/2021 09:34 AM    LDL, calculated 99 07/30/2020 03:48 PM   Stable and continues on rosuvastatin as directed    3.  Essential (primary) hypertension  Blood pressure is above goal today but just took medication prior to appointment  Will have her monitor at home and notify provider if staying above 140/90  Continue medications as directed    4. Cerebral microvascular disease  Stable    5. Chronic osteoarthritis  Stable and uses tylenol prn     6. Bilateral carotid artery stenosis  <50% stenosis bilateral  On ASA daily as directed    7. Senile cataract, unspecified age-related cataract type, unspecified laterality  She is having left cataract surgery on 8/31/21 and future date for right eye  Continue care with ophthalmology    8. Psychosis, unspecified psychosis type ([facility] [address]  Stable on Risperdal and will continue care with psychiatry    9. Left cerumen impaction  Recommend OTC debrox ear wax remover for 2-3 days and RTO for ear to be flushed    10. Preop examination  She is ok to proceed with cataract surgery under local anesthesia    Patient verbalizes understanding of plan of care as discussed above    Follow-up and Dispositions    · Return in about 3 months (around 11/20/2021) for or sooner for worsening symptoms.

## 2021-08-21 LAB
ALBUMIN SERPL-MCNC: 4.4 G/DL (ref 3.7–4.7)
ALBUMIN/CREAT UR: 69 MG/G CREAT (ref 0–29)
ALBUMIN/GLOB SERPL: 1.5 {RATIO} (ref 1.2–2.2)
ALP SERPL-CCNC: 124 IU/L (ref 48–121)
ALT SERPL-CCNC: 8 IU/L (ref 0–32)
APPEARANCE UR: CLEAR
AST SERPL-CCNC: 11 IU/L (ref 0–40)
BACTERIA #/AREA URNS HPF: NORMAL /[HPF]
BASOPHILS # BLD AUTO: 0 X10E3/UL (ref 0–0.2)
BASOPHILS NFR BLD AUTO: 1 %
BILIRUB SERPL-MCNC: 0.3 MG/DL (ref 0–1.2)
BILIRUB UR QL STRIP: NEGATIVE
BUN SERPL-MCNC: 16 MG/DL (ref 8–27)
BUN/CREAT SERPL: 15 (ref 12–28)
CALCIUM SERPL-MCNC: 9.9 MG/DL (ref 8.7–10.3)
CASTS URNS QL MICRO: NORMAL /LPF
CHLORIDE SERPL-SCNC: 103 MMOL/L (ref 96–106)
CHOLEST SERPL-MCNC: 189 MG/DL (ref 100–199)
CO2 SERPL-SCNC: 26 MMOL/L (ref 20–29)
COLOR UR: YELLOW
CREAT SERPL-MCNC: 1.06 MG/DL (ref 0.57–1)
CREAT UR-MCNC: 33.4 MG/DL
EOSINOPHIL # BLD AUTO: 0.2 X10E3/UL (ref 0–0.4)
EOSINOPHIL NFR BLD AUTO: 3 %
EPI CELLS #/AREA URNS HPF: NORMAL /HPF (ref 0–10)
ERYTHROCYTE [DISTWIDTH] IN BLOOD BY AUTOMATED COUNT: 12.6 % (ref 11.7–15.4)
GLOBULIN SER CALC-MCNC: 3 G/DL (ref 1.5–4.5)
GLUCOSE SERPL-MCNC: 101 MG/DL (ref 65–99)
GLUCOSE UR QL: NEGATIVE
HCT VFR BLD AUTO: 40 % (ref 34–46.6)
HDLC SERPL-MCNC: 72 MG/DL
HGB BLD-MCNC: 13 G/DL (ref 11.1–15.9)
HGB UR QL STRIP: NEGATIVE
IMM GRANULOCYTES # BLD AUTO: 0 X10E3/UL (ref 0–0.1)
IMM GRANULOCYTES NFR BLD AUTO: 0 %
KETONES UR QL STRIP: NEGATIVE
LDLC SERPL CALC-MCNC: 106 MG/DL (ref 0–99)
LEUKOCYTE ESTERASE UR QL STRIP: ABNORMAL
LYMPHOCYTES # BLD AUTO: 2.2 X10E3/UL (ref 0.7–3.1)
LYMPHOCYTES NFR BLD AUTO: 32 %
MCH RBC QN AUTO: 32.3 PG (ref 26.6–33)
MCHC RBC AUTO-ENTMCNC: 32.5 G/DL (ref 31.5–35.7)
MCV RBC AUTO: 99 FL (ref 79–97)
MICRO URNS: ABNORMAL
MICROALBUMIN UR-MCNC: 23.2 UG/ML
MONOCYTES # BLD AUTO: 0.7 X10E3/UL (ref 0.1–0.9)
MONOCYTES NFR BLD AUTO: 9 %
NEUTROPHILS # BLD AUTO: 3.8 X10E3/UL (ref 1.4–7)
NEUTROPHILS NFR BLD AUTO: 55 %
NITRITE UR QL STRIP: NEGATIVE
PH UR STRIP: 8 [PH] (ref 5–7.5)
PLATELET # BLD AUTO: 174 X10E3/UL (ref 150–450)
POTASSIUM SERPL-SCNC: 4.4 MMOL/L (ref 3.5–5.2)
PROT SERPL-MCNC: 7.4 G/DL (ref 6–8.5)
PROT UR QL STRIP: NEGATIVE
RBC # BLD AUTO: 4.03 X10E6/UL (ref 3.77–5.28)
RBC #/AREA URNS HPF: NORMAL /HPF (ref 0–2)
SODIUM SERPL-SCNC: 141 MMOL/L (ref 134–144)
SP GR UR: 1.01 (ref 1–1.03)
TRIGL SERPL-MCNC: 57 MG/DL (ref 0–149)
UROBILINOGEN UR STRIP-MCNC: 0.2 MG/DL (ref 0.2–1)
VLDLC SERPL CALC-MCNC: 11 MG/DL (ref 5–40)
WBC # BLD AUTO: 7 X10E3/UL (ref 3.4–10.8)
WBC #/AREA URNS HPF: NORMAL /HPF (ref 0–5)

## 2021-08-21 RX ORDER — METOPROLOL SUCCINATE 50 MG/1
TABLET, EXTENDED RELEASE ORAL
Qty: 90 TABLET | Refills: 1 | Status: SHIPPED | OUTPATIENT
Start: 2021-08-21

## 2021-08-21 RX ORDER — TRIAMTERENE/HYDROCHLOROTHIAZID 37.5-25 MG
TABLET ORAL
Qty: 90 TABLET | Refills: 1 | Status: SHIPPED | OUTPATIENT
Start: 2021-08-21

## 2021-08-21 RX ORDER — METFORMIN HYDROCHLORIDE 500 MG/1
TABLET ORAL
Qty: 180 TABLET | Refills: 1 | Status: SHIPPED | OUTPATIENT
Start: 2021-08-21

## 2021-09-01 NOTE — PROGRESS NOTES
Please let patient know that kidney functions are stable compared to baseline. Bad cholesterol is just a little elevated at 106. Continue statin medication and encourage low cholesterol diet along with getting regular exercise to control. Otherwise labs are essentially stable. If any questions let me know. Repeat in 3 months.

## 2021-09-07 ENCOUNTER — TELEPHONE (OUTPATIENT)
Dept: PRIMARY CARE CLINIC | Age: 71
End: 2021-09-07

## 2021-09-24 ENCOUNTER — TELEPHONE (OUTPATIENT)
Dept: PRIMARY CARE CLINIC | Age: 71
End: 2021-09-24

## 2021-09-24 NOTE — TELEPHONE ENCOUNTER
Patient wanted to make sure you sent her paperwork for her surgery. She has an appointment with Geoffrey Burrell this Monday, 09/27/2021. Patient would like a call back.

## 2021-10-03 RX ORDER — ROSUVASTATIN CALCIUM 10 MG/1
TABLET, COATED ORAL
Qty: 90 TABLET | Refills: 1 | Status: SHIPPED | OUTPATIENT
Start: 2021-10-03 | End: 2021-10-05 | Stop reason: SDUPTHER

## 2021-10-05 NOTE — TELEPHONE ENCOUNTER
Requested Prescriptions     Pending Prescriptions Disp Refills    rosuvastatin (CRESTOR) 10 mg tablet 90 Tablet 1     Sig: TAKE 1 TABLET EVERY DAY     Patient is requesting a refill.

## 2021-10-06 RX ORDER — ROSUVASTATIN CALCIUM 10 MG/1
TABLET, COATED ORAL
Qty: 90 TABLET | Refills: 1 | Status: SHIPPED | OUTPATIENT
Start: 2021-10-06

## 2021-10-13 ENCOUNTER — TRANSCRIBE ORDER (OUTPATIENT)
Dept: SCHEDULING | Age: 71
End: 2021-10-13

## 2021-10-13 DIAGNOSIS — Z12.31 ENCOUNTER FOR MAMMOGRAM TO ESTABLISH BASELINE MAMMOGRAM: Primary | ICD-10-CM

## 2021-10-27 DIAGNOSIS — T78.40XD ALLERGIC DISORDER, SUBSEQUENT ENCOUNTER: Primary | ICD-10-CM

## 2021-10-29 DIAGNOSIS — F29 PSYCHOSIS, UNSPECIFIED PSYCHOSIS TYPE (HCC): ICD-10-CM

## 2021-10-29 RX ORDER — RISPERIDONE 0.5 MG/1
0.5 TABLET, FILM COATED ORAL
Qty: 90 TABLET | Refills: 1 | Status: SHIPPED | OUTPATIENT
Start: 2021-10-29 | End: 2022-01-27

## 2021-10-29 NOTE — TELEPHONE ENCOUNTER
Requested Prescriptions     Pending Prescriptions Disp Refills    risperiDONE (RisperDAL) 0.5 mg tablet 90 Tablet 1     Sig: Take 1 Tablet by mouth nightly for 90 days. Patient is requesting a refill.

## 2021-11-01 RX ORDER — CETIRIZINE HCL 10 MG
TABLET ORAL
Qty: 90 TABLET | Refills: 1 | Status: SHIPPED | OUTPATIENT
Start: 2021-11-01

## 2021-11-02 ENCOUNTER — HOSPITAL ENCOUNTER (OUTPATIENT)
Dept: MAMMOGRAPHY | Age: 71
Discharge: HOME | End: 2021-11-02
Attending: FAMILY MEDICINE
Payer: MEDICARE

## 2021-11-02 DIAGNOSIS — Z12.31 ENCOUNTER FOR MAMMOGRAM TO ESTABLISH BASELINE MAMMOGRAM: ICD-10-CM

## 2021-11-02 PROCEDURE — 77063 BREAST TOMOSYNTHESIS BI: CPT

## 2021-11-11 ENCOUNTER — OFFICE VISIT (OUTPATIENT)
Dept: PRIMARY CARE CLINIC | Age: 71
End: 2021-11-11
Payer: MEDICARE

## 2021-11-11 VITALS
DIASTOLIC BLOOD PRESSURE: 87 MMHG | WEIGHT: 209.2 LBS | HEART RATE: 74 BPM | HEIGHT: 62 IN | RESPIRATION RATE: 18 BRPM | OXYGEN SATURATION: 96 % | TEMPERATURE: 98.9 F | SYSTOLIC BLOOD PRESSURE: 165 MMHG | BODY MASS INDEX: 38.5 KG/M2

## 2021-11-11 DIAGNOSIS — I65.23 BILATERAL CAROTID ARTERY STENOSIS: ICD-10-CM

## 2021-11-11 DIAGNOSIS — Z86.59 HISTORY OF PSYCHOSIS: ICD-10-CM

## 2021-11-11 DIAGNOSIS — E78.2 MIXED HYPERLIPIDEMIA: ICD-10-CM

## 2021-11-11 DIAGNOSIS — E11.51 TYPE 2 DIABETES MELLITUS WITH PERIPHERAL VASCULAR DISEASE (HCC): ICD-10-CM

## 2021-11-11 DIAGNOSIS — I10 ESSENTIAL (PRIMARY) HYPERTENSION: Primary | ICD-10-CM

## 2021-11-11 DIAGNOSIS — E11.69 TYPE 2 DIABETES MELLITUS WITH OTHER SPECIFIED COMPLICATION, WITHOUT LONG-TERM CURRENT USE OF INSULIN (HCC): ICD-10-CM

## 2021-11-11 PROCEDURE — 1090F PRES/ABSN URINE INCON ASSESS: CPT | Performed by: FAMILY MEDICINE

## 2021-11-11 PROCEDURE — G8427 DOCREV CUR MEDS BY ELIG CLIN: HCPCS | Performed by: FAMILY MEDICINE

## 2021-11-11 PROCEDURE — G8536 NO DOC ELDER MAL SCRN: HCPCS | Performed by: FAMILY MEDICINE

## 2021-11-11 PROCEDURE — G8753 SYS BP > OR = 140: HCPCS | Performed by: FAMILY MEDICINE

## 2021-11-11 PROCEDURE — G8417 CALC BMI ABV UP PARAM F/U: HCPCS | Performed by: FAMILY MEDICINE

## 2021-11-11 PROCEDURE — 2022F DILAT RTA XM EVC RTNOPTHY: CPT | Performed by: FAMILY MEDICINE

## 2021-11-11 PROCEDURE — 99214 OFFICE O/P EST MOD 30 MIN: CPT | Performed by: FAMILY MEDICINE

## 2021-11-11 PROCEDURE — G8400 PT W/DXA NO RESULTS DOC: HCPCS | Performed by: FAMILY MEDICINE

## 2021-11-11 PROCEDURE — G8754 DIAS BP LESS 90: HCPCS | Performed by: FAMILY MEDICINE

## 2021-11-11 PROCEDURE — 3017F COLORECTAL CA SCREEN DOC REV: CPT | Performed by: FAMILY MEDICINE

## 2021-11-11 PROCEDURE — 3044F HG A1C LEVEL LT 7.0%: CPT | Performed by: FAMILY MEDICINE

## 2021-11-11 PROCEDURE — G8510 SCR DEP NEG, NO PLAN REQD: HCPCS | Performed by: FAMILY MEDICINE

## 2021-11-11 PROCEDURE — G9899 SCRN MAM PERF RSLTS DOC: HCPCS | Performed by: FAMILY MEDICINE

## 2021-11-11 PROCEDURE — 1101F PT FALLS ASSESS-DOCD LE1/YR: CPT | Performed by: FAMILY MEDICINE

## 2021-11-11 NOTE — PROGRESS NOTES
Informed patient of mammogram results negative and need for repeat in 1 year. Patient stated understanding.

## 2021-11-11 NOTE — PROGRESS NOTES
Chief Complaint   Patient presents with    Diabetes     A1C done 2021-5. 6.  Hypertension    Cholesterol Problem    Osteoarthritis     1. Have you been to the ER, urgent care clinic since your last visit? Hospitalized since your last visit? No    2. Have you seen or consulted any other health care providers outside of the [address] [place] [address] since your last visit? Include any pap smears or colon screening. No   Patient states eye Dr. Efrain Cannon her to talk to PCP regarding medical marijuana. Zulay Hanson (: 1950) is a 66 y.o. female, established patient, here for evaluation of the following chief complaint(s):  Diabetes (A1C done 2021-5.6.), Hypertension, Cholesterol Problem, and Osteoarthritis       ASSESSMENT/PLAN:  Below is the assessment and plan developed based on review of pertinent history, physical exam, labs, studies, and medications. 1. Essential (primary) hypertension  -     CBC WITH AUTOMATED DIFF  -     METABOLIC PANEL, COMPREHENSIVE  -     URINALYSIS W/ RFLX MICROSCOPIC  2. Bilateral carotid artery stenosis  3. Type 2 diabetes mellitus with peripheral vascular disease (HCC)  -     MICROALBUMIN, UR, RAND W/ MICROALB/CREAT RATIO  -     HEMOGLOBIN A1C WITH EAG  4. Type 2 diabetes mellitus with other specified complication, without long-term current use of insulin ([facility] [address]  5. Mixed hyperlipidemia  -     LIPID PANEL  6. History of psychosis      Return in about 3 months (around 2022) for Follow up of chronic medical conditions, Fasting Lab Appointment. SUBJECTIVE/OBJECTIVE:  This patient comes in for follow-up of her hypertension, hyperlipidemia, history of cerebral microvascular disease, chronic osteoarthritis and diabetes mellitus. She had a hospitalization for psychosis a while back and was put on risperidone and says she still takes this once in a while to calm herself down but says there have been no other problems.   She is not here with a family member to confirm this. She appears okay without any significant problems. Review of Systems   Constitutional: Positive for unexpected weight change (Recent weight gain). Respiratory: Negative. Cardiovascular: Negative. Gastrointestinal: Negative. Endocrine: Negative. Genitourinary: Negative. Musculoskeletal: Negative. Allergic/Immunologic: Negative. Neurological: Negative. Hematological: Negative. Psychiatric/Behavioral: Negative. History of Psychosis. Apparently resolved and takes antipsychotic prn to rest       Physical Exam  Vitals and nursing note reviewed. Constitutional:       Appearance: Normal appearance. She is well-developed. She is morbidly obese. HENT:      Head: Normocephalic and atraumatic. Cardiovascular:      Rate and Rhythm: Normal rate and regular rhythm. Heart sounds: Normal heart sounds. Pulmonary:      Effort: Pulmonary effort is normal.      Breath sounds: Normal breath sounds. Abdominal:      General: Abdomen is flat. Bowel sounds are normal.      Palpations: Abdomen is soft. Musculoskeletal:         General: Normal range of motion. Neurological:      General: No focal deficit present. Mental Status: She is alert. Psychiatric:         Attention and Perception: Attention normal.         Mood and Affect: Mood and affect normal.         Speech: Speech normal.         Behavior: Behavior normal.         Thought Content: Thought content normal.         Judgment: Judgment normal.       Overall the patient's medical problems appear to be in good control. She is due for lab work and I will set up some fasting lab work and she can return for this. Apparently she is doing well with just as needed use of risperidone and we do not have any information from her family suggesting that her psychosis is persistent. She will work on weight loss and try to follow a healthy diet. An electronic signature was used to authenticate this note.   -- Pepe Varela MD

## 2021-11-14 PROBLEM — Z86.59 HISTORY OF PSYCHOSIS: Status: ACTIVE | Noted: 2021-11-14

## 2021-12-09 ENCOUNTER — TELEPHONE (OUTPATIENT)
Dept: PRIMARY CARE CLINIC | Age: 71
End: 2021-12-09

## 2021-12-10 RX ORDER — LOSARTAN POTASSIUM 100 MG/1
TABLET ORAL
Qty: 90 TABLET | Refills: 1 | Status: SHIPPED | OUTPATIENT
Start: 2021-12-10

## 2022-03-07 DIAGNOSIS — F29 PSYCHOSIS, UNSPECIFIED PSYCHOSIS TYPE (HCC): ICD-10-CM

## 2022-03-07 RX ORDER — SERTRALINE HYDROCHLORIDE 25 MG/1
TABLET, FILM COATED ORAL
Qty: 90 TABLET | Refills: 0 | Status: SHIPPED | OUTPATIENT
Start: 2022-03-07 | End: 2022-05-09

## 2022-03-18 PROBLEM — E78.2 MIXED HYPERLIPIDEMIA: Status: ACTIVE | Noted: 2020-07-17

## 2022-03-18 PROBLEM — E11.51 TYPE 2 DIABETES MELLITUS WITH PERIPHERAL VASCULAR DISEASE (HCC): Status: ACTIVE | Noted: 2021-05-18

## 2022-03-18 PROBLEM — E11.9 TYPE 2 DIABETES MELLITUS, WITHOUT LONG-TERM CURRENT USE OF INSULIN (HCC): Status: ACTIVE | Noted: 2020-11-13

## 2022-03-19 PROBLEM — I65.23 BILATERAL CAROTID ARTERY STENOSIS: Status: ACTIVE | Noted: 2020-11-10

## 2022-03-19 PROBLEM — Z86.59 HISTORY OF PSYCHOSIS: Status: ACTIVE | Noted: 2021-11-14

## 2022-03-19 PROBLEM — R73.09 ELEVATED GLUCOSE: Status: ACTIVE | Noted: 2020-07-17

## 2022-03-19 PROBLEM — I10 ESSENTIAL (PRIMARY) HYPERTENSION: Status: ACTIVE | Noted: 2020-07-17

## 2022-03-19 PROBLEM — M19.90 CHRONIC OSTEOARTHRITIS: Status: ACTIVE | Noted: 2020-07-17

## 2022-03-19 PROBLEM — R41.82 ACUTE ALTERATION IN MENTAL STATUS: Status: ACTIVE | Noted: 2020-11-10

## 2022-03-19 PROBLEM — I67.89 CEREBRAL MICROVASCULAR DISEASE: Status: ACTIVE | Noted: 2020-11-10

## 2022-03-20 PROBLEM — R41.0 ACUTE CONFUSION: Status: ACTIVE | Noted: 2020-11-10

## 2022-03-20 PROBLEM — R44.3 HALLUCINATIONS: Status: ACTIVE | Noted: 2020-11-10

## 2022-03-20 PROBLEM — F29 PSYCHOSIS (HCC): Status: ACTIVE | Noted: 2020-11-10

## 2022-03-25 ENCOUNTER — TELEPHONE (OUTPATIENT)
Dept: PRIMARY CARE CLINIC | Age: 72
End: 2022-03-25

## 2022-03-25 NOTE — TELEPHONE ENCOUNTER
----- Message from Jodie Mo sent at 3/25/2022  3:00 PM EDT -----  Subject: Message to Provider    QUESTIONS  Information for Provider? pt calling as she needs to have her medical   records transferred to an in network PCP Please call with information on   how this can be done for the pt.  ---------------------------------------------------------------------------  --------------  CALL BACK INFO  What is the best way for the office to contact you? OK to leave message on   voicemail  Preferred Call Back Phone Number?  [phone]  ---------------------------------------------------------------------------  --------------  SCRIPT ANSWERS  undefined

## 2022-05-09 DIAGNOSIS — F29 PSYCHOSIS, UNSPECIFIED PSYCHOSIS TYPE (HCC): ICD-10-CM

## 2022-05-09 RX ORDER — SERTRALINE HYDROCHLORIDE 25 MG/1
TABLET, FILM COATED ORAL
Qty: 90 TABLET | Refills: 0 | Status: SHIPPED | OUTPATIENT
Start: 2022-05-09 | End: 2022-10-06

## 2022-08-25 ENCOUNTER — HOSPITAL ENCOUNTER (EMERGENCY)
Age: 72
Discharge: HOME | End: 2022-08-25
Attending: EMERGENCY MEDICINE
Payer: MEDICARE

## 2022-08-25 VITALS
WEIGHT: 186 LBS | OXYGEN SATURATION: 99 % | RESPIRATION RATE: 16 BRPM | SYSTOLIC BLOOD PRESSURE: 170 MMHG | HEIGHT: 62 IN | TEMPERATURE: 97.9 F | BODY MASS INDEX: 34.23 KG/M2 | DIASTOLIC BLOOD PRESSURE: 82 MMHG | HEART RATE: 64 BPM

## 2022-08-25 DIAGNOSIS — J01.10 ACUTE NON-RECURRENT FRONTAL SINUSITIS: ICD-10-CM

## 2022-08-25 DIAGNOSIS — Z20.822 CLOSE EXPOSURE TO COVID-19 VIRUS: Primary | ICD-10-CM

## 2022-08-25 LAB
COVID-19 RAPID TEST, COVR: NOT DETECTED
SARS-COV-2, COV2: NORMAL

## 2022-08-25 PROCEDURE — 87635 SARS-COV-2 COVID-19 AMP PRB: CPT

## 2022-08-25 PROCEDURE — U0005 INFEC AGEN DETEC AMPLI PROBE: HCPCS

## 2022-08-25 PROCEDURE — 99283 EMERGENCY DEPT VISIT LOW MDM: CPT

## 2022-08-25 PROCEDURE — 74011250637 HC RX REV CODE- 250/637: Performed by: EMERGENCY MEDICINE

## 2022-08-25 RX ORDER — BUTALBITAL, ACETAMINOPHEN AND CAFFEINE 50; 325; 40 MG/1; MG/1; MG/1
2 TABLET ORAL
Status: COMPLETED | OUTPATIENT
Start: 2022-08-25 | End: 2022-08-25

## 2022-08-25 RX ADMIN — BUTALBITAL, ACETAMINOPHEN, AND CAFFEINE 2 TABLET: 50; 325; 40 TABLET ORAL at 09:57

## 2022-08-25 NOTE — ED PROVIDER NOTES
EMERGENCY DEPARTMENT HISTORY AND PHYSICAL EXAM      Date: 8/25/2022  Patient Name: Timothy Lee    History of Presenting Illness     Chief Complaint   Patient presents with    Covid Testing       History Provided By: Patient and Patient's Son    HPI: Timothy Lee, 67 y.o. female with a past medical history significant hypertension presents to the ED with chief complaint of Covid Testing  . 59-year-old female with a history of hypertension compliant on her medication. Patient had her niece visit her on Sunday and they found out she was COVID-positive at that time. She has been dealing with some sinus drainage nasal congestion and a frontal pressure-like headache for the last few days since the exposure. Generalized body aches. No coughing. No shortness of breath. She is not vaccinated against COVID-19. Otherwise no dysuria. No back pain. No abdominal pain. No fevers that she knows of. No pain medicine prior to arrival.              There are no other complaints, changes, or physical findings at this time. PCP: No primary care provider on file.     Current Facility-Administered Medications   Medication Dose Route Frequency Provider Last Rate Last Admin    butalbital-acetaminophen-caffeine (FIORICET, ESGIC) -40 mg per tablet 2 Tablet  2 Tablet Oral NOW Ron Vieira MD         Current Outpatient Medications   Medication Sig Dispense Refill    sertraline (ZOLOFT) 25 mg tablet TAKE 1 TABLET EVERY DAY 90 Tablet 0    losartan (COZAAR) 100 mg tablet TAKE 1 TABLET BY MOUTH ONCE DAILY  INDICATIONS: HIGH BLOOD PRESSURE 90 Tablet 1    cetirizine (ZYRTEC) 10 mg tablet TAKE 1 TABLET EVERY DAY 90 Tablet 1    rosuvastatin (CRESTOR) 10 mg tablet TAKE 1 TABLET EVERY DAY 90 Tablet 1    triamterene-hydroCHLOROthiazide (MAXZIDE) 37.5-25 mg per tablet TAKE 1 TABLET EVERY DAY 90 Tablet 1    metoprolol succinate (TOPROL-XL) 50 mg XL tablet TAKE 1 TABLET EVERY DAY 90 Tablet 1    metFORMIN (GLUCOPHAGE) 500 mg tablet TAKE 1 TABLET BY MOUTH TWICE DAILY WITH MEALS  INDICATIONS: TYPE 2 DIABETES MELLITUS 180 Tablet 1    prednisoLONE acetate (PRED FORTE) 1 % ophthalmic suspension INSTILL 1 DROP 4 TIMES DAILY ONCE DAILY INTO EYE AFTER SURGERY FOR 1 WEEK THEN TAPER AS DIRECTED      ofloxacin (FLOXIN) 0.3 % ophthalmic solution INSTILL 1 DROP FOUR TIMES DAILY INTO EYE HAVING SURGERY START 2 DAYS PRIOR TO SURGERY      ketorolac (ACULAR) 0.5 % ophthalmic solution INSTILL 1 DROP FOUR TIMES DAILY INTO EYE HAVING SURGERY START 2 DAYS PRIOR TO SURGERY      aspirin 81 mg chewable tablet Take 81 mg by mouth daily. Past History     Past Medical History:  Past Medical History:   Diagnosis Date    Chronic osteoarthritis 7/17/2020    Diabetes ([address]     Elevated glucose 7/17/2020    Essential (primary) hypertension 7/17/2020    Menopause     Mixed hyperlipidemia 7/17/2020       Past Surgical History:  Past Surgical History:   Procedure Laterality Date    HX BREAST BIOPSY Left 11/2019    HX GYN         Family History:  Family History   Problem Relation Age of Onset    Heart Disease Mother     Heart Disease Brother        Social History:  Social History     Tobacco Use    Smoking status: Every Day     Packs/day: 0.25     Types: Cigarettes    Smokeless tobacco: Never    Tobacco comments:     States she smokes very seldom. Patient smokes 3-4 Black and mild cigars a day. Vaping Use    Vaping Use: Never used   Substance Use Topics    Alcohol use: Not Currently     Comment: Occasionally    Drug use: Not Currently       Allergies: Allergies   Allergen Reactions    Eggs [Egg] Nausea and Vomiting         Review of Systems   Review of Systems   Constitutional:  Positive for fatigue. Negative for chills and fever. HENT: Negative. Negative for congestion, nosebleeds and sore throat. Eyes: Negative. Negative for pain, discharge and visual disturbance. Respiratory: Negative.   Negative for cough, chest tightness and shortness of breath. Cardiovascular:  Negative for chest pain, palpitations and leg swelling. Gastrointestinal:  Negative for abdominal pain, blood in stool, constipation, diarrhea, nausea and vomiting. Endocrine: Negative. Genitourinary: Negative. Negative for difficulty urinating, dysuria, pelvic pain and vaginal bleeding. Musculoskeletal: Negative. Negative for arthralgias, back pain and myalgias. Skin: Negative. Negative for rash and wound. Allergic/Immunologic: Negative. Neurological:  Positive for headaches. Negative for dizziness, syncope, weakness and numbness. Hematological: Negative. Psychiatric/Behavioral: Negative. Negative for agitation, confusion and suicidal ideas. All other systems reviewed and are negative. Physical Exam   Physical Exam  Vitals and nursing note reviewed. Exam conducted with a chaperone present. Constitutional:       Appearance: Normal appearance. She is normal weight. HENT:      Head: Normocephalic and atraumatic. Nose: Nose normal.      Mouth/Throat:      Mouth: Mucous membranes are moist.      Pharynx: Oropharynx is clear. Eyes:      Extraocular Movements: Extraocular movements intact. Conjunctiva/sclera: Conjunctivae normal.      Pupils: Pupils are equal, round, and reactive to light. Cardiovascular:      Rate and Rhythm: Normal rate and regular rhythm. Pulses: Normal pulses. Heart sounds: Normal heart sounds. Pulmonary:      Effort: Pulmonary effort is normal. No respiratory distress. Breath sounds: Normal breath sounds. Abdominal:      General: Abdomen is flat. Bowel sounds are normal. There is no distension. Palpations: Abdomen is soft. Tenderness: There is no abdominal tenderness. There is no guarding. Musculoskeletal:         General: No swelling, tenderness, deformity or signs of injury. Normal range of motion. Cervical back: Normal range of motion and neck supple.       Right lower leg: No edema. Left lower leg: No edema. Skin:     General: Skin is warm and dry. Capillary Refill: Capillary refill takes less than 2 seconds. Findings: No lesion or rash. Neurological:      General: No focal deficit present. Mental Status: She is alert and oriented to person, place, and time. Mental status is at baseline. Cranial Nerves: No cranial nerve deficit. Psychiatric:         Mood and Affect: Mood normal.         Behavior: Behavior normal.         Thought Content: Thought content normal.         Judgment: Judgment normal.       Diagnostic Study Results     Labs -  08/26/22 2024  SARS-COV-2   Collected: 08/25/22 1029  Final result  Specimen: Nasopharyngeal     Specimen source Not provided     SARS-CoV-2 Not detected           08/25/22 1433  SARS-COV-2   Collected: 08/25/22 1029  Final result  Specimen: Miscellaneous sample     SARS-CoV-2 by PCR Not provided            08/25/22 1128  COVID-19 RAPID TEST   Collected: 08/25/22 0941  Final result  Specimen: Nasopharyngeal     COVID-19 rapid test Not Detected                 No results found for this or any previous visit (from the past 12 hour(s)). Radiologic Studies -   No orders to display     CT Results  (Last 48 hours)      None          CXR Results  (Last 48 hours)      None            Medical Decision Making and ED Course   I am the first provider for this patient. I reviewed the vital signs, available nursing notes, past medical history, past surgical history, family history and social history. Vital Signs-Reviewed the patient's vital signs. Patient Vitals for the past 12 hrs:   Temp Pulse Resp BP SpO2   08/25/22 0916 97.9 °F (36.6 °C) 64 16 (!) 170/82 99 %       EKG interpretation:         Records Reviewed: Previous Hospital chart. EMS run report      ED Course:   Initial assessment performed.  The patients presenting problems have been discussed, and they are in agreement with the care plan formulated and outlined with them. I have encouraged them to ask questions as they arise throughout their visit. Orders Placed This Encounter    COVID-19 RAPID TEST     Standing Status:   Standing     Number of Occurrences:   1     Order Specific Question:   Is this test for diagnosis or screening? Answer:   Diagnosis of ill patient     Order Specific Question:   Symptomatic for COVID-19 as defined by CDC? Answer:   Yes     Order Specific Question:   Date of Symptom Onset     Answer:   8/23/2022     Order Specific Question:   Hospitalized for COVID-19? Answer:   No     Order Specific Question:   Admitted to ICU for COVID-19? Answer:   No     Order Specific Question:   Employed in healthcare setting? Answer:   No     Order Specific Question:   Resident in a congregate (group) care setting? Answer:   No     Order Specific Question:   Pregnant? Answer:   No     Order Specific Question:   Previously tested for COVID-19? Answer:   Unknown    butalbital-acetaminophen-caffeine (FIORICET, ESGIC) -40 mg per tablet 2 Tablet                 Provider Notes (Medical Decision Making):   21-year-old female with sinus-like symptoms with URI symptoms status post recent exposure to someone with COVID-19. Patient is also not vaccinated. Vitals are stable not tachypneic or hypoxic. No other source of any other infectious etiology including urine.   Will check for COVID-19 and pain control.    -------------------------------------------------------------------------------------  COVID-19 Risk of decompensation within 24 hours:    Clinical risk score:   CHF,COPD, age1 >60 (+2 points each)   2  CXR (moderate nonlobar +1, 1 lobe +2, bilat severe +2) 0  HR > 100 at disposition (+2 points)    0  O2 sats <92% RA (+4 points)     0  RR >20 (+2 points)      0    TOTAL SCORE 2  - SCORE 0-2: Discharged home with routine follow-up  - SCORE 3-4: Discharged home with pulse oximeter or 24-hour follow-up  - SCORE 0-4: Consider aspirin 81 mg p.o. daily for 2 weeks if not contraindicated or allergy  - SCORE 5-8: Consider chest x-ray CBC CMP troponin and lactate. If troponin and lactate are normal go to low risk. If troponin or D-dimer are lactate are elevated go to high risk  - SCORE 9+: Consider admission. Decadron 6 mg IV. VTE prophylaxis, antibiotics for pneumonia. Check vitamin D levels. Limit fluids and choose pressors early. Proning. High flow nasal cannula. [Includes respiratory distress. Unstable, oxygen need for sats greater than 90% or acute delirium.]           Consults                  Procedures               Disposition       Emergency Department Disposition:  dc      Diagnosis     Clinical Impression: uri covid exposure    Attestations:    Marry Cordero MD    Please note that this dictation was completed with [employer], the computer voice recognition software. Quite often unanticipated grammatical, syntax, homophones, and other interpretive errors are inadvertently transcribed by the computer software. Please disregard these errors. Please excuse any errors that have escaped final proofreading. Thank you.

## 2022-08-25 NOTE — Clinical Note
[facility] EMERGENCY DEPT  [address] [place] [id number]  [phone]    Work/School Note    Date: 8/25/2022    To Whom It May concern:      Elizabeth Strickland was seen and treated today in the emergency room by the following provider(s):  Attending Provider: Keith Bashir MD.      Elizabeth Strickland is excused from work/school on 08/25/22. She is clear to return to work/school on 08/26/22.         Sincerely,          Tiffany Hale MD

## 2022-08-26 LAB
SARS-COV-2, XPLCVT: NOT DETECTED
SOURCE, COVRS: NORMAL

## 2023-01-09 ENCOUNTER — TRANSCRIBE ORDER (OUTPATIENT)
Dept: SCHEDULING | Age: 73
End: 2023-01-09

## 2023-01-09 DIAGNOSIS — Z13.6 ENCOUNTER FOR SCREENING FOR CARDIOVASCULAR DISORDERS: Primary | ICD-10-CM

## 2023-01-18 ENCOUNTER — HOSPITAL ENCOUNTER (OUTPATIENT)
Dept: ULTRASOUND IMAGING | Age: 73
Discharge: HOME | End: 2023-01-18
Attending: INTERNAL MEDICINE
Payer: MEDICARE

## 2023-01-18 DIAGNOSIS — Z13.6 ENCOUNTER FOR SCREENING FOR CARDIOVASCULAR DISORDERS: ICD-10-CM

## 2023-01-18 PROCEDURE — 76706 US ABDL AORTA SCREEN AAA: CPT

## 2023-08-14 ENCOUNTER — TRANSCRIBE ORDERS (OUTPATIENT)
Facility: HOSPITAL | Age: 73
End: 2023-08-14

## 2023-08-14 ENCOUNTER — HOSPITAL ENCOUNTER (OUTPATIENT)
Facility: HOSPITAL | Age: 73
Discharge: HOME | End: 2023-08-17
Payer: MEDICARE

## 2023-08-14 DIAGNOSIS — I10 PRIMARY HYPERTENSION: ICD-10-CM

## 2023-08-14 DIAGNOSIS — I10 PRIMARY HYPERTENSION: Primary | ICD-10-CM

## 2023-08-14 LAB
ANION GAP SERPL CALC-SCNC: 10 MMOL/L (ref 5–15)
BUN SERPL-MCNC: 23 MG/DL (ref 6–20)
BUN/CREAT SERPL: 18 (ref 12–20)
CA-I BLD-MCNC: 10.3 MG/DL (ref 8.5–10.1)
CHLORIDE SERPL-SCNC: 103 MMOL/L (ref 97–108)
CO2 SERPL-SCNC: 25 MMOL/L (ref 21–32)
CREAT SERPL-MCNC: 1.26 MG/DL (ref 0.55–1.02)
GLUCOSE SERPL-MCNC: 94 MG/DL (ref 65–100)
POTASSIUM SERPL-SCNC: 4.8 MMOL/L (ref 3.5–5.1)
SODIUM SERPL-SCNC: 138 MMOL/L (ref 136–145)

## 2023-08-14 PROCEDURE — 36415 COLL VENOUS BLD VENIPUNCTURE: CPT

## 2023-08-14 PROCEDURE — 80048 BASIC METABOLIC PNL TOTAL CA: CPT

## 2024-01-26 ENCOUNTER — APPOINTMENT (OUTPATIENT)
Facility: HOSPITAL | Age: 74
DRG: 690 | End: 2024-01-26
Payer: MEDICARE

## 2024-01-26 ENCOUNTER — HOSPITAL ENCOUNTER (INPATIENT)
Facility: HOSPITAL | Age: 74
LOS: 3 days | Discharge: HOME HEALTH | DRG: 690 | End: 2024-01-30
Attending: EMERGENCY MEDICINE | Admitting: INTERNAL MEDICINE
Payer: MEDICARE

## 2024-01-26 DIAGNOSIS — R41.82 ALTERED MENTAL STATUS, UNSPECIFIED ALTERED MENTAL STATUS TYPE: Primary | ICD-10-CM

## 2024-01-26 DIAGNOSIS — N17.9 AKI (ACUTE KIDNEY INJURY) (HCC): ICD-10-CM

## 2024-01-26 LAB
ALBUMIN SERPL-MCNC: 3.6 G/DL (ref 3.5–5)
ALBUMIN/GLOB SERPL: 0.8 (ref 1.1–2.2)
ALP SERPL-CCNC: 92 U/L (ref 45–117)
ALT SERPL-CCNC: 24 U/L (ref 12–78)
ANION GAP SERPL CALC-SCNC: 7 MMOL/L (ref 5–15)
AST SERPL-CCNC: 15 U/L (ref 15–37)
BASOPHILS # BLD: 0 K/UL (ref 0–0.1)
BASOPHILS NFR BLD: 0 % (ref 0–1)
BILIRUB SERPL-MCNC: 0.4 MG/DL (ref 0.2–1)
BUN SERPL-MCNC: 51 MG/DL (ref 6–20)
BUN/CREAT SERPL: 27 (ref 12–20)
CALCIUM SERPL-MCNC: 9.9 MG/DL (ref 8.5–10.1)
CHLORIDE SERPL-SCNC: 113 MMOL/L (ref 97–108)
CO2 SERPL-SCNC: 21 MMOL/L (ref 21–32)
COMMENT:: NORMAL
CREAT SERPL-MCNC: 1.92 MG/DL (ref 0.55–1.02)
DIFFERENTIAL METHOD BLD: ABNORMAL
EOSINOPHIL # BLD: 0 K/UL (ref 0–0.4)
EOSINOPHIL NFR BLD: 0 % (ref 0–7)
ERYTHROCYTE [DISTWIDTH] IN BLOOD BY AUTOMATED COUNT: 12.7 % (ref 11.5–14.5)
GLOBULIN SER CALC-MCNC: 4.4 G/DL (ref 2–4)
GLUCOSE SERPL-MCNC: 142 MG/DL (ref 65–100)
HCT VFR BLD AUTO: 35.5 % (ref 35–47)
HGB BLD-MCNC: 12.3 G/DL (ref 11.5–16)
IMM GRANULOCYTES # BLD AUTO: 0 K/UL (ref 0–0.04)
IMM GRANULOCYTES NFR BLD AUTO: 0 % (ref 0–0.5)
LYMPHOCYTES # BLD: 1.8 K/UL (ref 0.8–3.5)
LYMPHOCYTES NFR BLD: 24 % (ref 12–49)
MCH RBC QN AUTO: 33.2 PG (ref 26–34)
MCHC RBC AUTO-ENTMCNC: 34.6 G/DL (ref 30–36.5)
MCV RBC AUTO: 95.9 FL (ref 80–99)
MONOCYTES # BLD: 0.5 K/UL (ref 0–1)
MONOCYTES NFR BLD: 7 % (ref 5–13)
NEUTS SEG # BLD: 5.1 K/UL (ref 1.8–8)
NEUTS SEG NFR BLD: 69 % (ref 32–75)
NRBC # BLD: 0 K/UL (ref 0–0.01)
NRBC BLD-RTO: 0 PER 100 WBC
PLATELET # BLD AUTO: 174 K/UL (ref 150–400)
PMV BLD AUTO: 11.3 FL (ref 8.9–12.9)
POTASSIUM SERPL-SCNC: 3.6 MMOL/L (ref 3.5–5.1)
PROT SERPL-MCNC: 8 G/DL (ref 6.4–8.2)
RBC # BLD AUTO: 3.7 M/UL (ref 3.8–5.2)
SODIUM SERPL-SCNC: 141 MMOL/L (ref 136–145)
SPECIMEN HOLD: NORMAL
WBC # BLD AUTO: 7.5 K/UL (ref 3.6–11)

## 2024-01-26 PROCEDURE — 99285 EMERGENCY DEPT VISIT HI MDM: CPT

## 2024-01-26 PROCEDURE — 93005 ELECTROCARDIOGRAM TRACING: CPT | Performed by: EMERGENCY MEDICINE

## 2024-01-26 PROCEDURE — 70450 CT HEAD/BRAIN W/O DYE: CPT

## 2024-01-26 PROCEDURE — 36415 COLL VENOUS BLD VENIPUNCTURE: CPT

## 2024-01-26 PROCEDURE — 2580000003 HC RX 258: Performed by: EMERGENCY MEDICINE

## 2024-01-26 PROCEDURE — 6360000002 HC RX W HCPCS: Performed by: EMERGENCY MEDICINE

## 2024-01-26 PROCEDURE — 96372 THER/PROPH/DIAG INJ SC/IM: CPT

## 2024-01-26 PROCEDURE — 85025 COMPLETE CBC W/AUTO DIFF WBC: CPT

## 2024-01-26 PROCEDURE — 80053 COMPREHEN METABOLIC PANEL: CPT

## 2024-01-26 PROCEDURE — 71045 X-RAY EXAM CHEST 1 VIEW: CPT

## 2024-01-26 RX ORDER — HALOPERIDOL 5 MG/ML
2 INJECTION INTRAMUSCULAR ONCE
Status: COMPLETED | OUTPATIENT
Start: 2024-01-26 | End: 2024-01-26

## 2024-01-26 RX ORDER — 0.9 % SODIUM CHLORIDE 0.9 %
1000 INTRAVENOUS SOLUTION INTRAVENOUS ONCE
Status: COMPLETED | OUTPATIENT
Start: 2024-01-26 | End: 2024-01-26

## 2024-01-26 RX ADMIN — SODIUM CHLORIDE 1000 ML: 9 INJECTION, SOLUTION INTRAVENOUS at 22:54

## 2024-01-26 RX ADMIN — HALOPERIDOL LACTATE 2 MG: 5 INJECTION, SOLUTION INTRAMUSCULAR at 20:48

## 2024-01-26 RX ADMIN — HALOPERIDOL LACTATE 2 MG: 5 INJECTION, SOLUTION INTRAMUSCULAR at 22:02

## 2024-01-27 ENCOUNTER — APPOINTMENT (OUTPATIENT)
Facility: HOSPITAL | Age: 74
DRG: 690 | End: 2024-01-27
Payer: MEDICARE

## 2024-01-27 PROBLEM — R41.0 ACUTE DELIRIUM: Status: ACTIVE | Noted: 2020-11-10

## 2024-01-27 LAB
ALBUMIN SERPL-MCNC: 3 G/DL (ref 3.5–5)
ALBUMIN/GLOB SERPL: 0.9 (ref 1.1–2.2)
ALP SERPL-CCNC: 79 U/L (ref 45–117)
ALT SERPL-CCNC: 20 U/L (ref 12–78)
AMMONIA PLAS-SCNC: 27 UMOL/L
ANION GAP SERPL CALC-SCNC: 9 MMOL/L (ref 5–15)
APAP SERPL-MCNC: <2 UG/ML (ref 10–30)
AST SERPL-CCNC: 16 U/L (ref 15–37)
BASOPHILS # BLD: 0 K/UL (ref 0–0.1)
BASOPHILS NFR BLD: 1 % (ref 0–1)
BILIRUB SERPL-MCNC: 0.2 MG/DL (ref 0.2–1)
BUN SERPL-MCNC: 45 MG/DL (ref 6–20)
BUN/CREAT SERPL: 27 (ref 12–20)
CALCIUM SERPL-MCNC: 8.5 MG/DL (ref 8.5–10.1)
CHLORIDE SERPL-SCNC: 114 MMOL/L (ref 97–108)
CO2 SERPL-SCNC: 22 MMOL/L (ref 21–32)
COMMENT:: NORMAL
COMMENT:: NORMAL
CREAT SERPL-MCNC: 1.65 MG/DL (ref 0.55–1.02)
DIFFERENTIAL METHOD BLD: ABNORMAL
EKG ATRIAL RATE: 88 BPM
EKG DIAGNOSIS: NORMAL
EKG P AXIS: 63 DEGREES
EKG P-R INTERVAL: 142 MS
EKG Q-T INTERVAL: 378 MS
EKG QRS DURATION: 88 MS
EKG QTC CALCULATION (BAZETT): 457 MS
EKG R AXIS: -31 DEGREES
EKG T AXIS: 23 DEGREES
EKG VENTRICULAR RATE: 88 BPM
EOSINOPHIL # BLD: 0.1 K/UL (ref 0–0.4)
EOSINOPHIL NFR BLD: 2 % (ref 0–7)
ERYTHROCYTE [DISTWIDTH] IN BLOOD BY AUTOMATED COUNT: 12.8 % (ref 11.5–14.5)
EST. AVERAGE GLUCOSE BLD GHB EST-MCNC: 128 MG/DL
ETHANOL SERPL-MCNC: <10 MG/DL (ref 0–0.08)
FOLATE SERPL-MCNC: 9.3 NG/ML (ref 5–21)
GLOBULIN SER CALC-MCNC: 3.5 G/DL (ref 2–4)
GLUCOSE BLD STRIP.AUTO-MCNC: 100 MG/DL (ref 65–117)
GLUCOSE BLD STRIP.AUTO-MCNC: 114 MG/DL (ref 65–117)
GLUCOSE BLD STRIP.AUTO-MCNC: 114 MG/DL (ref 65–117)
GLUCOSE SERPL-MCNC: 98 MG/DL (ref 65–100)
HBA1C MFR BLD: 6.1 % (ref 4–5.6)
HCT VFR BLD AUTO: 33.2 % (ref 35–47)
HGB BLD-MCNC: 11.1 G/DL (ref 11.5–16)
IMM GRANULOCYTES # BLD AUTO: 0 K/UL (ref 0–0.04)
IMM GRANULOCYTES NFR BLD AUTO: 0 % (ref 0–0.5)
LYMPHOCYTES # BLD: 2.1 K/UL (ref 0.8–3.5)
LYMPHOCYTES NFR BLD: 43 % (ref 12–49)
MAGNESIUM SERPL-MCNC: 2.5 MG/DL (ref 1.6–2.4)
MCH RBC QN AUTO: 32.8 PG (ref 26–34)
MCHC RBC AUTO-ENTMCNC: 33.4 G/DL (ref 30–36.5)
MCV RBC AUTO: 98.2 FL (ref 80–99)
MONOCYTES # BLD: 0.5 K/UL (ref 0–1)
MONOCYTES NFR BLD: 11 % (ref 5–13)
NEUTS SEG # BLD: 2.1 K/UL (ref 1.8–8)
NEUTS SEG NFR BLD: 43 % (ref 32–75)
NRBC # BLD: 0 K/UL (ref 0–0.01)
NRBC BLD-RTO: 0 PER 100 WBC
PHOSPHATE SERPL-MCNC: 3.6 MG/DL (ref 2.6–4.7)
PLATELET # BLD AUTO: 135 K/UL (ref 150–400)
PMV BLD AUTO: 11.5 FL (ref 8.9–12.9)
POTASSIUM SERPL-SCNC: 3.2 MMOL/L (ref 3.5–5.1)
PROT SERPL-MCNC: 6.5 G/DL (ref 6.4–8.2)
RBC # BLD AUTO: 3.38 M/UL (ref 3.8–5.2)
SALICYLATES SERPL-MCNC: 6.3 MG/DL (ref 2.8–20)
SERVICE CMNT-IMP: NORMAL
SODIUM SERPL-SCNC: 145 MMOL/L (ref 136–145)
SPECIMEN HOLD: NORMAL
SPECIMEN HOLD: NORMAL
TROPONIN I SERPL HS-MCNC: 12 NG/L (ref 0–51)
TSH SERPL DL<=0.05 MIU/L-ACNC: 2.57 UIU/ML (ref 0.36–3.74)
VIT B12 SERPL-MCNC: 493 PG/ML (ref 193–986)
WBC # BLD AUTO: 4.9 K/UL (ref 3.6–11)

## 2024-01-27 PROCEDURE — 2060000000 HC ICU INTERMEDIATE R&B

## 2024-01-27 PROCEDURE — 82746 ASSAY OF FOLIC ACID SERUM: CPT

## 2024-01-27 PROCEDURE — 93010 ELECTROCARDIOGRAM REPORT: CPT | Performed by: INTERNAL MEDICINE

## 2024-01-27 PROCEDURE — 36415 COLL VENOUS BLD VENIPUNCTURE: CPT

## 2024-01-27 PROCEDURE — 80053 COMPREHEN METABOLIC PANEL: CPT

## 2024-01-27 PROCEDURE — 6360000002 HC RX W HCPCS: Performed by: INTERNAL MEDICINE

## 2024-01-27 PROCEDURE — 83735 ASSAY OF MAGNESIUM: CPT

## 2024-01-27 PROCEDURE — 71250 CT THORAX DX C-: CPT

## 2024-01-27 PROCEDURE — 84100 ASSAY OF PHOSPHORUS: CPT

## 2024-01-27 PROCEDURE — 84443 ASSAY THYROID STIM HORMONE: CPT

## 2024-01-27 PROCEDURE — 82962 GLUCOSE BLOOD TEST: CPT

## 2024-01-27 PROCEDURE — 80143 DRUG ASSAY ACETAMINOPHEN: CPT

## 2024-01-27 PROCEDURE — 85025 COMPLETE CBC W/AUTO DIFF WBC: CPT

## 2024-01-27 PROCEDURE — 6370000000 HC RX 637 (ALT 250 FOR IP): Performed by: INTERNAL MEDICINE

## 2024-01-27 PROCEDURE — 82077 ASSAY SPEC XCP UR&BREATH IA: CPT

## 2024-01-27 PROCEDURE — 83036 HEMOGLOBIN GLYCOSYLATED A1C: CPT

## 2024-01-27 PROCEDURE — 82140 ASSAY OF AMMONIA: CPT

## 2024-01-27 PROCEDURE — 2580000003 HC RX 258: Performed by: INTERNAL MEDICINE

## 2024-01-27 PROCEDURE — 80179 DRUG ASSAY SALICYLATE: CPT

## 2024-01-27 PROCEDURE — 74176 CT ABD & PELVIS W/O CONTRAST: CPT

## 2024-01-27 PROCEDURE — 82607 VITAMIN B-12: CPT

## 2024-01-27 PROCEDURE — 84484 ASSAY OF TROPONIN QUANT: CPT

## 2024-01-27 RX ORDER — ACETAMINOPHEN 650 MG/1
650 SUPPOSITORY RECTAL EVERY 6 HOURS PRN
Status: DISCONTINUED | OUTPATIENT
Start: 2024-01-27 | End: 2024-01-30 | Stop reason: HOSPADM

## 2024-01-27 RX ORDER — SODIUM CHLORIDE 9 MG/ML
INJECTION, SOLUTION INTRAVENOUS PRN
Status: DISCONTINUED | OUTPATIENT
Start: 2024-01-27 | End: 2024-01-30 | Stop reason: HOSPADM

## 2024-01-27 RX ORDER — INSULIN LISPRO 100 [IU]/ML
0-8 INJECTION, SOLUTION INTRAVENOUS; SUBCUTANEOUS
Status: DISCONTINUED | OUTPATIENT
Start: 2024-01-27 | End: 2024-01-28

## 2024-01-27 RX ORDER — POTASSIUM CHLORIDE 750 MG/1
40 TABLET, FILM COATED, EXTENDED RELEASE ORAL PRN
Status: DISCONTINUED | OUTPATIENT
Start: 2024-01-27 | End: 2024-01-28

## 2024-01-27 RX ORDER — HEPARIN SODIUM 5000 [USP'U]/ML
5000 INJECTION, SOLUTION INTRAVENOUS; SUBCUTANEOUS EVERY 8 HOURS SCHEDULED
Status: DISCONTINUED | OUTPATIENT
Start: 2024-01-27 | End: 2024-01-28

## 2024-01-27 RX ORDER — NICOTINE 21 MG/24HR
1 PATCH, TRANSDERMAL 24 HOURS TRANSDERMAL DAILY
Status: DISCONTINUED | OUTPATIENT
Start: 2024-01-27 | End: 2024-01-30 | Stop reason: HOSPADM

## 2024-01-27 RX ORDER — ROSUVASTATIN CALCIUM 10 MG/1
10 TABLET, COATED ORAL DAILY
Status: DISCONTINUED | OUTPATIENT
Start: 2024-01-27 | End: 2024-01-30 | Stop reason: HOSPADM

## 2024-01-27 RX ORDER — ONDANSETRON 2 MG/ML
4 INJECTION INTRAMUSCULAR; INTRAVENOUS EVERY 6 HOURS PRN
Status: DISCONTINUED | OUTPATIENT
Start: 2024-01-27 | End: 2024-01-30 | Stop reason: HOSPADM

## 2024-01-27 RX ORDER — CETIRIZINE HYDROCHLORIDE 10 MG/1
10 TABLET ORAL DAILY
Status: DISCONTINUED | OUTPATIENT
Start: 2024-01-27 | End: 2024-01-30 | Stop reason: HOSPADM

## 2024-01-27 RX ORDER — INSULIN LISPRO 100 [IU]/ML
0-4 INJECTION, SOLUTION INTRAVENOUS; SUBCUTANEOUS NIGHTLY
Status: DISCONTINUED | OUTPATIENT
Start: 2024-01-27 | End: 2024-01-28

## 2024-01-27 RX ORDER — MAGNESIUM SULFATE IN WATER 40 MG/ML
2000 INJECTION, SOLUTION INTRAVENOUS PRN
Status: DISCONTINUED | OUTPATIENT
Start: 2024-01-27 | End: 2024-01-28

## 2024-01-27 RX ORDER — SODIUM CHLORIDE 0.9 % (FLUSH) 0.9 %
5-40 SYRINGE (ML) INJECTION EVERY 12 HOURS SCHEDULED
Status: DISCONTINUED | OUTPATIENT
Start: 2024-01-27 | End: 2024-01-30 | Stop reason: HOSPADM

## 2024-01-27 RX ORDER — SODIUM CHLORIDE, SODIUM LACTATE, POTASSIUM CHLORIDE, CALCIUM CHLORIDE 600; 310; 30; 20 MG/100ML; MG/100ML; MG/100ML; MG/100ML
INJECTION, SOLUTION INTRAVENOUS CONTINUOUS
Status: DISCONTINUED | OUTPATIENT
Start: 2024-01-27 | End: 2024-01-28

## 2024-01-27 RX ORDER — POTASSIUM CHLORIDE 7.45 MG/ML
10 INJECTION INTRAVENOUS PRN
Status: DISCONTINUED | OUTPATIENT
Start: 2024-01-27 | End: 2024-01-28

## 2024-01-27 RX ORDER — POLYETHYLENE GLYCOL 3350 17 G/17G
17 POWDER, FOR SOLUTION ORAL DAILY PRN
Status: DISCONTINUED | OUTPATIENT
Start: 2024-01-27 | End: 2024-01-30 | Stop reason: HOSPADM

## 2024-01-27 RX ORDER — ONDANSETRON 4 MG/1
4 TABLET, ORALLY DISINTEGRATING ORAL EVERY 8 HOURS PRN
Status: DISCONTINUED | OUTPATIENT
Start: 2024-01-27 | End: 2024-01-30 | Stop reason: HOSPADM

## 2024-01-27 RX ORDER — METOPROLOL SUCCINATE 50 MG/1
50 TABLET, EXTENDED RELEASE ORAL DAILY
Status: DISCONTINUED | OUTPATIENT
Start: 2024-01-27 | End: 2024-01-30 | Stop reason: HOSPADM

## 2024-01-27 RX ORDER — SODIUM CHLORIDE 0.9 % (FLUSH) 0.9 %
5-40 SYRINGE (ML) INJECTION PRN
Status: DISCONTINUED | OUTPATIENT
Start: 2024-01-27 | End: 2024-01-30 | Stop reason: HOSPADM

## 2024-01-27 RX ORDER — ASPIRIN 81 MG/1
81 TABLET, CHEWABLE ORAL DAILY
Status: DISCONTINUED | OUTPATIENT
Start: 2024-01-27 | End: 2024-01-30 | Stop reason: HOSPADM

## 2024-01-27 RX ORDER — ACETAMINOPHEN 325 MG/1
650 TABLET ORAL EVERY 6 HOURS PRN
Status: DISCONTINUED | OUTPATIENT
Start: 2024-01-27 | End: 2024-01-30 | Stop reason: HOSPADM

## 2024-01-27 RX ADMIN — SODIUM CHLORIDE, POTASSIUM CHLORIDE, SODIUM LACTATE AND CALCIUM CHLORIDE: 600; 310; 30; 20 INJECTION, SOLUTION INTRAVENOUS at 09:27

## 2024-01-27 RX ADMIN — ROSUVASTATIN 10 MG: 10 TABLET, FILM COATED ORAL at 09:27

## 2024-01-27 RX ADMIN — CETIRIZINE HYDROCHLORIDE 10 MG: 10 TABLET, FILM COATED ORAL at 09:27

## 2024-01-27 RX ADMIN — HEPARIN SODIUM 5000 UNITS: 5000 INJECTION INTRAVENOUS; SUBCUTANEOUS at 16:49

## 2024-01-27 RX ADMIN — SODIUM CHLORIDE, PRESERVATIVE FREE 10 ML: 5 INJECTION INTRAVENOUS at 09:34

## 2024-01-27 RX ADMIN — ASPIRIN 81 MG: 81 TABLET, CHEWABLE ORAL at 09:27

## 2024-01-27 NOTE — ED PROVIDER NOTES
[facility] EMERGENCY DEP  EMERGENCY DEPARTMENT ENCOUNTER      Pt Name: Brenna Wheeler  MRN: [id number]  Birthdate 1950  Date of evaluation: 1/26/2024  Provider: Fabricio Larose MD    CHIEF COMPLAINT       Chief Complaint   Patient presents with    Altered Mental Status     ED visit d/t AMS - onset of sxs, 3 days ago - daughter concerned with worsening sxs at home - arrived with bilat hand cuffs, removed per  -  per EMS;;         HISTORY OF PRESENT ILLNESS    This is a 73-year-old female with past ministry of diabetes, hypertension, hyperlipidemia presented to the ER for altered mental status for the past 3 days.  Apparently patient had had history of UTIs in the past.  This is per report from EMS.  Patient arrived by EMS with a  with handcuffs placed.  Patient is not cooperative with my exam or history.  Patient yells at the nursing staff to get away from her.  Will allow us take a blood pressure.  She will move her extremities and becomes agitated when I lifted off the blanket view her extremities and see if she is moving it.  She will refuse to answer questions for me but clearly states and speaks when she is angry.  She would not answer what her name is, she will not answer where she is.  She tells me to leave her alone.  She does not answer questions regarding any pain issues.  She will let me to palpate her abdomen which is soft and and does not grimace to palpate.    Nursing staff called family, reports that she is been progressively worsening since Saturday, hallucinating dead relatives.  Pacing.  Increasing agitated behavior.  Apparently has decreased urine output over the past day                Review of External Medical Records:     Nursing Notes were reviewed.    REVIEW OF SYSTEMS       Review of Systems   Unable to perform ROS: Other       Except as noted above the remainder of the review of systems was reviewed and negative.       PAST MEDICAL HISTORY     Past

## 2024-01-27 NOTE — CONSULTS
NEPHROLOGY CONSULT NOTE     Patient: Brenna Wheeler MRN: [id number]  PCP: Marlon Abbott Sr., MD   :     1950  Age:   73 y.o.  Sex:  female      Referring physician: Alberto Wheeler MD  Reason for consultation: 73 y.o. female with Acute delirium [R41.0]  NISHA (acute kidney injury) (HCC) [N17.9]  Altered mental status, unspecified altered mental status type [R41.82] complicated by NISHA   Admission Date: 2024  7:57 PM  LOS: 0 days      ASSESSMENT and PLAN :   NISHA on CKD:  - from IVVD + ARB, thiazide and metformin  - no obstruction on CT scan  - cont IVF  - daily labs and I/Os    CKD 3a:  - baseline Cr 1.2    AMS:  - neg CT, MRI pending  - infectious w/up pending    Hypovolemia:  - cont IVF    HTN  DM2  Mild thrombocytopenia  Obseisty  HLD     Active Problems / Assessment AAActive  :   Principal Problem:    Acute delirium  Resolved Problems:    * No resolved hospital problems. *       Subjective:   HPI: Brenna Wheeler is a 73 y.o.  female who has been admitted to the hospital for AMS.  She has a hx of DM2, HTN, HLD, CKD with baseline Cr around 1.2 back in .  Found to have a Cr of 1.9.  CT head neg for acute issues.  CT C/A/P neg, CXR clear. On losartan, metformin, maxide at home.  Per records, pt not eating, had decreased UOP over the past several days.  On IVF now.  Poor historian, unable to obtain any hx.       Past Medical Hx:   Past Medical History:   Diagnosis Date    Chronic osteoarthritis 2020    Diabetes (HCC)     Elevated glucose 2020    Essential (primary) hypertension 2020    Menopause     Mixed hyperlipidemia 2020        Past Surgical Hx:     Past Surgical History:   Procedure Laterality Date    BREAST BIOPSY Left 2019    GYN         Medications:  Prior to Admission medications    Medication Sig Start Date End Date Taking? Authorizing Provider   aspirin 81 MG chewable tablet Take 81 mg by mouth daily    Automatic Reconciliation, Ar  \"UCRY\"    I have reviewed the following:   All pertinent labs, microbiology data, radiology imaging for my assessment             Thank you for allowing us to participate in the care of this patient.   We will follow patient. Please don’t hesitate to call with any questions    Magdiel Harmon MD  1/27/2024    [facility] [address] [place] [address]  Phone - [phone]   Fax - [phone]  [email or web address]

## 2024-01-27 NOTE — ED NOTES
Patient refused to change into gown. Patient refusing to leave urine sample. Patient refusing vitals.

## 2024-01-27 NOTE — PROGRESS NOTES
Patient arrived to unit via stretcher. Pt refusing to change into gown. Pt refusing purwick and bath. Pt refusing all care at the moment. Will try again later.

## 2024-01-27 NOTE — ED NOTES
Pt signed out to me at 11pm  73 y.o. female presented with decline in mental status since Monday. Required sedation for workup. Labs show NISHA. CT head neg. CXR clear.    Awaiting UA. Will require admission regardless.    Perfect Serve Consult for Admission  2:52 AM    ED Room Number: I/HI  Patient Name and age:  Brenna Wheeler 73 y.o.  female  Working Diagnosis:   1. Altered mental status, unspecified altered mental status type    2. NISHA (acute kidney injury) (HCC)        COVID-19 Suspicion: No  Sepsis present:  No  Reassessment needed: No  Code Status:  Full Code  Readmission: No  Isolation Requirements: no  Recommended Level of Care: med/surg  Department: [facility] Adult ED - [phone]  73 y.o. female presented with AMS, NISHA.   UA pending. Had to be sedated for workup.     Jorge Luis Horne,   01/27/24 0252

## 2024-01-27 NOTE — PROGRESS NOTES
1820: Patient is refusing to be clean again. Pt is refusing lab work. Patient is asking for daughter to be call to take her home.  1825: RN called patient's daughter (Stephanie), she spoke with patient and asked her to let us take care of her. Patient still refusing all type of care from this RN. Then Stephanie asked RN to proceed with obtaining patient's lab and bathing pt. Pt become agitated and asked RN to leave. I explained to Stephanie that RN cannot forced patient to participate in care. Pt educated on the importance of obtaining lab and hospital protocol for admissions. Pt still refusing care at the moment. Will reevaluate at a later moment when pt is more willing to participate with RN.   1835: MD notified.

## 2024-01-27 NOTE — ED NOTES
Called and spoke with pt's daughter - reports states, a gradual decline mental status since Monday - periods of hallucinations / pacing at home / insomnia and decreased urinary output at home leading to EMS call this evening;;  Thuan KINCAID made aware;;

## 2024-01-27 NOTE — ED NOTES
ED TO INPATIENT SBAR HANDOFF    Patient Name: Brenna Wheeler   :  1950  73 y.o.   MRN:  [id number]  ED Room #:  ER06/06  Family/Caregiver Present no   Restraints no   Sitter no   Sepsis Risk Score Sepsis Risk Score: 0.83    Situation  Code Status: Full Code     Allergies: Egg solids, whole  Weight: No data found.  Arrived from: home  Chief Complaint:   Chief Complaint   Patient presents with    Altered Mental Status     ED visit d/t AMS - onset of sxs, 3 days ago - daughter concerned with worsening sxs at home - arrived with bilat hand cuffs, removed per  -  per EMS;;     Hospital Problem/Diagnosis:  Principal Problem:    Acute delirium  Resolved Problems:    * No resolved hospital problems. *    Imaging:   CT ABDOMEN PELVIS WO CONTRAST Additional Contrast? Radiologist Recommendation   Final Result   No acute abnormality is identified in the chest, abdomen, or pelvis.         CT CHEST WO CONTRAST   Final Result   No acute abnormality is identified in the chest, abdomen, or pelvis.         XR CHEST PORTABLE   Final Result   No acute cardiopulmonary disease.          CT HEAD WO CONTRAST   Final Result   No acute intracranial hemorrhage, mass or infarct.           MRI BRAIN WO CONTRAST    (Results Pending)     Abnormal labs:   Abnormal Labs Reviewed   COMPREHENSIVE METABOLIC PANEL - Abnormal; Notable for the following components:       Result Value    Chloride 113 (*)     Glucose 142 (*)     BUN 51 (*)     Creatinine 1.92 (*)     Bun/Cre Ratio 27 (*)     Est, Glom Filt Rate 27 (*)     Globulin 4.4 (*)     Albumin/Globulin Ratio 0.8 (*)     All other components within normal limits   CBC WITH AUTO DIFFERENTIAL - Abnormal; Notable for the following components:    RBC 3.70 (*)     All other components within normal limits   CBC WITH AUTO DIFFERENTIAL - Abnormal; Notable for the following components:    RBC 3.38 (*)     Hemoglobin 11.1 (*)     Hematocrit 33.2 (*)     Platelets 135 (*)     All

## 2024-01-27 NOTE — H&P
[facility]  HISTORY AND PHYSICAL    Name:  OLI IRIZARRY  MR#:  [id number]  :  1950  ACCOUNT #:  [id number]  ADMIT DATE:  2024      The patient was seen, evaluated, and admitted by me on 2024.    PRIMARY CARE PHYSICIAN:  Marlon Abbott MD    SOURCE OF INFORMATION:  Review of ED electronic medical records.    CHIEF COMPLAINT:  Change in mental status.    HISTORY OF PRESENT ILLNESS:  This is a 73-year-old woman with past medical history significant for type 2 diabetes, hypertension, and dyslipidemia; presented at the emergency room with change in mental status.  The patient's daughter reported that this started a couple of days ago and progressively getting worse.  The patient is also having hallucinations and not sleeping, poor appetite, and decreased urinary output.  EMS was called and the patient was brought to the emergency room for further evaluation.  When the patient arrived at the emergency room, the patient is agitated and restless and will not allow the lab work.  The patient received Haldol before evaluation can be performed in the emergency room.  The patient had a similar presentation and was admitted to the hospital from 2020 to 2020.  Evaluation for stroke was negative.  The patient was subsequently referred to outpatient followup by Psychiatry for further evaluation of possible psychosis.  When the patient arrived at the emergency room, CT scan of the head was obtained.  This was negative for acute pathology.  The patient was subsequently referred to the hospitalist service for admission.    PAST MEDICAL HISTORY:  1.  Hypertension.  2.  Type 2 diabetes.  3.  Dyslipidemia.  4.  Chronic kidney disease.    ALLERGIES:  THE PATIENT IS ALLERGIC TO EGG PRODUCTS.    MEDICATIONS:  1.  Crestor 10 mg daily.  2.  Toprol-XL 50 mg daily.  3.  Zyrtec 10 mg daily.  4.  Aspirin 81 mg daily.  5.  Glucophage 500 mg twice daily.  6.  Losartan 100 mg daily.  7.   total protein 8.0, albumin level 3.6, globulin 4.4.    ASSESSMENT:  1.  Acute delirium.  2.  Obesity.  3.  Acute-on-chronic kidney disease stage III.  4.  Type 2 diabetes.  5.  Hypertension.  6.  Dyslipidemia.  7.  Thrombocytopenia.  8.  Tobacco abuse.    PLAN:  1.  Acute delirium.  We will admit the patient for further evaluation and treatment.  This is most likely due to metabolic event.  We will identify and treat the underlying etiological factors.  We will check urinalysis.  CT scan of the head is negative for acute pathology.  We will obtain MRI of the brain to evaluate the patient for stroke as a possible cause of acute delirium.  We will check ammonia level, acetaminophen level, salicylate level, B12 and folic acid levels.  We will also carry out fluid therapy as volume depletion maybe contributing to the patient's acute delirium.  We will obtain noncontrast CT scan of the chest, abdomen and pelvis to evaluate the patient for sources of infection as a possible cause of acute delirium and the patient will be closely monitored.  2.  Obesity:  The patient presented with BMI of 34.02.  The patient will benefit from lifestyle modification including diet and exercise to promote weight loss.  3.  Acute-on-chronic kidney disease stage III.  We will carry out fluid therapy and monitor the patient's renal function.  4.  Type 2 diabetes:  We will place the patient on sliding scale with insulin coverage.  We will check hemoglobin A1c level.  We will hold Glucophage till the time of discharge from the hospital.  5.  Hypertension:  We will continue Toprol and monitor the patient's blood pressure closely.  6.  Dyslipidemia:  We will continue Crestor.  7.  Thrombocytopenia:  This is mild.  The patient is asymptomatic.  We will continue to monitor.  8.  Tobacco abuse:  The patient advised to quit smoking.  We will place the patient on Nicoderm patch.    OTHER ISSUES:  Code status:  The patient is a full code.  We will place

## 2024-01-28 LAB
GLUCOSE BLD STRIP.AUTO-MCNC: 87 MG/DL (ref 65–117)
SERVICE CMNT-IMP: NORMAL

## 2024-01-28 PROCEDURE — 2060000000 HC ICU INTERMEDIATE R&B

## 2024-01-28 PROCEDURE — 82962 GLUCOSE BLOOD TEST: CPT

## 2024-01-28 PROCEDURE — 2500000003 HC RX 250 WO HCPCS: Performed by: FAMILY MEDICINE

## 2024-01-28 PROCEDURE — 2580000003 HC RX 258: Performed by: INTERNAL MEDICINE

## 2024-01-28 PROCEDURE — 2580000003 HC RX 258: Performed by: FAMILY MEDICINE

## 2024-01-28 PROCEDURE — 6360000002 HC RX W HCPCS: Performed by: FAMILY MEDICINE

## 2024-01-28 RX ORDER — HEPARIN SODIUM 5000 [USP'U]/ML
5000 INJECTION, SOLUTION INTRAVENOUS; SUBCUTANEOUS 2 TIMES DAILY
Status: DISCONTINUED | OUTPATIENT
Start: 2024-01-28 | End: 2024-01-30 | Stop reason: HOSPADM

## 2024-01-28 RX ORDER — DEXTROSE MONOHYDRATE, SODIUM CHLORIDE, AND POTASSIUM CHLORIDE 50; 1.49; 4.5 G/1000ML; G/1000ML; G/1000ML
INJECTION, SOLUTION INTRAVENOUS CONTINUOUS
Status: DISCONTINUED | OUTPATIENT
Start: 2024-01-28 | End: 2024-01-30

## 2024-01-28 RX ADMIN — POTASSIUM CHLORIDE, DEXTROSE MONOHYDRATE AND SODIUM CHLORIDE: 150; 5; 450 INJECTION, SOLUTION INTRAVENOUS at 13:25

## 2024-01-28 RX ADMIN — WATER 1000 MG: 1 INJECTION INTRAMUSCULAR; INTRAVENOUS; SUBCUTANEOUS at 13:24

## 2024-01-28 RX ADMIN — SODIUM CHLORIDE, PRESERVATIVE FREE 10 ML: 5 INJECTION INTRAVENOUS at 20:22

## 2024-01-28 NOTE — PROGRESS NOTES
Refusing meds, labs  Cont present care  Repeat labs in AM  Will see tomorrow  Call with any issues

## 2024-01-28 NOTE — BSMART NOTE
BSMART Liaison Team Note     LOS:  1 day     Patient goal(s) for today: Patient goal(s) for today: take medications as prescribed, make needs known in an appropriate manner  BSMART Liaison team focus/goals: assess needs, provide support and education, coordinate care    Progress note: Liaison met f/f with pt in her room on the medical floor of [facility]. Pt resting in bed, watching tv upon arrival. She presents in no apparent distress. She is alert, calm, cooperative, slightly guarded, but smiling with good eye contact. Pt says that she is not depressed or anxious. She says that she is not stressed. Pt reports living with her daughter and indicates that they have a good relationship. Pt says, \"I don't bother nobody.\" Pt denies any SI/HI/AH/VH. She denies any previous suicide attempts. She denies any MH hx. She knows her name and that she's at [facility]. She knows the month and the year. She denies feeling physically sick. Pt denies any further issues or concerns. Liaison will continue to monitor and to support.     Barriers to Discharge: Medical clearance  Guns in the home: No    Outpatient provider(s): None reported  Insurance info/prescription coverage: MEDICARE PART A AND B     Diagnosis: Please defer to psychiatric consult note for diagnosis    Plan: Please defer to psychiatric consult note for disposition and recommendations..   Follow up Psych Consult placed? Yes.   Psychiatrist updated? No    Participating treatment team members: Brenna Wheeler, Jeremias Motley LCSW

## 2024-01-28 NOTE — PROGRESS NOTES
Report received Pt refused vitals signs. Pt refused to answer any questions. When asked regarding her being clean pt refused to allow nurse to check her and stated \" I don't need you to check me I don't pee on myself\" Off going nurse stated she hasn't peed for her since arriving on the floor     2039  While in another room pt jumped up and was assisted to the bathroom by the charge nurse. Pt informed charge nurse \" close the fucking door  I don't need you.\"     2110  Pt resting with eyes closed no distress noted     2239  Rounds made no distress noted     0132  Pt  allowed her vitals to be taken but refused blood draw    0400  Pt resting with eyes closed no distress noted call bell within reach     0751  Report given to Malini oviedo SBAR update on the fact pt refused all services only peed once in a 12 hour shift and refused care

## 2024-01-28 NOTE — PROGRESS NOTES
0845 - patient refused all medications. Educated on importance of medication compliance, pt stated \"I don't need that there is nothing wrong with me\"

## 2024-01-28 NOTE — PROGRESS NOTES
Hospitalist Progress Note  Alberto Wheeler MD  Answering service: [phone] OR [phone] from in house phone        Date of Service:  2024  NAME:  Brenna Wheeler  :  1950  MRN:  [id number]      Admission Summary:   This is a 73-year-old woman with past medical history significant for type 2 diabetes, hypertension, and dyslipidemia; presented at the emergency room with change in mental status. The patient's daughter reported that this started a couple of days ago and progressively getting worse. The patient is also having hallucinations and not sleeping, poor appetite, and decreased urinary output. EMS was called and the patient was brought to the emergency room for further evaluation. When the patient arrived at the emergency room, the patient is agitated and restless and will not allow the lab work. The patient received Haldol before evaluation can be performed in the emergency room. The patient had a similar presentation and was admitted to the hospital from 2020 to 2020. Evaluation for stroke was negative. The patient was subsequently referred to outpatient followup by Psychiatry for further evaluation of possible psychosis. When the patient arrived at the emergency room, CT scan of the head was obtained. This was negative for acute pathology. The patient was subsequently referred to the hospitalist service for admission.        Interval history / Subjective:   Patient seen and examined; she remains depressed and refusing nearly all labs and treatments-  Called her daughter and placed psych consult  I think Daughter wants patient in long term care- not safe to live alone anymore     Assessment & Plan:      1.  Acute delirium-likely multifactorial.  Suspected UTI on baseline dementia  Unable to get urinalysis sent due to patient's noncooperative status  Brain MRI also unable to be completed due

## 2024-01-29 ENCOUNTER — APPOINTMENT (OUTPATIENT)
Facility: HOSPITAL | Age: 74
DRG: 690 | End: 2024-01-29
Payer: MEDICARE

## 2024-01-29 LAB
ALBUMIN SERPL-MCNC: 3 G/DL (ref 3.5–5)
ANION GAP SERPL CALC-SCNC: 1 MMOL/L (ref 5–15)
BUN SERPL-MCNC: 27 MG/DL (ref 6–20)
BUN/CREAT SERPL: 22 (ref 12–20)
CALCIUM SERPL-MCNC: 8.5 MG/DL (ref 8.5–10.1)
CHLORIDE SERPL-SCNC: 115 MMOL/L (ref 97–108)
CO2 SERPL-SCNC: 27 MMOL/L (ref 21–32)
CREAT SERPL-MCNC: 1.23 MG/DL (ref 0.55–1.02)
GLUCOSE SERPL-MCNC: 96 MG/DL (ref 65–100)
PHOSPHATE SERPL-MCNC: 3.2 MG/DL (ref 2.6–4.7)
POTASSIUM SERPL-SCNC: 3.4 MMOL/L (ref 3.5–5.1)
SODIUM SERPL-SCNC: 143 MMOL/L (ref 136–145)

## 2024-01-29 PROCEDURE — 2060000000 HC ICU INTERMEDIATE R&B

## 2024-01-29 PROCEDURE — 2580000003 HC RX 258: Performed by: INTERNAL MEDICINE

## 2024-01-29 PROCEDURE — 2580000003 HC RX 258: Performed by: FAMILY MEDICINE

## 2024-01-29 PROCEDURE — 70551 MRI BRAIN STEM W/O DYE: CPT

## 2024-01-29 PROCEDURE — 80069 RENAL FUNCTION PANEL: CPT

## 2024-01-29 PROCEDURE — 6370000000 HC RX 637 (ALT 250 FOR IP)

## 2024-01-29 PROCEDURE — 36415 COLL VENOUS BLD VENIPUNCTURE: CPT

## 2024-01-29 PROCEDURE — 6370000000 HC RX 637 (ALT 250 FOR IP): Performed by: INTERNAL MEDICINE

## 2024-01-29 PROCEDURE — 6360000002 HC RX W HCPCS: Performed by: FAMILY MEDICINE

## 2024-01-29 RX ORDER — QUETIAPINE FUMARATE 25 MG/1
25 TABLET, FILM COATED ORAL NIGHTLY PRN
Status: DISCONTINUED | OUTPATIENT
Start: 2024-01-29 | End: 2024-01-29

## 2024-01-29 RX ORDER — POTASSIUM CHLORIDE 750 MG/1
40 TABLET, FILM COATED, EXTENDED RELEASE ORAL 2 TIMES DAILY
Status: DISCONTINUED | OUTPATIENT
Start: 2024-01-29 | End: 2024-01-30 | Stop reason: HOSPADM

## 2024-01-29 RX ORDER — QUETIAPINE FUMARATE 25 MG/1
25 TABLET, FILM COATED ORAL NIGHTLY
Status: DISCONTINUED | OUTPATIENT
Start: 2024-01-29 | End: 2024-01-30 | Stop reason: HOSPADM

## 2024-01-29 RX ADMIN — ROSUVASTATIN 10 MG: 10 TABLET, FILM COATED ORAL at 09:24

## 2024-01-29 RX ADMIN — ASPIRIN 81 MG: 81 TABLET, CHEWABLE ORAL at 09:24

## 2024-01-29 RX ADMIN — SODIUM CHLORIDE, PRESERVATIVE FREE 10 ML: 5 INJECTION INTRAVENOUS at 20:25

## 2024-01-29 RX ADMIN — POTASSIUM CHLORIDE 40 MEQ: 750 TABLET, FILM COATED, EXTENDED RELEASE ORAL at 12:17

## 2024-01-29 RX ADMIN — POTASSIUM CHLORIDE 40 MEQ: 750 TABLET, FILM COATED, EXTENDED RELEASE ORAL at 20:23

## 2024-01-29 RX ADMIN — SODIUM CHLORIDE, PRESERVATIVE FREE 10 ML: 5 INJECTION INTRAVENOUS at 09:28

## 2024-01-29 RX ADMIN — WATER 1000 MG: 1 INJECTION INTRAMUSCULAR; INTRAVENOUS; SUBCUTANEOUS at 12:17

## 2024-01-29 RX ADMIN — QUETIAPINE FUMARATE 25 MG: 25 TABLET ORAL at 21:23

## 2024-01-29 RX ADMIN — CETIRIZINE HYDROCHLORIDE 10 MG: 10 TABLET, FILM COATED ORAL at 09:24

## 2024-01-29 NOTE — PROGRESS NOTES
Hospitalist Progress Note  Alberto Wheeler MD  Answering service: [phone] OR [phone] from in house phone        Date of Service:  2024  NAME:  Brenna Wheeler  :  1950  MRN:  [id number]      Admission Summary:   This is a 73-year-old woman with past medical history significant for type 2 diabetes, hypertension, and dyslipidemia; presented at the emergency room with change in mental status. The patient's daughter reported that this started a couple of days ago and progressively getting worse. The patient is also having hallucinations and not sleeping, poor appetite, and decreased urinary output. EMS was called and the patient was brought to the emergency room for further evaluation. When the patient arrived at the emergency room, the patient is agitated and restless and will not allow the lab work. The patient received Haldol before evaluation can be performed in the emergency room. The patient had a similar presentation and was admitted to the hospital from 2020 to 2020. Evaluation for stroke was negative. The patient was subsequently referred to outpatient followup by Psychiatry for further evaluation of possible psychosis. When the patient arrived at the emergency room, CT scan of the head was obtained. This was negative for acute pathology. The patient was subsequently referred to the hospitalist service for admission.        Interval history / Subjective:   Patient seen and examined; she remains depressed and refusing nearly all labs and treatments-  Called her daughter and placed psych consult  I think Daughter wants patient in long term care- not safe to live alone anymore     Assessment & Plan:      1.  Acute delirium-likely multifactorial.  Suspected UTI on baseline dementia- started IV abx  Unable to get urinalysis sent due to patient's noncooperative status  Brain MRI also unable to  IntraVENous 2 times per day    sodium chloride flush 0.9 % injection 5-40 mL  5-40 mL IntraVENous PRN    0.9 % sodium chloride infusion   IntraVENous PRN    ondansetron (ZOFRAN-ODT) disintegrating tablet 4 mg  4 mg Oral Q8H PRN    Or    ondansetron (ZOFRAN) injection 4 mg  4 mg IntraVENous Q6H PRN    polyethylene glycol (GLYCOLAX) packet 17 g  17 g Oral Daily PRN    acetaminophen (TYLENOL) tablet 650 mg  650 mg Oral Q6H PRN    Or    acetaminophen (TYLENOL) suppository 650 mg  650 mg Rectal Q6H PRN    nicotine (NICODERM CQ) 21 MG/24HR 1 patch  1 patch TransDERmal Daily     ______________________________________________________________________  EXPECTED LENGTH OF STAY: Unable to retrieve estimated LOS  ACTUAL LENGTH OF STAY:          2                 Alberto Wheeler MD

## 2024-01-29 NOTE — BH NOTE
PSYCHIATRY CONSULT NOTE    REASON FOR CONSULT: Psychosis       HISTORY OF PRESENTING COMPLAINT:  Brenna Wheeler is a 73 y.o. Black /  female who is currently admitted to the medical floor at [facility]. The patient was brought to the hospital by her daughter for a change in mental status. The daughter reports this started a couple days ago and has progressively gotten worse. The patient reportedly has had hallucinations and very poor sleep. Her behavior has included agitation and restlessness prior to admission. She is being treated for a UTI and Nephrology is following. On evaluation, Ms. Wheeler was seen in her room today. She is watching tv. She reports her reason for admission is a \"kidney infection\". She knows the location, the month, and she speaks about being excited for the super bowl. She reports feeling better today than she has any other day recently. Her mood is \"very good\". Yesterday wasn't a good day and she reports not feeling well and having a bad day. She currently denies SI, HI, AVH, or paranoid delusions. Brenna reports the nursing staff has been nice to her. She reports her daughter has been visiting her. She reports having poor sleep. Brenna also reports she was treated for hallucinations with medications a few years ago but doesn't remember why she stopped taking them. She is calm, cooperative, pleasant. She is hard of hearing. Her thoughts are linear, mostly organized. Her affect is congruent with her mood.     PAST PSYCHIATRIC HISTORY and SUBSTANCE ABUSE HISTORY:  Patient has been treated for psychosis during a previous admission 11/20 and was discharged on Zoloft and Risperidone, which she stopped taking.       PAST MEDICAL HISTORY:    Please see H&P for details.     Past Medical History:   Diagnosis Date    Chronic osteoarthritis 7/17/2020    Diabetes (HCC)     Elevated glucose 7/17/2020    Essential (primary) hypertension 7/17/2020    Menopause     Mixed

## 2024-01-29 NOTE — PROGRESS NOTES
[facility]      Nephrology Progress Note  Brenna Wheeler  Date of Admission : 1/26/2024    CC:  Follow up for NISHA on CKD       Assessment and Plan     NISHA on CKD:  - from IVVD + ARB, thiazide and metformin  - no obstruction on CT scan  - creatinine at baseline- 1.1 mg/dl  - daily labs and I/Os    Hypokalemia  - K stable will d/c IVF if tolerating oral well     CKD 3a:  - baseline Cr 1.2     AMS:  - improved  - neg CT, MRI :Minimal chronic microvascular ischemic disease with no acute infarction     UTI  - on abx per primary team    Hypovolemia: resolved   HTN  - continue to hold ARB  - BP controlled with  current regimen    DM2  Mild thrombocytopenia  Obesity  HLD  Tobacco use       Interval History:  Patient seen and examined this AM.Resting in bed in NAD. No UOP documented. Creatinine continues to improve. Reviewed Psyche note.    Current Medications: all current  Medications have been eviewed in EPIC  Review of Systems: Pertinent items are noted in HPI.    Objective:  Vitals:    Vitals:    01/29/24 0700 01/29/24 0750 01/29/24 0924 01/29/24 1000   BP:   (!) 117/54    Pulse: 56 56 65 65   Resp: 17      Temp:       TempSrc:       SpO2:         Intake and Output:  No intake/output data recorded.  No intake/output data recorded.    Physical Examination:  General: NAD  Neck:  Supple, no mass  Resp:  Lungs CTA B/L, no wheezing , normal respiratory effort  CV:  Regular Rate/ Rhythm,  no murmur or rub, LE edema  GI:  Soft, NT, + Bowel sounds  Neurologic:  Non focal  Skin:  No Rash  :  voiding    Recent Labs     01/26/24 2250 01/27/24  0648 01/29/24  0831    145 143   K 3.6 3.2* 3.4*   * 114* 115*   CO2 21 22 27   BUN 51* 45* 27*   MG  --  2.5*  --    PHOS  --  3.6 3.2   ALT 24 20  --      Recent Labs     01/26/24 2250 01/27/24  0648   WBC 7.5 4.9   HGB 12.3 11.1*   HCT 35.5 33.2*    135*     No results found for: \"SDES\"  No components found for: \"CULT\"  Recent Results (from  the past 24 hour(s))   Renal Function Panel    Collection Time: 01/29/24  8:31 AM   Result Value Ref Range    Sodium 143 136 - 145 mmol/L    Potassium 3.4 (L) 3.5 - 5.1 mmol/L    Chloride 115 (H) 97 - 108 mmol/L    CO2 27 21 - 32 mmol/L    Anion Gap 1 (L) 5 - 15 mmol/L    Glucose 96 65 - 100 mg/dL    BUN 27 (H) 6 - 20 MG/DL    Creatinine 1.23 (H) 0.55 - 1.02 MG/DL    Bun/Cre Ratio 22 (H) 12 - 20      Est, Glom Filt Rate 46 (L) >60 ml/min/1.73m2    Calcium 8.5 8.5 - 10.1 MG/DL    Phosphorus 3.2 2.6 - 4.7 MG/DL    Albumin 3.0 (L) 3.5 - 5.0 g/dL         Review of Medical Records: I have reviewed all pertinent labs, chart notes, microbiology data, radiology imaging this patient.  Medications list personally reviewed.  No change in PMH ,family and social history from Consult note.      MARK Higgins - NP  [facility] [address], [place] [address]  Phone: [phone]   [phone]

## 2024-01-29 NOTE — CARE COORDINATION
Care Management Initial Assessment       RUR: 14%  Readmission? No  1st IM letter given? Yes - During this assessment, 1/29  1st  letter given: No    CM met w patient at bedside to introduce self/role as CM, verify the Facesheet and review an important message from Medicare.  Patient was friendly, alert and oriented to all spheres w logical thought process.  Patient reports she is ind w all ADLs and denies DME.  She reports upon d/c she plans to purchase a shower chair and grab bars through the OTC catalog w her Medicare Advantage plan.  She also reports her son will be moving in w her.  She denies hx of HH and SNF and denies concerns w d/c home once medically ready.  All questions have been answered and CM will continue to follow for YOKASTA needs.  Anticipated d/c plan is return home and she reports family can provide transport home.      01/29/24 0948   Service Assessment   Patient Orientation Alert and Oriented;Person;Place;Situation   Cognition Alert   History Provided By Patient   Primary Caregiver Self   Support Systems Children   PCP Verified by CM Yes  (Marlon Abbott)   Last Visit to PCP Within last 3 months   Prior Functional Level Independent in ADLs/IADLs   Can patient return to prior living arrangement Yes   Ability to make needs known: Good   Would you like for me to discuss the discharge plan with any other family members/significant others, and if so, who? Yes  (Daughter, Stephanie Sloan ([phone]))   Financial Resources Medicare   Social/Functional History   Lives With Alone   Type of Home House   Home Layout One level   Home Access Stairs to enter with rails   Entrance Stairs - Number of Steps 5   Entrance Stairs - Rails Left   Bathroom Shower/Tub Tub/Shower unit   Bathroom Equipment None   Home Equipment None   Occupation Retired   Services At/After Discharge   Mode of Transport at Discharge Other (see comment)  (Family)   Condition of Participation: Discharge Planning   The Plan for

## 2024-01-29 NOTE — PLAN OF CARE
Problem: Safety - Adult  Goal: Free from fall injury  Flowsheets (Taken 1/29/2024 0157)  Free From Fall Injury:   Instruct family/caregiver on patient safety   Based on caregiver fall risk screen, instruct family/caregiver to ask for assistance with transferring infant if caregiver noted to have fall risk factors

## 2024-01-29 NOTE — PROGRESS NOTES
2023H- Patient refused for Heparin dose.    2246H- Bed exit alarmed. Patient stood up, unplugged herself off the monitor and removed her left IV and went to the bathroom. Walked the patient inside the bathroom to ensure safety.     2249H- Checked patient if doing ok but she refused to go back to the bed yet and wants to be alone in the bathroom and told she wants to stay there for couple of minutes more. Informed NP about it and seen. Patient still refused to be go back to the bed. Supervisor made aware.    2345H- Called the patient's daughter. Patient's daughter and son tried to convinced the patient to go back to the bed but patient is still refusing and is mad about it. Patient becoming verbally aggressive towards everyone.    2352H- Walked patient back to bed safely. Refused to be touched but agreed to be hooked back to the monitor and check vital signs except temperature. Patient does not want to be hooked back to her IV fluid. NP made aware. Bed exit alarm activated. Updated patient's daughter as per request.    0353H- Patient refused to check vital signs at this time. Will try to check again later. HR: 68bpm in the monitor. Breathing spontaneously. Patient refused for any blood works as she told she does not want to be stuck and she does not need anything.    0703H- Attempted to check patient's vital signs but patient refused and got angry and does not want to be touched. Patient immediately went back to sleep. HR: 58bpm. Breathing spontaneously.

## 2024-01-30 VITALS
DIASTOLIC BLOOD PRESSURE: 86 MMHG | RESPIRATION RATE: 22 BRPM | SYSTOLIC BLOOD PRESSURE: 169 MMHG | OXYGEN SATURATION: 98 % | HEART RATE: 64 BPM | TEMPERATURE: 98.3 F

## 2024-01-30 LAB
ANION GAP SERPL CALC-SCNC: 5 MMOL/L (ref 5–15)
BUN SERPL-MCNC: 24 MG/DL (ref 6–20)
BUN/CREAT SERPL: 21 (ref 12–20)
CALCIUM SERPL-MCNC: 8.8 MG/DL (ref 8.5–10.1)
CHLORIDE SERPL-SCNC: 117 MMOL/L (ref 97–108)
CO2 SERPL-SCNC: 22 MMOL/L (ref 21–32)
CREAT SERPL-MCNC: 1.16 MG/DL (ref 0.55–1.02)
GLUCOSE SERPL-MCNC: 90 MG/DL (ref 65–100)
POTASSIUM SERPL-SCNC: 4.1 MMOL/L (ref 3.5–5.1)
SODIUM SERPL-SCNC: 144 MMOL/L (ref 136–145)

## 2024-01-30 PROCEDURE — 6360000002 HC RX W HCPCS: Performed by: FAMILY MEDICINE

## 2024-01-30 PROCEDURE — 2580000003 HC RX 258: Performed by: INTERNAL MEDICINE

## 2024-01-30 PROCEDURE — 36415 COLL VENOUS BLD VENIPUNCTURE: CPT

## 2024-01-30 PROCEDURE — 80048 BASIC METABOLIC PNL TOTAL CA: CPT

## 2024-01-30 PROCEDURE — 2580000003 HC RX 258: Performed by: FAMILY MEDICINE

## 2024-01-30 PROCEDURE — 6370000000 HC RX 637 (ALT 250 FOR IP): Performed by: INTERNAL MEDICINE

## 2024-01-30 RX ORDER — AMLODIPINE BESYLATE 10 MG/1
10 TABLET ORAL DAILY
COMMUNITY
Start: 2023-11-25

## 2024-01-30 RX ORDER — QUETIAPINE FUMARATE 25 MG/1
12.5-25 TABLET, FILM COATED ORAL NIGHTLY
Qty: 30 TABLET | Refills: 4 | Status: SHIPPED | OUTPATIENT
Start: 2024-01-30

## 2024-01-30 RX ORDER — CEPHALEXIN 500 MG/1
500 CAPSULE ORAL 2 TIMES DAILY
Qty: 10 CAPSULE | Refills: 0 | Status: SHIPPED | OUTPATIENT
Start: 2024-01-30 | End: 2024-02-04

## 2024-01-30 RX ADMIN — METOPROLOL SUCCINATE 50 MG: 50 TABLET, EXTENDED RELEASE ORAL at 09:08

## 2024-01-30 RX ADMIN — CETIRIZINE HYDROCHLORIDE 10 MG: 10 TABLET, FILM COATED ORAL at 09:08

## 2024-01-30 RX ADMIN — POTASSIUM CHLORIDE 40 MEQ: 750 TABLET, FILM COATED, EXTENDED RELEASE ORAL at 09:08

## 2024-01-30 RX ADMIN — WATER 1000 MG: 1 INJECTION INTRAMUSCULAR; INTRAVENOUS; SUBCUTANEOUS at 12:20

## 2024-01-30 RX ADMIN — ROSUVASTATIN 10 MG: 10 TABLET, FILM COATED ORAL at 09:08

## 2024-01-30 RX ADMIN — SODIUM CHLORIDE, PRESERVATIVE FREE 10 ML: 5 INJECTION INTRAVENOUS at 09:08

## 2024-01-30 RX ADMIN — ASPIRIN 81 MG: 81 TABLET, CHEWABLE ORAL at 09:08

## 2024-01-30 NOTE — DISCHARGE INSTRUCTIONS
You were admitted with confusion, dehydration, and suspected urinary tract infection  Your bladder infection was treated with IV antibiotics- continue oral antibiotics on discharge  Your dehydration/ abnormal kidney function was corrected with IVFluids       and with discontinuation of your losartan; maxide medications  You have borderline diabetes- recommend stopping metformin and continue diabetic -low carb diet  You were seen by psychiatry and started on a new medication called seroquel (quetiapine) at bedtime  If you find that a whole tab leaves your drowsy in the AM, then take 1/2 tab at bedtime instead  Activity as tolerated; no driving  Recommend setting up an appointment for neuropsychological testing with a neurologist or behavioral pychologist

## 2024-01-30 NOTE — CARE COORDINATION
YOKASTA    The discharge order is in and CM met w patient at bedside to discuss the d/c plan and review an important message from Medicare.  Patient reports she is ready for d/c and will be going to her daughter's house off [address] in [place].  She was agreeable to home health physical therapy and requested I call her daughter to review.  CM spoke w daughter, Stephanie ([phone]) and she declines home health at this time and reports patient needs personal care.  CM provided education on this level of care and agreed to provide names of 3 agencies on the AVS so they can follow up.  Daughter and son in law will be here around 6:30pm to transport patient to their house at [facility]. All questions have been answered and CM wished family well.    SEPIDEH Rosado CCM  Care Management

## 2024-01-31 NOTE — DISCHARGE SUMMARY
Discharge Summary       PATIENT ID: Brenna Wheeler  MRN: [id number]   YOB: 1950    DATE OF ADMISSION: 1/26/2024  7:57 PM    DATE OF DISCHARGE: 1/31/24   PRIMARY CARE PROVIDER: Marlon Abbott Sr., MD     ATTENDING PHYSICIAN: FAITH  DISCHARGING PROVIDER: Alberto Wheeler MD      CONSULTATIONS: IP CONSULT TO HOSPITALIST  IP CONSULT TO NEPHROLOGY  IP CONSULT TO PSYCHIATRY    PROCEDURES/SURGERIES: * No surgery found *    ADMISSION SUMMARY AND HOSPITAL COURSE:     This is a 73-year-old woman with past medical history significant for type 2 diabetes, hypertension, and dyslipidemia; presented at the emergency room with change in mental status. The patient's daughter reported that this started a couple of days ago and progressively getting worse. The patient is also having hallucinations and not sleeping, poor appetite, and decreased urinary output. EMS was called and the patient was brought to the emergency room for further evaluation. When the patient arrived at the emergency room, the patient is agitated and restless and will not allow the lab work. The patient received Haldol before evaluation can be performed in the emergency room. The patient had a similar presentation and was admitted to the hospital from 11/09/2020 to 11/13/2020. Evaluation for stroke was negative. The patient was subsequently referred to outpatient followup by Psychiatry for further evaluation of possible psychosis. When the patient arrived at the emergency room, CT scan of the head was obtained. This was negative for acute pathology. The patient was subsequently referred to the hospitalist service for admission.   DISCHARGE DIAGNOSES / PLAN:        1.  Acute delirium-likely multifactorial.  Suspected UTI on baseline dementia- started IV abx  Unable to get urinalysis sent due to patient's noncooperative status  Brain MRI also unable to be completed due to noncooperative status  Patient shows prior admission from November  capsule  QUEtiapine 25 MG tablet         DISPOSITION:  x  Home With:  x OT x PT x HH  RN       Long term SNF/Inpatient Rehab    Independent/assisted living    Hospice    Other:     PATIENT CONDITION AT DISCHARGE:     Functional status    Poor    x Deconditioned     Independent      Cognition     Lucid    x Forgetful     Dementia      Catheters/lines (plus indication)    Madrigal     PICC     PEG    x None      Code status   x  Full code     DNR      PHYSICAL EXAMINATION AT DISCHARGE:  Patient Vitals for the past 24 hrs:   BP Temp Temp src Pulse Resp   01/30/24 1800 -- -- -- 64 --   01/30/24 1353 -- -- -- 70 --   01/30/24 1230 (!) 169/86 98.3 °F (36.8 °C) Oral 57 22   01/30/24 1000 -- -- -- 62 --   01/30/24 0910 (!) 160/82 -- -- 61 17      General:          Alert, cooperative, no distress, appears stated age.     HEENT:           Atraumatic, anicteric sclerae, pink conjunctivae  Neck:               Supple, symmetrical  Lungs:             Clear to auscultation bilaterally.  No Wheezing or Rhonchi. No rales.  Chest wall:      No tenderness  No Accessory muscle use.  Heart:              Regular  rhythm,  No  murmur   No edema  Abdomen:        Soft, non-tender. Not distended.  Bowel sounds normal  Extremities:     No cyanosis.  No clubbing,    Skin:                Not pale.  Not Jaundiced  No rashes   Psych:             Not anxious or agitated.  Neurologic:      Alert, moves all extremities, answers questions appropriately and responds to commands     CBC with Differential:    Lab Results   Component Value Date/Time    WBC 4.9 01/27/2024 06:48 AM    RBC 3.38 01/27/2024 06:48 AM    HGB 11.1 01/27/2024 06:48 AM    HCT 33.2 01/27/2024 06:48 AM     01/27/2024 06:48 AM    MCV 98.2 01/27/2024 06:48 AM    MCH 32.8 01/27/2024 06:48 AM    MCHC 33.4 01/27/2024 06:48 AM    RDW 12.8 01/27/2024 06:48 AM    NRBC 0.0 01/27/2024 06:48 AM    NRBC 0.00 01/27/2024 06:48 AM    LYMPHOPCT 43 01/27/2024 06:48 AM    MONOPCT 11 01/27/2024

## 2024-02-05 NOTE — PROGRESS NOTES
Physician Progress Note      PATIENT:               OLI IRIZARRY  CSN #:                  [id number]  :                       1950  ADMIT DATE:       2024 7:57 PM  DISCH DATE:        2024 6:40 PM  RESPONDING  PROVIDER #:        Alberto Wheeler MD          QUERY TEXT:    Patient admitted with Acute delirium . Noted documentation of UTI. In order to   support the diagnosis of UTI, please include additional clinical indicators   in your documentation.  Or please document if the diagnosis of UTI has been   ruled out after further study.      The medical record reflects the following:  Risk Factors: UTI    Clinical Indicators:  PN   ?Acute delirium-likely multifactorial.  Suspected UTI on baseline dementia  Unable to get urinalysis sent due to patient's noncooperative status    CT ABDOMEN PELVIS WO CONTRAST  No acute abnormality is identified      Treatment:  sodium chloride 0.9 % bolus 1,000 mL  lactated ringers IV soln infusion Rate: 75 mL/hr  cefTRIAXone (ROCEPHIN) 1,000 mg in sterile water 10 mL IV syringe    Thank you,  Juana Rao RN CDI  CRCR  Clinical Documentation  [phone] or via [employer]  [email or web address]  Options provided:  -- UTI was ruled out  -- UTI present as evidenced by, Please document evidence.  -- Other - I will add my own diagnosis  -- Disagree - Not applicable / Not valid  -- Disagree - Clinically unable to determine / Unknown  -- Refer to Clinical Documentation Reviewer    PROVIDER RESPONSE TEXT:    UTI is present as evidenced by clinical suspicion    Query created by: Juana Rao on 2024 7:16 AM      Electronically signed by:  Alberto Wheeler MD 2024 5:34 AM

## 2024-06-08 NOTE — PROGRESS NOTES
Problem: Falls - Risk of  Goal: *Absence of Falls  Description: Document Booker Going Fall Risk and appropriate interventions in the flowsheet. Outcome: Progressing Towards Goal  Note: Fall Risk Interventions:  Mobility Interventions: Patient to call before getting OOB, Bed/chair exit alarm, Communicate number of staff needed for ambulation/transfer    Mentation Interventions: Adequate sleep, hydration, pain control, Eyeglasses and hearing aids, Door open when patient unattended, More frequent rounding    Medication Interventions: Patient to call before getting OOB, Teach patient to arise slowly    Elimination Interventions: Call light in reach, Patient to call for help with toileting needs    History of Falls Interventions: Bed/chair exit alarm         Problem: Pressure Injury - Risk of  Goal: *Prevention of pressure injury  Description: Document Ricky Scale and appropriate interventions in the flowsheet.   Outcome: Progressing Towards Goal  Note: Pressure Injury Interventions:       Moisture Interventions: Absorbent underpads, Apply protective barrier, creams and emollients, Minimize layers    Activity Interventions: Increase time out of bed    Mobility Interventions: HOB 30 degrees or less, Float heels    Nutrition Interventions: Document food/fluid/supplement intake